# Patient Record
Sex: FEMALE | Race: WHITE | NOT HISPANIC OR LATINO
[De-identification: names, ages, dates, MRNs, and addresses within clinical notes are randomized per-mention and may not be internally consistent; named-entity substitution may affect disease eponyms.]

---

## 2016-12-07 RX ORDER — MORPHINE SULFATE 50 MG/1
1 CAPSULE, EXTENDED RELEASE ORAL
Qty: 0 | Refills: 0 | DISCHARGE
Start: 2016-12-07

## 2016-12-07 RX ORDER — GABAPENTIN 400 MG/1
1 CAPSULE ORAL
Qty: 0 | Refills: 0 | COMMUNITY
Start: 2016-12-07

## 2016-12-07 RX ORDER — DIAZEPAM 5 MG
1 TABLET ORAL
Qty: 0 | Refills: 0 | DISCHARGE
Start: 2016-12-07

## 2017-01-16 ENCOUNTER — APPOINTMENT (OUTPATIENT)
Dept: PULMONOLOGY | Facility: CLINIC | Age: 72
End: 2017-01-16

## 2017-01-16 VITALS
SYSTOLIC BLOOD PRESSURE: 160 MMHG | DIASTOLIC BLOOD PRESSURE: 70 MMHG | OXYGEN SATURATION: 98 % | WEIGHT: 127 LBS | BODY MASS INDEX: 22.5 KG/M2 | HEART RATE: 86 BPM

## 2017-01-16 RX ORDER — BUDESONIDE AND FORMOTEROL FUMARATE DIHYDRATE 80; 4.5 UG/1; UG/1
80-4.5 AEROSOL RESPIRATORY (INHALATION) TWICE DAILY
Qty: 1 | Refills: 2 | Status: ACTIVE | COMMUNITY
Start: 2017-01-16 | End: 1900-01-01

## 2017-05-23 ENCOUNTER — APPOINTMENT (OUTPATIENT)
Dept: NEUROSURGERY | Facility: CLINIC | Age: 72
End: 2017-05-23

## 2017-06-01 ENCOUNTER — APPOINTMENT (OUTPATIENT)
Dept: RADIOLOGY | Facility: HOSPITAL | Age: 72
End: 2017-06-01

## 2017-06-01 ENCOUNTER — OUTPATIENT (OUTPATIENT)
Dept: OUTPATIENT SERVICES | Facility: HOSPITAL | Age: 72
LOS: 1 days | End: 2017-06-01
Payer: MEDICARE

## 2017-06-01 DIAGNOSIS — Z98.89 OTHER SPECIFIED POSTPROCEDURAL STATES: Chronic | ICD-10-CM

## 2017-06-01 DIAGNOSIS — M54.9 DORSALGIA, UNSPECIFIED: Chronic | ICD-10-CM

## 2017-06-01 DIAGNOSIS — Z98.49 CATARACT EXTRACTION STATUS, UNSPECIFIED EYE: Chronic | ICD-10-CM

## 2017-06-01 DIAGNOSIS — Z98.1 ARTHRODESIS STATUS: Chronic | ICD-10-CM

## 2017-06-01 DIAGNOSIS — M48.06 SPINAL STENOSIS, LUMBAR REGION: ICD-10-CM

## 2017-06-01 PROCEDURE — 72132 CT LUMBAR SPINE W/DYE: CPT

## 2017-06-01 PROCEDURE — 62304 MYELOGRAPHY LUMBAR INJECTION: CPT

## 2017-07-03 ENCOUNTER — APPOINTMENT (OUTPATIENT)
Dept: NEUROSURGERY | Facility: CLINIC | Age: 72
End: 2017-07-03

## 2017-07-03 VITALS
HEIGHT: 63 IN | SYSTOLIC BLOOD PRESSURE: 130 MMHG | BODY MASS INDEX: 22.86 KG/M2 | DIASTOLIC BLOOD PRESSURE: 60 MMHG | HEART RATE: 83 BPM | WEIGHT: 129 LBS

## 2017-07-05 RX ORDER — RANITIDINE 300 MG/1
300 TABLET ORAL
Qty: 60 | Refills: 0 | Status: ACTIVE | COMMUNITY
Start: 2017-05-04

## 2017-07-18 ENCOUNTER — OUTPATIENT (OUTPATIENT)
Dept: OUTPATIENT SERVICES | Facility: HOSPITAL | Age: 72
LOS: 1 days | End: 2017-07-18
Payer: MEDICARE

## 2017-07-18 VITALS
HEIGHT: 64 IN | DIASTOLIC BLOOD PRESSURE: 78 MMHG | HEART RATE: 75 BPM | OXYGEN SATURATION: 98 % | TEMPERATURE: 98 F | WEIGHT: 126.99 LBS | SYSTOLIC BLOOD PRESSURE: 130 MMHG | RESPIRATION RATE: 15 BRPM

## 2017-07-18 DIAGNOSIS — E11.9 TYPE 2 DIABETES MELLITUS WITHOUT COMPLICATIONS: ICD-10-CM

## 2017-07-18 DIAGNOSIS — Z98.49 CATARACT EXTRACTION STATUS, UNSPECIFIED EYE: Chronic | ICD-10-CM

## 2017-07-18 DIAGNOSIS — Z98.89 OTHER SPECIFIED POSTPROCEDURAL STATES: Chronic | ICD-10-CM

## 2017-07-18 DIAGNOSIS — M54.9 DORSALGIA, UNSPECIFIED: Chronic | ICD-10-CM

## 2017-07-18 DIAGNOSIS — Z00.00 ENCOUNTER FOR GENERAL ADULT MEDICAL EXAMINATION WITHOUT ABNORMAL FINDINGS: ICD-10-CM

## 2017-07-18 DIAGNOSIS — Z01.818 ENCOUNTER FOR OTHER PREPROCEDURAL EXAMINATION: ICD-10-CM

## 2017-07-18 DIAGNOSIS — I10 ESSENTIAL (PRIMARY) HYPERTENSION: ICD-10-CM

## 2017-07-18 DIAGNOSIS — E03.9 HYPOTHYROIDISM, UNSPECIFIED: ICD-10-CM

## 2017-07-18 DIAGNOSIS — Z98.1 ARTHRODESIS STATUS: Chronic | ICD-10-CM

## 2017-07-18 DIAGNOSIS — G89.4 CHRONIC PAIN SYNDROME: ICD-10-CM

## 2017-07-18 LAB
ANION GAP SERPL CALC-SCNC: 16 MMOL/L — SIGNIFICANT CHANGE UP (ref 5–17)
BUN SERPL-MCNC: 25 MG/DL — HIGH (ref 7–23)
CALCIUM SERPL-MCNC: 9.9 MG/DL — SIGNIFICANT CHANGE UP (ref 8.4–10.5)
CHLORIDE SERPL-SCNC: 89 MMOL/L — LOW (ref 96–108)
CO2 SERPL-SCNC: 22 MMOL/L — SIGNIFICANT CHANGE UP (ref 22–31)
CREAT SERPL-MCNC: 0.98 MG/DL — SIGNIFICANT CHANGE UP (ref 0.5–1.3)
GLUCOSE SERPL-MCNC: 110 MG/DL — HIGH (ref 70–99)
HCT VFR BLD CALC: 31.7 % — LOW (ref 34.5–45)
HGB BLD-MCNC: 10.8 G/DL — LOW (ref 11.5–15.5)
MCHC RBC-ENTMCNC: 29.9 PG — SIGNIFICANT CHANGE UP (ref 27–34)
MCHC RBC-ENTMCNC: 34.1 GM/DL — SIGNIFICANT CHANGE UP (ref 32–36)
MCV RBC AUTO: 87.8 FL — SIGNIFICANT CHANGE UP (ref 80–100)
PLATELET # BLD AUTO: 347 K/UL — SIGNIFICANT CHANGE UP (ref 150–400)
POTASSIUM SERPL-MCNC: 4 MMOL/L — SIGNIFICANT CHANGE UP (ref 3.5–5.3)
POTASSIUM SERPL-SCNC: 4 MMOL/L — SIGNIFICANT CHANGE UP (ref 3.5–5.3)
RBC # BLD: 3.61 M/UL — LOW (ref 3.8–5.2)
RBC # FLD: 13.8 % — SIGNIFICANT CHANGE UP (ref 10.3–14.5)
SODIUM SERPL-SCNC: 127 MMOL/L — LOW (ref 135–145)
WBC # BLD: 10.17 K/UL — SIGNIFICANT CHANGE UP (ref 3.8–10.5)
WBC # FLD AUTO: 10.17 K/UL — SIGNIFICANT CHANGE UP (ref 3.8–10.5)

## 2017-07-18 PROCEDURE — 83036 HEMOGLOBIN GLYCOSYLATED A1C: CPT

## 2017-07-18 PROCEDURE — 85027 COMPLETE CBC AUTOMATED: CPT

## 2017-07-18 PROCEDURE — G0463: CPT

## 2017-07-18 PROCEDURE — 80048 BASIC METABOLIC PNL TOTAL CA: CPT

## 2017-07-18 NOTE — H&P PST ADULT - HISTORY OF PRESENT ILLNESS
72 y/o female with pmhx of DM type 2, HTN, asthma, GERD, chronic low back pain s/p multiple spinal surgeries s/p spinal cord stimulator placed in 2000 (Medtronic). Now needs to have battery change. Pt states she has been experiencing worsening left left pain - needs to have a pillow placed under her leg for comfort. Presents for spinal cord stimulator pulse generator replacement. 72 y/o female with pmhx of DM type 2, HTN, asthma, GERD, chronic low back pain s/p multiple spinal surgeries s/p spinal cord stimulator placed in 2000 (Medtronic). Now needs to have battery change. Pt states she has been experiencing worsening left leg pain - needs to have a pillow placed under her leg for comfort. Presents for spinal cord stimulator pulse generator replacement.

## 2017-07-18 NOTE — H&P PST ADULT - PMH
Anemia    Anxiety    Asthma  Allergy induced  Chronic pain disorder  1999 pain management DR Vargas  2004 /  [phone]  GERD (gastroesophageal reflux disease)    HTN (hypertension)    Hypothyroid    Migraine    Spinal stenosis    Type 2 diabetes mellitus Anemia    Anxiety    Asthma  Allergy induced  Chronic pain disorder  1999 pain management DR Vargas  2004 /  [phone]  GERD (gastroesophageal reflux disease)    HTN (hypertension)    Hypothyroid    Migraine    Spinal stenosis    Type 2 diabetes mellitus

## 2017-07-18 NOTE — H&P PST ADULT - ACTIVITY
able to take care of her home, grocery shopping, was able to walk from main entrance of the hospital to [facility] without stopping able to take care of her home, grocery shopping, was able to walk from main entrance of the hospital to [facility] without stopping; lives independently

## 2017-07-18 NOTE — H&P PST ADULT - ALLERGY TYPES
outdoor environmental allergies/indoor environmental allergies/reactions to medicines/reactions to animals

## 2017-07-18 NOTE — H&P PST ADULT - PSH
Back pain  Insertion Neurostimulator Metronic 2000   battery change   2002 , 2003, 11/06 off during day time  H/O arthroscopy of knee    S/P appendectomy  1956  S/P cervical discectomy  Anterior  with Fusion C3-C4, C4-C5, C5-C6, C6-C7 2006  S/P D&C (status post dilation and curettage)  x 2  & Cone biopsy 1996  S/P laminectomy with spinal fusion  Thorasic Posterior Arthrodesis  decompresion 11/06  S/P lumbar laminectomy  Decompressive Laminectomy L-3 -4  L4--5 2003   Lumbar  Arthrodesis  posterior instrumentation 4/04  S/P lumbar laminectomy  1995  S/P tonsillectomy  1948  Status post cataract extraction Back pain  Insertion Neurostimulator Metronic 2000   battery change   2002 , 2003, 11/06 off during day time  H/O arthroscopy of knee    S/P appendectomy  1956  S/P cervical discectomy  Anterior  with Fusion C3-C4, C4-C5, C5-C6, C6-C7 2006  S/P D&C (status post dilation and curettage)  x 2  & Cone biopsy 1996  S/P laminectomy with spinal fusion  Thorasic Posterior Arthrodesis  decompresion 11/06 2014 - Fusion of T8-T10 correction of scoliosis  2016 - L5-S1 stabilization reexploration of fusion  S/P lumbar laminectomy  Decompressive Laminectomy L-3 -4  L4--5 2003   Lumbar  Arthrodesis  posterior instrumentation 4/04  S/P lumbar laminectomy  1995  S/P tonsillectomy  1948  Status post cataract extraction

## 2017-07-18 NOTE — H&P PST ADULT - NSANTHOSAYNRD_GEN_A_CORE
No. ROMEO screening performed.  STOP BANG Legend: 0-2 = LOW Risk; 3-4 = INTERMEDIATE Risk; 5-8 = HIGH Risk/14.5 in

## 2017-07-19 LAB — HBA1C BLD-MCNC: 5.6 % — SIGNIFICANT CHANGE UP (ref 4–5.6)

## 2017-07-20 ENCOUNTER — APPOINTMENT (OUTPATIENT)
Dept: NEUROSURGERY | Facility: HOSPITAL | Age: 72
End: 2017-07-20
Payer: MEDICARE

## 2017-07-20 PROCEDURE — ZZZZZ: CPT

## 2017-08-14 ENCOUNTER — RESULT REVIEW (OUTPATIENT)
Age: 72
End: 2017-08-14

## 2017-08-18 ENCOUNTER — APPOINTMENT (OUTPATIENT)
Dept: PULMONOLOGY | Facility: CLINIC | Age: 72
End: 2017-08-18
Payer: MEDICARE

## 2017-08-18 VITALS
BODY MASS INDEX: 22.86 KG/M2 | SYSTOLIC BLOOD PRESSURE: 125 MMHG | OXYGEN SATURATION: 98 % | RESPIRATION RATE: 17 BRPM | WEIGHT: 129 LBS | HEART RATE: 96 BPM | DIASTOLIC BLOOD PRESSURE: 70 MMHG | HEIGHT: 63 IN

## 2017-08-18 DIAGNOSIS — R68.2 DRY MOUTH, UNSPECIFIED: ICD-10-CM

## 2017-08-18 PROCEDURE — 94620 PULMONARY STRESS TESTING SIMPLE: CPT

## 2017-08-18 PROCEDURE — 94727 GAS DIL/WSHOT DETER LNG VOL: CPT

## 2017-08-18 PROCEDURE — 94010 BREATHING CAPACITY TEST: CPT | Mod: 59

## 2017-08-18 PROCEDURE — 99214 OFFICE O/P EST MOD 30 MIN: CPT | Mod: 25

## 2017-08-18 PROCEDURE — 94729 DIFFUSING CAPACITY: CPT

## 2017-08-19 ENCOUNTER — TRANSCRIPTION ENCOUNTER (OUTPATIENT)
Age: 72
End: 2017-08-19

## 2017-08-23 ENCOUNTER — APPOINTMENT (OUTPATIENT)
Dept: THORACIC SURGERY | Facility: CLINIC | Age: 72
End: 2017-08-23
Payer: MEDICARE

## 2017-08-23 VITALS
SYSTOLIC BLOOD PRESSURE: 156 MMHG | DIASTOLIC BLOOD PRESSURE: 68 MMHG | WEIGHT: 124 LBS | OXYGEN SATURATION: 98 % | HEART RATE: 95 BPM | BODY MASS INDEX: 22.82 KG/M2 | HEIGHT: 62 IN

## 2017-08-23 DIAGNOSIS — G06.0 INTRACRANIAL ABSCESS AND GRANULOMA: ICD-10-CM

## 2017-08-23 DIAGNOSIS — Z78.9 OTHER SPECIFIED HEALTH STATUS: ICD-10-CM

## 2017-08-23 DIAGNOSIS — Z86.39 PERSONAL HISTORY OF OTHER ENDOCRINE, NUTRITIONAL AND METABOLIC DISEASE: ICD-10-CM

## 2017-08-23 DIAGNOSIS — Z87.01 PERSONAL HISTORY OF PNEUMONIA (RECURRENT): ICD-10-CM

## 2017-08-23 PROCEDURE — 99213 OFFICE O/P EST LOW 20 MIN: CPT

## 2017-08-23 RX ORDER — GABAPENTIN 100 MG/1
100 CAPSULE ORAL
Qty: 180 | Refills: 0 | Status: DISCONTINUED | COMMUNITY
Start: 2016-10-05 | End: 2017-08-23

## 2017-08-23 RX ORDER — BUTALBITAL, ACETAMINOPHEN AND CAFFEINE 300; 50; 40 MG/1; MG/1; MG/1
50-300-40 CAPSULE ORAL
Qty: 30 | Refills: 0 | Status: DISCONTINUED | COMMUNITY
Start: 2017-04-18 | End: 2017-08-23

## 2017-08-23 RX ORDER — GABAPENTIN 600 MG/1
600 TABLET, COATED ORAL
Qty: 90 | Refills: 0 | Status: DISCONTINUED | COMMUNITY
Start: 2017-05-16 | End: 2017-08-23

## 2017-08-23 RX ORDER — DIAZEPAM 5 MG/1
5 TABLET ORAL
Qty: 90 | Refills: 0 | Status: DISCONTINUED | COMMUNITY
Start: 2016-12-15 | End: 2017-08-23

## 2017-08-23 RX ORDER — CYCLOBENZAPRINE HYDROCHLORIDE 5 MG/1
5 TABLET, FILM COATED ORAL
Qty: 30 | Refills: 0 | Status: DISCONTINUED | COMMUNITY
Start: 2017-08-01 | End: 2017-08-23

## 2017-08-23 RX ORDER — HYDROCHLOROTHIAZIDE 12.5 MG/1
12.5 TABLET ORAL
Qty: 90 | Refills: 0 | Status: DISCONTINUED | COMMUNITY
Start: 2017-02-01 | End: 2017-08-23

## 2017-08-23 RX ORDER — METFORMIN ER 500 MG 500 MG/1
500 TABLET ORAL
Qty: 30 | Refills: 0 | Status: DISCONTINUED | COMMUNITY
Start: 2017-04-12 | End: 2017-08-23

## 2017-08-23 RX ORDER — FOLIC ACID 1 MG/1
1 TABLET ORAL
Qty: 30 | Refills: 0 | Status: DISCONTINUED | COMMUNITY
Start: 2016-12-15 | End: 2017-08-23

## 2017-08-23 RX ORDER — METHADONE HYDROCHLORIDE 10 MG/1
10 TABLET ORAL
Qty: 90 | Refills: 0 | Status: DISCONTINUED | COMMUNITY
Start: 2017-04-18 | End: 2017-08-23

## 2017-08-23 RX ORDER — LOSARTAN POTASSIUM 25 MG/1
25 TABLET, FILM COATED ORAL
Qty: 30 | Refills: 0 | Status: DISCONTINUED | COMMUNITY
Start: 2017-01-04 | End: 2017-08-23

## 2017-08-23 RX ORDER — LOSARTAN POTASSIUM 50 MG/1
50 TABLET, FILM COATED ORAL
Qty: 6 | Refills: 0 | Status: DISCONTINUED | COMMUNITY
Start: 2017-01-30 | End: 2017-08-23

## 2017-08-23 RX ORDER — GABAPENTIN 400 MG/1
400 CAPSULE ORAL
Qty: 90 | Refills: 0 | Status: DISCONTINUED | COMMUNITY
Start: 2017-04-18 | End: 2017-08-23

## 2017-08-23 RX ORDER — AMOXICILLIN 500 MG/1
500 CAPSULE ORAL
Qty: 40 | Refills: 0 | Status: DISCONTINUED | COMMUNITY
Start: 2017-06-23 | End: 2017-08-23

## 2017-09-27 ENCOUNTER — OUTPATIENT (OUTPATIENT)
Dept: OUTPATIENT SERVICES | Facility: HOSPITAL | Age: 72
LOS: 1 days | End: 2017-09-27
Payer: MEDICARE

## 2017-09-27 VITALS
OXYGEN SATURATION: 98 % | WEIGHT: 268.96 LBS | SYSTOLIC BLOOD PRESSURE: 140 MMHG | HEIGHT: 62 IN | RESPIRATION RATE: 16 BRPM | DIASTOLIC BLOOD PRESSURE: 70 MMHG | TEMPERATURE: 98 F | HEART RATE: 78 BPM

## 2017-09-27 DIAGNOSIS — M48.00 SPINAL STENOSIS, SITE UNSPECIFIED: ICD-10-CM

## 2017-09-27 DIAGNOSIS — I10 ESSENTIAL (PRIMARY) HYPERTENSION: ICD-10-CM

## 2017-09-27 DIAGNOSIS — Z98.89 OTHER SPECIFIED POSTPROCEDURAL STATES: Chronic | ICD-10-CM

## 2017-09-27 DIAGNOSIS — T85.192D OTHER MECHANICAL COMPLICATION OF IMPLANTED ELECTRONIC NEUROSTIMULATOR OF SPINAL CORD ELECTRODE (LEAD), SUBSEQUENT ENCOUNTER: ICD-10-CM

## 2017-09-27 DIAGNOSIS — Z98.49 CATARACT EXTRACTION STATUS, UNSPECIFIED EYE: Chronic | ICD-10-CM

## 2017-09-27 DIAGNOSIS — E11.9 TYPE 2 DIABETES MELLITUS WITHOUT COMPLICATIONS: ICD-10-CM

## 2017-09-27 DIAGNOSIS — Z98.1 ARTHRODESIS STATUS: Chronic | ICD-10-CM

## 2017-09-27 DIAGNOSIS — M54.9 DORSALGIA, UNSPECIFIED: Chronic | ICD-10-CM

## 2017-09-27 DIAGNOSIS — E03.9 HYPOTHYROIDISM, UNSPECIFIED: ICD-10-CM

## 2017-09-27 LAB
BUN SERPL-MCNC: 20 MG/DL — SIGNIFICANT CHANGE UP (ref 7–23)
CALCIUM SERPL-MCNC: 9.2 MG/DL — SIGNIFICANT CHANGE UP (ref 8.4–10.5)
CHLORIDE SERPL-SCNC: 100 MMOL/L — SIGNIFICANT CHANGE UP (ref 98–107)
CO2 SERPL-SCNC: 24 MMOL/L — SIGNIFICANT CHANGE UP (ref 22–31)
CREAT SERPL-MCNC: 0.91 MG/DL — SIGNIFICANT CHANGE UP (ref 0.5–1.3)
GLUCOSE SERPL-MCNC: 102 MG/DL — HIGH (ref 70–99)
HBA1C BLD-MCNC: 5.7 % — HIGH (ref 4–5.6)
HCT VFR BLD CALC: 35.5 % — SIGNIFICANT CHANGE UP (ref 34.5–45)
HGB BLD-MCNC: 11.6 G/DL — SIGNIFICANT CHANGE UP (ref 11.5–15.5)
MCHC RBC-ENTMCNC: 30.8 PG — SIGNIFICANT CHANGE UP (ref 27–34)
MCHC RBC-ENTMCNC: 32.7 % — SIGNIFICANT CHANGE UP (ref 32–36)
MCV RBC AUTO: 94.2 FL — SIGNIFICANT CHANGE UP (ref 80–100)
NRBC # FLD: 0 — SIGNIFICANT CHANGE UP
PLATELET # BLD AUTO: 260 K/UL — SIGNIFICANT CHANGE UP (ref 150–400)
PMV BLD: 10.2 FL — SIGNIFICANT CHANGE UP (ref 7–13)
POTASSIUM SERPL-MCNC: 4.3 MMOL/L — SIGNIFICANT CHANGE UP (ref 3.5–5.3)
POTASSIUM SERPL-SCNC: 4.3 MMOL/L — SIGNIFICANT CHANGE UP (ref 3.5–5.3)
RBC # BLD: 3.77 M/UL — LOW (ref 3.8–5.2)
RBC # FLD: 13.8 % — SIGNIFICANT CHANGE UP (ref 10.3–14.5)
SODIUM SERPL-SCNC: 135 MMOL/L — SIGNIFICANT CHANGE UP (ref 135–145)
WBC # BLD: 8.57 K/UL — SIGNIFICANT CHANGE UP (ref 3.8–10.5)
WBC # FLD AUTO: 8.57 K/UL — SIGNIFICANT CHANGE UP (ref 3.8–10.5)

## 2017-09-27 PROCEDURE — 93010 ELECTROCARDIOGRAM REPORT: CPT

## 2017-09-27 RX ORDER — AZELASTINE 137 UG/1
1 SPRAY, METERED NASAL
Qty: 0 | Refills: 0 | COMMUNITY

## 2017-09-27 NOTE — H&P PST ADULT - MUSCULOSKELETAL
details… detailed exam cervical spine, lumbar spine, left leg/decreased ROM due to pain/diminished strength

## 2017-09-27 NOTE — H&P PST ADULT - HISTORY OF PRESENT ILLNESS
71 y/o female with chronic back pain and scoliosis s/p multiple laminectomies with worsening low back pain and left leg pain over the last several months. Minimal relief from narcotic analgesics. Presents for removal of spinal hardware, exploration of spinal fusion, left L5-S1 foraminotomy, L2-S1 stabilization and fusion. 70 y/o female with chronic back pain and scoliosis s/p multiple laminectomies scheduled for Spinal Cord stimulator Pulse generator replacement with Dr Pak 10/4/17. Pt s/p L5-S1 lumbar fusion 11/16 - but continues to have chronic pain and under pain management. Pt states pain is 3/10 with meds. Generator placed 2000 - last change 8 yrs ago. Pt hx of DM, HTN HLD

## 2017-09-27 NOTE — H&P PST ADULT - PMH
Anemia    Anxiety    Asthma  Allergy induced  Chronic pain disorder  1999 pain management DR Vargas  2004 /  [phone]  GERD (gastroesophageal reflux disease)    HTN (hypertension)    Hypothyroid    Migraine    Spinal stenosis    Type 2 diabetes mellitus

## 2017-09-27 NOTE — H&P PST ADULT - PROBLEM SELECTOR PLAN 1
Pt given pre-op instructions and Famotidine and Chlorhexidine and verbalized understanding.   OR Booking notified of Medtronic nerve stimulator, allergy to Dilaudid and restriction to ROM of cervical spine via fax.   MC requested and forms given.

## 2017-09-27 NOTE — H&P PST ADULT - MALLAMPATI CLASS
Class I (easy) - visualization of the soft palate, fauces, uvula, and both anterior and posterior pillars with phonation/Class I (easy) - visualization of the soft palate, fauces, uvula, and both anterior and posterior pillars

## 2017-09-27 NOTE — H&P PST ADULT - NEGATIVE ENMT SYMPTOMS
no throat pain/no dysphagia/no ear pain/no vertigo/no sinus symptoms/no tinnitus/no hearing difficulty

## 2017-09-27 NOTE — H&P PST ADULT - NEGATIVE NEUROLOGICAL SYMPTOMS
no difficulty walking/no transient paralysis/no confusion/no weakness/no generalized seizures/no focal seizures/no tremors/no syncope

## 2017-09-27 NOTE — H&P PST ADULT - PSH
Back pain  Insertion Neurostimulator Metronic 2000   battery change   2002 , 2003, 11/06 off during day time  H/O arthroscopy of knee    S/P appendectomy  1956  S/P cervical discectomy  Anterior  with Fusion C3-C4, C4-C5, C5-C6, C6-C7 2006  S/P D&C (status post dilation and curettage)  x 2  & Cone biopsy 1996  S/P laminectomy with spinal fusion  Thorasic Posterior Arthrodesis  decompresion 11/06  S/P lumbar laminectomy  Decompressive Laminectomy L-3 -4  L4--5 2003   Lumbar  Arthrodesis  posterior instrumentation 4/04  S/P lumbar laminectomy  1995  S/P tonsillectomy  1948  Status post cataract extraction

## 2017-09-27 NOTE — H&P PST ADULT - NEUROLOGICAL DETAILS
sensation intact/alert and oriented x 3/responds to pain/normal strength/responds to verbal commands

## 2017-10-10 NOTE — ASU PATIENT PROFILE, ADULT - PMH
Anemia    Anxiety    Asthma  Allergy induced  Chronic pain disorder  1999 pain management DR Vargas  2004 /  [phone]  GERD (gastroesophageal reflux disease)    HTN (hypertension)    Hypothyroid    Migraine    Spinal stenosis    Type 2 diabetes mellitus

## 2017-10-11 ENCOUNTER — OUTPATIENT (OUTPATIENT)
Dept: INPATIENT UNIT | Facility: HOSPITAL | Age: 72
LOS: 1 days | Discharge: HOME | End: 2017-10-11

## 2017-10-11 VITALS
DIASTOLIC BLOOD PRESSURE: 60 MMHG | HEIGHT: 62 IN | WEIGHT: 268.96 LBS | OXYGEN SATURATION: 100 % | SYSTOLIC BLOOD PRESSURE: 152 MMHG | HEART RATE: 84 BPM | RESPIRATION RATE: 16 BRPM | TEMPERATURE: 98 F

## 2017-10-11 VITALS
SYSTOLIC BLOOD PRESSURE: 149 MMHG | RESPIRATION RATE: 16 BRPM | OXYGEN SATURATION: 99 % | TEMPERATURE: 97 F | DIASTOLIC BLOOD PRESSURE: 64 MMHG | HEART RATE: 76 BPM

## 2017-10-11 DIAGNOSIS — Z98.89 OTHER SPECIFIED POSTPROCEDURAL STATES: Chronic | ICD-10-CM

## 2017-10-11 DIAGNOSIS — Z98.49 CATARACT EXTRACTION STATUS, UNSPECIFIED EYE: Chronic | ICD-10-CM

## 2017-10-11 DIAGNOSIS — Z98.1 ARTHRODESIS STATUS: Chronic | ICD-10-CM

## 2017-10-11 DIAGNOSIS — T85.192D OTHER MECHANICAL COMPLICATION OF IMPLANTED ELECTRONIC NEUROSTIMULATOR OF SPINAL CORD ELECTRODE (LEAD), SUBSEQUENT ENCOUNTER: ICD-10-CM

## 2017-10-11 DIAGNOSIS — M54.9 DORSALGIA, UNSPECIFIED: Chronic | ICD-10-CM

## 2017-10-11 RX ORDER — LOSARTAN POTASSIUM 100 MG/1
100 TABLET, FILM COATED ORAL DAILY
Qty: 0 | Refills: 0 | Status: DISCONTINUED | OUTPATIENT
Start: 2017-10-11 | End: 2017-10-11

## 2017-10-11 RX ORDER — CEPHALEXIN 500 MG
500 CAPSULE ORAL EVERY 12 HOURS
Qty: 0 | Refills: 0 | Status: DISCONTINUED | OUTPATIENT
Start: 2017-10-11 | End: 2017-10-11

## 2017-10-11 RX ORDER — MONTELUKAST 4 MG/1
10 TABLET, CHEWABLE ORAL DAILY
Qty: 0 | Refills: 0 | Status: DISCONTINUED | OUTPATIENT
Start: 2017-10-11 | End: 2017-10-11

## 2017-10-11 RX ORDER — CEPHALEXIN 500 MG
1 CAPSULE ORAL
Qty: 0 | Refills: 0 | DISCHARGE
Start: 2017-10-11

## 2017-10-11 RX ORDER — CEPHALEXIN 500 MG
1 CAPSULE ORAL
Qty: 10 | Refills: 0 | OUTPATIENT
Start: 2017-10-11 | End: 2017-10-16

## 2017-10-11 RX ORDER — FENTANYL CITRATE 50 UG/ML
50 INJECTION INTRAVENOUS
Qty: 0 | Refills: 0 | Status: DISCONTINUED | OUTPATIENT
Start: 2017-10-11 | End: 2017-10-11

## 2017-10-11 RX ORDER — MORPHINE SULFATE 50 MG/1
30 CAPSULE, EXTENDED RELEASE ORAL
Qty: 0 | Refills: 0 | Status: DISCONTINUED | OUTPATIENT
Start: 2017-10-11 | End: 2017-10-11

## 2017-10-11 RX ORDER — FLUTICASONE PROPIONATE 50 MCG
1 SPRAY, SUSPENSION NASAL
Qty: 0 | Refills: 0 | Status: DISCONTINUED | OUTPATIENT
Start: 2017-10-11 | End: 2017-10-11

## 2017-10-11 RX ORDER — CYCLOBENZAPRINE HYDROCHLORIDE 10 MG/1
10 TABLET, FILM COATED ORAL THREE TIMES A DAY
Qty: 0 | Refills: 0 | Status: DISCONTINUED | OUTPATIENT
Start: 2017-10-11 | End: 2017-10-11

## 2017-10-11 RX ORDER — ONDANSETRON 8 MG/1
4 TABLET, FILM COATED ORAL EVERY 6 HOURS
Qty: 0 | Refills: 0 | Status: DISCONTINUED | OUTPATIENT
Start: 2017-10-11 | End: 2017-10-11

## 2017-10-11 RX ORDER — LEVOTHYROXINE SODIUM 125 MCG
88 TABLET ORAL DAILY
Qty: 0 | Refills: 0 | Status: DISCONTINUED | OUTPATIENT
Start: 2017-10-11 | End: 2017-10-11

## 2017-10-11 RX ORDER — ONDANSETRON 8 MG/1
4 TABLET, FILM COATED ORAL ONCE
Qty: 0 | Refills: 0 | Status: DISCONTINUED | OUTPATIENT
Start: 2017-10-11 | End: 2017-10-11

## 2017-10-11 RX ORDER — ACETAMINOPHEN 500 MG
2 TABLET ORAL
Qty: 0 | Refills: 0 | DISCHARGE
Start: 2017-10-11

## 2017-10-11 RX ORDER — AMLODIPINE BESYLATE 2.5 MG/1
10 TABLET ORAL DAILY
Qty: 0 | Refills: 0 | Status: DISCONTINUED | OUTPATIENT
Start: 2017-10-11 | End: 2017-10-11

## 2017-10-11 RX ORDER — MORPHINE SULFATE 50 MG/1
60 CAPSULE, EXTENDED RELEASE ORAL AT BEDTIME
Qty: 0 | Refills: 0 | Status: DISCONTINUED | OUTPATIENT
Start: 2017-10-11 | End: 2017-10-11

## 2017-10-11 RX ORDER — CEPHALEXIN 500 MG
1 CAPSULE ORAL
Qty: 10 | Refills: 0
Start: 2017-10-11 | End: 2017-10-16

## 2017-10-11 RX ORDER — MORPHINE SULFATE 50 MG/1
2 CAPSULE, EXTENDED RELEASE ORAL
Qty: 0 | Refills: 0 | Status: DISCONTINUED | OUTPATIENT
Start: 2017-10-11 | End: 2017-10-11

## 2017-10-11 RX ORDER — FENTANYL CITRATE 50 UG/ML
25 INJECTION INTRAVENOUS
Qty: 0 | Refills: 0 | Status: DISCONTINUED | OUTPATIENT
Start: 2017-10-11 | End: 2017-10-11

## 2017-10-11 RX ORDER — ACETAMINOPHEN 500 MG
650 TABLET ORAL EVERY 6 HOURS
Qty: 0 | Refills: 0 | Status: DISCONTINUED | OUTPATIENT
Start: 2017-10-11 | End: 2017-10-11

## 2017-10-11 RX ORDER — TOPIRAMATE 25 MG
200 TABLET ORAL DAILY
Qty: 0 | Refills: 0 | Status: DISCONTINUED | OUTPATIENT
Start: 2017-10-11 | End: 2017-10-11

## 2017-10-11 RX ORDER — LORATADINE 10 MG/1
1 TABLET ORAL
Qty: 0 | Refills: 0 | DISCHARGE
Start: 2017-10-11

## 2017-10-11 RX ORDER — LORATADINE 10 MG/1
10 TABLET ORAL DAILY
Qty: 0 | Refills: 0 | Status: DISCONTINUED | OUTPATIENT
Start: 2017-10-11 | End: 2017-10-11

## 2017-10-11 RX ORDER — DEXTROSE MONOHYDRATE, SODIUM CHLORIDE, AND POTASSIUM CHLORIDE 50; .745; 4.5 G/1000ML; G/1000ML; G/1000ML
1000 INJECTION, SOLUTION INTRAVENOUS
Qty: 0 | Refills: 0 | Status: DISCONTINUED | OUTPATIENT
Start: 2017-10-11 | End: 2017-10-11

## 2017-10-11 RX ORDER — SODIUM CHLORIDE 9 MG/ML
1000 INJECTION, SOLUTION INTRAVENOUS
Qty: 0 | Refills: 0 | Status: DISCONTINUED | OUTPATIENT
Start: 2017-10-11 | End: 2017-10-11

## 2017-10-11 NOTE — BRIEF OPERATIVE NOTE - PROCEDURE
<<-----Click on this checkbox to enter Procedure Spinal cord stimulator replacement  10/11/2017    Active  LETITIA

## 2017-10-11 NOTE — ASU DISCHARGE PLAN (ADULT/PEDIATRIC). - MEDICATION SUMMARY - MEDICATIONS TO TAKE
I will START or STAY ON the medications listed below when I get home from the hospital:    morphine 20mg/ML solution  -- 1 milliliter(s) by mouth every 4 hours, As Needed  -- Indication: For pain    morphine 30 mg oral tablet  -- 1 tab(s) by mouth 2 times a day  -- Indication: For pain    morphine 60 mg/24 hours oral capsule, extended release  -- 1 cap(s) by mouth once a day (at bedtime)  -- Indication: For pain    Fioricet oral tablet  -- 1 tab(s) by mouth every 4 hours, As Needed  -- Indication: For pain    acetaminophen 325 mg oral tablet  -- 2 tab(s) by mouth every 6 hours, As needed, For Temp greater than 38 C (100.4 F)  -- Indication: For pain    acetaminophen 325 mg oral tablet  -- 2 tab(s) by mouth every 6 hours, As needed, Mild Pain (1 - 3)  -- Indication: For pain    losartan 100 mg oral tablet  -- 1 tab(s) by mouth once a day  -- Indication: For hypertension    diazePAM 10 mg oral tablet  -- 1 tab(s) by mouth once a day, As Needed -muscle spasm  -- Indication: For anxiety/muscle spasm    Topamax 100 mg oral tablet  -- 2 tab(s) by mouth once a day  -- Indication: For pain    Januvia 100 mg oral tablet  -- 1 tab(s) by mouth once a day  -- Indication: For diabetes    Clarinex 5 mg oral tablet  -- 1 tab(s) by mouth once a day  -- Indication: For asthma    loratadine 10 mg oral tablet  -- 1 tab(s) by mouth once a day  -- Indication: For cough/wheez    amLODIPine 10 mg oral tablet  -- 1 tab(s) by mouth once a day  -- Indication: For hypertension    Keflex 500 mg oral capsule  -- 1 cap(s) by mouth every 12 hours MDD:2  -- Finish all this medication unless otherwise directed by prescriber.    -- Indication: For postop    cephalexin 500 mg oral capsule  -- 1 cap(s) by mouth every 12 hours  -- Indication: For postop    Lidoderm 5% topical film  -- Apply on skin to affected area 2 times a day, As Needed  -- Indication: For pain    raNITIdine 300 mg oral tablet  -- 1 tab(s) by mouth once a day  -- Indication: For GERD    montelukast 10 mg oral tablet  -- 1 tab(s) by mouth once a day  -- Indication: For asthma    calcium citrate 200 mg oral tablet  -- 2 tab(s) by mouth once a day (at bedtime)  -- Indication: For nutrition    cyclobenzaprine 10 mg oral tablet  -- 1 tab(s) by mouth 3 times a day, As needed, Muscle Spasm  -- Indication: For muslce spas m    Skelaxin 800 mg oral tablet  -- 1 tab(s) by mouth 4 times a day, As Needed  -- Indication: For muscle spasm    fluticasone 50 mcg/inh nasal spray  -- 1 spray(s) into nose 2 times a day  -- Indication: For asthma    azelastine 137 mcg/inh (0.1%) nasal spray  -- 1 spray(s) into nose 2 times a day  -- Indication: For asthma    levothyroxine 88 mcg (0.088 mg) oral tablet  -- 1 tab(s) by mouth once a day  -- Indication: For hypothyroidism    biotin 5000 mcg oral tablet, disintegrating  -- 2 tab(s) by mouth once a day  -- Indication: For nutrition    vitamin E 400 intl units oral capsule  -- 1 cap(s) by mouth once a day  -- Indication: For nutrition    Vitamin D3 2000 intl units oral tablet  -- 1 tab(s) by mouth once a day  -- Indication: For nutrition

## 2017-10-11 NOTE — ASU DISCHARGE PLAN (ADULT/PEDIATRIC). - NOTIFY
Fever greater than 101/Inability to Tolerate Liquids or Foods/Persistent Nausea and Vomiting/Unable to Urinate/Numbness, color, or temperature change to extremity/Pain not relieved by Medications/Bleeding that does not stop/Numbness, tingling/Excessive Diarrhea/Swelling that continues/Increased Irritability or Sluggishness

## 2017-10-11 NOTE — ASU DISCHARGE PLAN (ADULT/PEDIATRIC). - SPECIAL INSTRUCTIONS
take dressing off tomorrow; ok to shower after dressing is off; incision can be pat-dried w/ clean towel

## 2017-10-11 NOTE — ASU DISCHARGE PLAN (ADULT/PEDIATRIC). - CONDITIONS AT DISCHARGE
stable; pain well controlled, vital signs stable stable; pain well controlled, vital signs stable  Patient is stable and meets discharge criteria. Patient made aware that he/she must wait on unit to be escorted by ASU RN or ASU PCA to awaiting car in front of the main building after being discharged by Anesthesia Department.

## 2017-10-12 ENCOUNTER — TRANSCRIPTION ENCOUNTER (OUTPATIENT)
Age: 72
End: 2017-10-12

## 2017-11-06 ENCOUNTER — APPOINTMENT (OUTPATIENT)
Dept: PULMONOLOGY | Facility: CLINIC | Age: 72
End: 2017-11-06
Payer: MEDICARE

## 2017-11-06 VITALS
HEIGHT: 62 IN | WEIGHT: 123 LBS | SYSTOLIC BLOOD PRESSURE: 110 MMHG | OXYGEN SATURATION: 100 % | BODY MASS INDEX: 22.63 KG/M2 | HEART RATE: 98 BPM | RESPIRATION RATE: 17 BRPM | DIASTOLIC BLOOD PRESSURE: 80 MMHG

## 2017-11-06 DIAGNOSIS — H61.20 IMPACTED CERUMEN, UNSPECIFIED EAR: ICD-10-CM

## 2017-11-06 DIAGNOSIS — Z86.69 PERSONAL HISTORY OF OTHER DISEASES OF THE NERVOUS SYSTEM AND SENSE ORGANS: ICD-10-CM

## 2017-11-06 DIAGNOSIS — H90.5 UNSPECIFIED SENSORINEURAL HEARING LOSS: ICD-10-CM

## 2017-11-06 PROCEDURE — 99214 OFFICE O/P EST MOD 30 MIN: CPT | Mod: 25

## 2017-11-06 PROCEDURE — 94620 PULMONARY STRESS TESTING SIMPLE: CPT

## 2017-11-06 RX ORDER — CEPHALEXIN 500 MG/1
500 CAPSULE ORAL
Qty: 10 | Refills: 0 | Status: DISCONTINUED | COMMUNITY
Start: 2017-10-11

## 2017-11-07 ENCOUNTER — APPOINTMENT (OUTPATIENT)
Dept: NEUROSURGERY | Facility: CLINIC | Age: 72
End: 2017-11-07
Payer: MEDICARE

## 2017-11-07 VITALS — BODY MASS INDEX: 22.63 KG/M2 | HEIGHT: 62 IN | WEIGHT: 123 LBS

## 2017-11-07 DIAGNOSIS — Z46.2 ENCOUNTER FOR FITTING AND ADJUSTMENT OF OTHER DEVICES RELATED TO NERVOUS SYSTEM AND SPECIAL SENSES: ICD-10-CM

## 2017-11-07 PROCEDURE — 99024 POSTOP FOLLOW-UP VISIT: CPT

## 2017-11-08 PROBLEM — Z46.2 BATTERY END OF LIFE OF SPINAL CORD STIMULATOR: Status: ACTIVE | Noted: 2017-07-05

## 2018-01-18 ENCOUNTER — APPOINTMENT (OUTPATIENT)
Dept: PULMONOLOGY | Facility: CLINIC | Age: 73
End: 2018-01-18
Payer: MEDICARE

## 2018-01-18 VITALS
HEIGHT: 62 IN | RESPIRATION RATE: 17 BRPM | SYSTOLIC BLOOD PRESSURE: 120 MMHG | WEIGHT: 122 LBS | OXYGEN SATURATION: 99 % | HEART RATE: 114 BPM | BODY MASS INDEX: 22.45 KG/M2 | DIASTOLIC BLOOD PRESSURE: 60 MMHG

## 2018-01-18 PROCEDURE — 94010 BREATHING CAPACITY TEST: CPT

## 2018-01-18 PROCEDURE — 99214 OFFICE O/P EST MOD 30 MIN: CPT | Mod: 25

## 2018-04-18 ENCOUNTER — APPOINTMENT (OUTPATIENT)
Dept: THORACIC SURGERY | Facility: CLINIC | Age: 73
End: 2018-04-18
Payer: MEDICARE

## 2018-04-18 VITALS
HEIGHT: 62 IN | BODY MASS INDEX: 22.26 KG/M2 | RESPIRATION RATE: 16 BRPM | OXYGEN SATURATION: 98 % | SYSTOLIC BLOOD PRESSURE: 120 MMHG | HEART RATE: 88 BPM | WEIGHT: 121 LBS | DIASTOLIC BLOOD PRESSURE: 64 MMHG

## 2018-04-18 PROCEDURE — 99213 OFFICE O/P EST LOW 20 MIN: CPT

## 2018-05-14 ENCOUNTER — OUTPATIENT (OUTPATIENT)
Dept: OUTPATIENT SERVICES | Facility: HOSPITAL | Age: 73
LOS: 1 days | End: 2018-05-14
Payer: MEDICARE

## 2018-05-14 DIAGNOSIS — Z98.89 OTHER SPECIFIED POSTPROCEDURAL STATES: Chronic | ICD-10-CM

## 2018-05-14 DIAGNOSIS — Z98.1 ARTHRODESIS STATUS: Chronic | ICD-10-CM

## 2018-05-14 DIAGNOSIS — M46.1 SACROILIITIS, NOT ELSEWHERE CLASSIFIED: ICD-10-CM

## 2018-05-14 DIAGNOSIS — M54.9 DORSALGIA, UNSPECIFIED: Chronic | ICD-10-CM

## 2018-05-14 DIAGNOSIS — Z98.49 CATARACT EXTRACTION STATUS, UNSPECIFIED EYE: Chronic | ICD-10-CM

## 2018-05-14 PROCEDURE — 77003 FLUOROGUIDE FOR SPINE INJECT: CPT

## 2018-05-14 PROCEDURE — G0260: CPT | Mod: 50

## 2018-05-23 ENCOUNTER — APPOINTMENT (OUTPATIENT)
Dept: PULMONOLOGY | Facility: CLINIC | Age: 73
End: 2018-05-23
Payer: MEDICARE

## 2018-05-23 ENCOUNTER — NON-APPOINTMENT (OUTPATIENT)
Age: 73
End: 2018-05-23

## 2018-05-23 VITALS
HEIGHT: 62 IN | BODY MASS INDEX: 22.08 KG/M2 | OXYGEN SATURATION: 98 % | SYSTOLIC BLOOD PRESSURE: 130 MMHG | DIASTOLIC BLOOD PRESSURE: 70 MMHG | WEIGHT: 120 LBS | HEART RATE: 82 BPM

## 2018-05-23 PROCEDURE — 99214 OFFICE O/P EST MOD 30 MIN: CPT | Mod: 25

## 2018-05-23 PROCEDURE — 94010 BREATHING CAPACITY TEST: CPT

## 2018-08-20 ENCOUNTER — APPOINTMENT (OUTPATIENT)
Dept: PULMONOLOGY | Facility: CLINIC | Age: 73
End: 2018-08-20
Payer: MEDICARE

## 2018-08-20 VITALS
TEMPERATURE: 97.8 F | HEART RATE: 74 BPM | RESPIRATION RATE: 16 BRPM | SYSTOLIC BLOOD PRESSURE: 134 MMHG | HEIGHT: 62 IN | OXYGEN SATURATION: 95 % | BODY MASS INDEX: 22.63 KG/M2 | WEIGHT: 123 LBS | DIASTOLIC BLOOD PRESSURE: 72 MMHG

## 2018-08-20 DIAGNOSIS — J34.2 DEVIATED NASAL SEPTUM: ICD-10-CM

## 2018-08-20 PROCEDURE — 99070 SPECIAL SUPPLIES PHYS/QHP: CPT

## 2018-08-20 PROCEDURE — 99214 OFFICE O/P EST MOD 30 MIN: CPT

## 2018-10-15 ENCOUNTER — APPOINTMENT (OUTPATIENT)
Dept: PULMONOLOGY | Facility: CLINIC | Age: 73
End: 2018-10-15
Payer: MEDICARE

## 2018-10-15 ENCOUNTER — NON-APPOINTMENT (OUTPATIENT)
Age: 73
End: 2018-10-15

## 2018-10-15 VITALS
DIASTOLIC BLOOD PRESSURE: 70 MMHG | SYSTOLIC BLOOD PRESSURE: 120 MMHG | WEIGHT: 123 LBS | BODY MASS INDEX: 22.63 KG/M2 | OXYGEN SATURATION: 96 % | HEART RATE: 76 BPM | HEIGHT: 62 IN

## 2018-10-15 PROCEDURE — 99214 OFFICE O/P EST MOD 30 MIN: CPT | Mod: 25

## 2018-10-15 PROCEDURE — 95012 NITRIC OXIDE EXP GAS DETER: CPT

## 2018-10-15 PROCEDURE — 94010 BREATHING CAPACITY TEST: CPT

## 2018-10-15 NOTE — PHYSICAL EXAM

## 2018-10-15 NOTE — PROCEDURE
[FreeTextEntry1] : She had a CT chest scan performed on 4/10/2018, which showed marked interval improvement in groundglass opacities in the right upper lobe and left lower lobe. Assisted bronchiectasis/bronchiolectasis and mile volume loss. Changes are compatible with an improving inflammatory process. Mild mediastinal ruy prominence without significant interval change. Minimal emphysema lung changes (stable). \par \par PFT- spi reveals normal flows; FEV1 was 2.60 L which is 127% of predicted; normal flow volume loop

## 2018-10-15 NOTE — HISTORY OF PRESENT ILLNESS
[FreeTextEntry1] : Ms. Kenney is a 72 year old female presenting to the office for a follow up visit for abnormal chest CT, reflux, allergic rhinitis, asthma, bronchiectasis, chronic rhinitis, dry mouth, lung nodules and shortness of breath. Her chief complaint is \par - She comes in stating that she generally feels okay. \par - She notes having brought up a small mucus plug. \par - She states that she is sleeping generally well aside from her leg pain, which she is able to overcome by finding the right position. She sleeps 4-5 hours uninterrupted. She states that it takes a while to find the right spot. She states that her pain is focused in the hip and attributes to pain to nerves. \par - Her weight has been stable. \par - She is exercising regularly: a lot of stretching, pelvic lifts, leg lifts, ambulation. She states that she stretches all day. \par - Her bowels are much more improved.\par - Her balance has  also improved as she was able to work in her garden. She notes heightened confidence. \par He / She denies any headaches, nausea, vomiting, fever, chills, sweats, chest pain, chest pressure, palpitations, SOB, coughing, wheezing, fatigue, diarrhea, constipation, dysphagia, dizziness, leg swelling, leg pain, itchy eyes, itchy ears, heartburn, reflux, or sour taste in the mouth.

## 2018-10-15 NOTE — REASON FOR VISIT
[Follow-Up] : a follow-up visit [FreeTextEntry1] : abnormal chest CT, reflux, allergic rhinitis, asthma, bronchiectasis, chronic rhinitis, dry mouth, lung nodules and shortness of breath

## 2018-10-15 NOTE — ADDENDUM
[FreeTextEntry1] : Documented by Sebastian Trejo acting as a scribe for Dr. Chance Robertson on 10/15/18\par \par All medical record entries made by the Scribe were at my, Dr. Chance Robertson's, direction and personally dictated by me on 10/15/18. I have reviewed the chart and agree that the record accurately reflects my personal performance of the history, physical exam, assessment and plan. I have also personally directed, reviewed, and agree with the discharge instructions. \par \par \par \par \par

## 2019-05-06 ENCOUNTER — NON-APPOINTMENT (OUTPATIENT)
Age: 74
End: 2019-05-06

## 2019-05-06 ENCOUNTER — APPOINTMENT (OUTPATIENT)
Dept: PULMONOLOGY | Facility: CLINIC | Age: 74
End: 2019-05-06
Payer: MEDICARE

## 2019-05-06 VITALS
BODY MASS INDEX: 21.26 KG/M2 | WEIGHT: 120 LBS | DIASTOLIC BLOOD PRESSURE: 60 MMHG | SYSTOLIC BLOOD PRESSURE: 120 MMHG | HEIGHT: 63 IN | RESPIRATION RATE: 15 BRPM | OXYGEN SATURATION: 98 % | HEART RATE: 94 BPM

## 2019-05-06 PROCEDURE — 94010 BREATHING CAPACITY TEST: CPT

## 2019-05-06 PROCEDURE — 99214 OFFICE O/P EST MOD 30 MIN: CPT | Mod: 25

## 2019-05-06 RX ORDER — FLUTICASONE FUROATE AND VILANTEROL TRIFENATATE 200; 25 UG/1; UG/1
200-25 POWDER RESPIRATORY (INHALATION) DAILY
Qty: 3 | Refills: 1 | Status: ACTIVE | COMMUNITY
Start: 2019-05-06 | End: 1900-01-01

## 2019-05-06 NOTE — PROCEDURE
[FreeTextEntry1] : PFT- spi reveals normal flows; FEV1 was 2.82L which is 140% of predicted; normal flow volume loop \par \par She had a CT scan of the chest performed on 4/30/2019 that revealed emphysema, airways disease, and a few subcm noncalcified pulmonary nodules with further decrease in bronchiectasis associated with groundglass opacity within the right upper lobe and left lower lobe. Stable prominent mediastinal lymph nodes. There is no jenna thoracic lymphadenopathy or active pulmonary parenchymal disease.

## 2019-05-06 NOTE — ADDENDUM
[FreeTextEntry1] : Documented by Sebastian Trejo acting as a scribe for Dr. Chance Robertson on 5/6/2019\par \par All medical record entries made by the Scribe were at my, Dr. Chance Robertson's, direction and personally dictated by me on 5/6/2019. I have reviewed the chart and agree that the record accurately reflects my personal performance of the history, physical exam, assessment and plan. I have also personally directed, reviewed, and agree with the discharge instructions. \par \par \par \par \par

## 2019-05-06 NOTE — REASON FOR VISIT
[Follow-Up] : a follow-up visit [FreeTextEntry1] : abnormal chest CT, reflux, allergic rhinitis, asthma, bronchiectasis, chronic rhinitis, lung nodules and shortness of breath

## 2019-05-06 NOTE — HISTORY OF PRESENT ILLNESS
[FreeTextEntry1] : Ms. Kenney is a 73 year old female presenting to the office for a follow up visit for abnormal chest CT, reflux, allergic rhinitis, asthma, bronchiectasis, chronic rhinitis, lung nodules and shortness of breath. Her chief complaint is back pain. \par - She comes in with chronic back pain \par - She states that she is having issues involving her voice. She states that Dr. Shah feels that her issues are related to phlegm dropping onto her vocal cords. She is scheduled to follow up with an ENT\par - Her sleep is improved, and she feels that she is getting good sleep aside from times in which her back is in severe pain\par - Her bowels are regular\par - She is currently on iron pills for her anemia, which she has had since she was a child\par - Her weight is stable \par - She describes her sinuses as terrible and feels that it is due to the abnormally high pollen count. \par - She notes using eye drops for a burning sensation in her eyes\par - Dr. Lundberg recommended that she take Allergra with her Singulair \par - She is currently down to 60 mg of opioid and 90 mg prn.

## 2019-05-06 NOTE — PHYSICAL EXAM
[General Appearance - Well Developed] : well developed [Well Groomed] : well groomed [Normal Appearance] : normal appearance [General Appearance - Well Nourished] : well nourished [No Deformities] : no deformities [General Appearance - In No Acute Distress] : no acute distress [Normal Conjunctiva] : the conjunctiva exhibited no abnormalities [Eyelids - No Xanthelasma] : the eyelids demonstrated no xanthelasmas [Normal Oropharynx] : normal oropharynx [Neck Appearance] : the appearance of the neck was normal [II] : II [Neck Cervical Mass (___cm)] : no neck mass was observed [Jugular Venous Distention Increased] : there was no jugular-venous distention [Thyroid Diffuse Enlargement] : the thyroid was not enlarged [Thyroid Nodule] : there were no palpable thyroid nodules [Heart Rate And Rhythm] : heart rate and rhythm were normal [Heart Sounds] : normal S1 and S2 [Respiration, Rhythm And Depth] : normal respiratory rhythm and effort [Murmurs] : no murmurs present [Auscultation Breath Sounds / Voice Sounds] : lungs were clear to auscultation bilaterally [Exaggerated Use Of Accessory Muscles For Inspiration] : no accessory muscle use [Abdomen Mass (___ Cm)] : no abdominal mass palpated [Abdomen Soft] : soft [Abdomen Tenderness] : non-tender [Abnormal Walk] : normal gait [Gait - Sufficient For Exercise Testing] : the gait was sufficient for exercise testing [Nail Clubbing] : no clubbing of the fingernails [Cyanosis, Localized] : no localized cyanosis [Petechial Hemorrhages (___cm)] : no petechial hemorrhages [] : no rash [Skin Color & Pigmentation] : normal skin color and pigmentation [No Venous Stasis] : no venous stasis [No Xanthoma] : no  xanthoma was observed [Skin Lesions] : no skin lesions [No Skin Ulcers] : no skin ulcer [No Focal Deficits] : no focal deficits [Deep Tendon Reflexes (DTR)] : deep tendon reflexes were 2+ and symmetric [Sensation] : the sensory exam was normal to light touch and pinprick [Affect] : the affect was normal [Impaired Insight] : insight and judgment were intact [Oriented To Time, Place, And Person] : oriented to person, place, and time [FreeTextEntry1] : I:E 1:3, clear

## 2019-05-06 NOTE — ASSESSMENT
[FreeTextEntry1] : Ms. Kenney has a history of asthma, allergy, and abnormal CT scan, bronchiectasis, GERD, and orthopedic issues. She is currently stable from a pulmonary perspective. \par \par Problem 1: Bronchiectasis\par - Recommended Acapella device.\par Seen on the CT of the chest or chest x-ray signifies damaged bronchial tubes focal or diffuse which can be sites of recurrent infections. These areas can be colonized by various organisms including bacteria (hemophilous influenzae/Pseudomonas species etc.) as well as acid fast bacilli (mycobacterial disease- inclusive of TB/MACI etc.). Sputum either for bacteria culture/sensitivity or AFB culture and sensitivity will need to be sent if the patient has sputum- 3 specimens on consecutive days will need to be dropped at the laboratory- if the patient can produce sputum.\par \par problem 2: asthma (active)- likely\par -continue to use Breo Ellipta 200 at 1 inhalation QD (Noncompliant- needs to improve)\par -add Ventolin as a rescue inhaler 2 inhalations pre-exercise \par -Asthma is  believed to be caused by inherited (genetic) and environmental factor, but its exact cause is unknown. Asthma may be triggered by allergens, lung infections, or irritants in the air. Asthma triggers are different for each person\par -Inhaler technique reviewed as well as oral hygiene techniques reviewed with patient. Avoidance of cold air, extremes of temperature, rescue inhaler should be used before exercise. Order of medication reviewed with patient. Recommended use of a cool mist humidifier in the bedroom.\par \par problem 3: allergic rhinitis\par -Continue Claritin 10mg in the morning \par -continue to use Flonase 1 sniff/nostril BID\par -continue to use Astelin 0.15 1 sniff/nostril BID\par -continue to use Clarinex 5 mg QHS \par - Planning on beginning Allegra OTC QHS\par - Recommended the Navage \par -Environmental measures for allergies were encouraged including mattress and pillow cover, air purifier, and \par \par problem 3: abnormal chest CT, ? early cancer (improved- ?inflammation)\par -follow up chest CT in April 2020\par -Differential diagnosis include: (minimally invasive) cancer, BOOP, or hypersensitivity pneumonitis\par -recommended thoracic surgical evaluation to determine treatment environmental controls.\par \par problem 4: GERD\par -continue to use Zantac 300 mg QHS\par -Rule of 2s: avoid eating too much, eating too late, eating too spicy, eating two hours before bed\par -Things to avoid including overeating, spicy foods, tight clothing, eating within three hours of bed, this list is not all inclusive. \par -For treatment of reflux, possible options discussed including diet control, H2 blockers, PPIs, as well as coating motility agents discussed as treatment options. Timing of meals and proximity of last meal to sleep were discussed. If symptoms persist, a formal gastrointestinal evaluation is needed.\par \par problem 5: sicca\par -continue to use Evoxac 30 mg TID\par \par problem 6: health maintenance \par -recommended yearly flu shot after October 15\par -recommended strep pneumonia vaccines: Prevnar-13 vaccine, followed by Pneumo vaccine 23 one year following\par -recommended early intervention for URIs\par -recommended regular osteoporosis evaluations\par -recommended early dermatological evaluations\par -recommended after the age of 50 to the age of 70, colonoscopy every 5 years \par \par F/U in 4 months\par She is encouraged to call with any changes, concerns, or questions

## 2019-06-03 ENCOUNTER — APPOINTMENT (OUTPATIENT)
Dept: OTOLARYNGOLOGY | Facility: CLINIC | Age: 74
End: 2019-06-03

## 2019-10-30 ENCOUNTER — APPOINTMENT (OUTPATIENT)
Dept: PULMONOLOGY | Facility: CLINIC | Age: 74
End: 2019-10-30

## 2020-01-23 NOTE — ASU PATIENT PROFILE, ADULT - PSH
Length Of Stay
Back pain  Insertion Neurostimulator Metronic 2000   battery change   2002 , 2003, 11/06 off during day time  H/O arthroscopy of knee    S/P appendectomy  1956  S/P cervical discectomy  Anterior  with Fusion C3-C4, C4-C5, C5-C6, C6-C7 2006  S/P D&C (status post dilation and curettage)  x 2  & Cone biopsy 1996  S/P laminectomy with spinal fusion  Thorasic Posterior Arthrodesis  decompresion 11/06  S/P lumbar laminectomy  Decompressive Laminectomy L-3 -4  L4--5 2003   Lumbar  Arthrodesis  posterior instrumentation 4/04  S/P lumbar laminectomy  1995  S/P tonsillectomy  1948  Status post cataract extraction

## 2020-08-27 ENCOUNTER — APPOINTMENT (OUTPATIENT)
Dept: PULMONOLOGY | Facility: CLINIC | Age: 75
End: 2020-08-27
Payer: MEDICARE

## 2020-08-27 VITALS
HEART RATE: 86 BPM | OXYGEN SATURATION: 98 % | DIASTOLIC BLOOD PRESSURE: 72 MMHG | BODY MASS INDEX: 20.46 KG/M2 | TEMPERATURE: 95.6 F | WEIGHT: 115.5 LBS | HEIGHT: 63 IN | RESPIRATION RATE: 17 BRPM | SYSTOLIC BLOOD PRESSURE: 144 MMHG

## 2020-08-27 PROCEDURE — 95012 NITRIC OXIDE EXP GAS DETER: CPT

## 2020-08-27 PROCEDURE — 99214 OFFICE O/P EST MOD 30 MIN: CPT | Mod: 25

## 2020-08-27 NOTE — HISTORY OF PRESENT ILLNESS
[FreeTextEntry1] : Ms. Kenney is a 74 year old female presenting to the office for a follow up visit for abnormal chest CT, reflux, allergic rhinitis, asthma, bronchiectasis, chronic rhinitis, lung nodules and shortness of breath. Her chief complaint is\par \par -she notes sick 3/2020 after returning from trip to [place] with Sx of severe fatigue, constant HA, fever of 102, mild SOB alleviated by rescue inhaler, diarrhea and recovered after 2 weeks\par -she notes now:\par -she notes generally feeling well\par -she notes dysphagia worse with pills and foots exacerbated when taking Fosamax\par -she notes anxiety over scheduling an invasive procedure during COVID pandemic\par -she notes weight has rebounded to 115 lbs\par -she arthralgias in back radiating to hip and legs, now planning orthopedic and neurologic visits\par -she notes SOB and allergies exacerbated by humidity and change in weather\par \par -she denies any headaches, nausea, vomiting, fever, chills, sweats, chest pain, chest pressure, diarrhea, constipation, dizziness, sour taste in the mouth, leg swelling, leg pain, itchy eyes, itchy ears, heartburn, reflux, myalgias.

## 2020-08-27 NOTE — ADDENDUM
[FreeTextEntry1] : Documented by Arnulfo Tee acting as a scribe for Dr. Chance Robertson on 08/27/2020.\par \par All medical record entries made by the Scribe were at my, Dr. Chance Robertson's, direction and personally dictated by me on 08/27/2020. I have reviewed the chart and agree that the record accurately reflects my personal performance of the history, physical exam, assessment and plan. I have also personally directed, reviewed, and agree with the discharge instructions. \par \par \par \par \par

## 2020-08-27 NOTE — PHYSICAL EXAM
[Normal Appearance] : normal appearance [General Appearance - Well Developed] : well developed [General Appearance - Well Nourished] : well nourished [Well Groomed] : well groomed [General Appearance - In No Acute Distress] : no acute distress [No Deformities] : no deformities [Normal Oropharynx] : normal oropharynx [Eyelids - No Xanthelasma] : the eyelids demonstrated no xanthelasmas [Normal Conjunctiva] : the conjunctiva exhibited no abnormalities [Neck Cervical Mass (___cm)] : no neck mass was observed [II] : II [Neck Appearance] : the appearance of the neck was normal [Jugular Venous Distention Increased] : there was no jugular-venous distention [Thyroid Nodule] : there were no palpable thyroid nodules [Thyroid Diffuse Enlargement] : the thyroid was not enlarged [Heart Rate And Rhythm] : heart rate and rhythm were normal [Heart Sounds] : normal S1 and S2 [Murmurs] : no murmurs present [Respiration, Rhythm And Depth] : normal respiratory rhythm and effort [Exaggerated Use Of Accessory Muscles For Inspiration] : no accessory muscle use [Auscultation Breath Sounds / Voice Sounds] : lungs were clear to auscultation bilaterally [Abdomen Tenderness] : non-tender [Abdomen Soft] : soft [Gait - Sufficient For Exercise Testing] : the gait was sufficient for exercise testing [Abdomen Mass (___ Cm)] : no abdominal mass palpated [Abnormal Walk] : normal gait [Nail Clubbing] : no clubbing of the fingernails [Cyanosis, Localized] : no localized cyanosis [Petechial Hemorrhages (___cm)] : no petechial hemorrhages [Skin Color & Pigmentation] : normal skin color and pigmentation [] : no rash [No Venous Stasis] : no venous stasis [Skin Lesions] : no skin lesions [No Skin Ulcers] : no skin ulcer [No Xanthoma] : no  xanthoma was observed [Deep Tendon Reflexes (DTR)] : deep tendon reflexes were 2+ and symmetric [Sensation] : the sensory exam was normal to light touch and pinprick [No Focal Deficits] : no focal deficits [Impaired Insight] : insight and judgment were intact [Oriented To Time, Place, And Person] : oriented to person, place, and time [Affect] : the affect was normal [FreeTextEntry1] : I:E 1:3, mild expiratory wheeze

## 2020-08-27 NOTE — ASSESSMENT
[FreeTextEntry1] : Ms. Kenney is a 74 year old female who has a history of DM, HLD, HTN, hypothyroid, IBS, OA, asthma, allergy, and abnormal CT scan, bronchiectasis, GERD, and orthopedic issues. She is currently stable from a pulmonary perspective. \par \par Problem 1: Bronchiectasis\par - Recommended Acapella device.\par Seen on the CT of the chest or chest x-ray signifies damaged bronchial tubes focal or diffuse which can be sites of recurrent infections. These areas can be colonized by various organisms including bacteria (hemophilous influenzae/Pseudomonas species etc.) as well as acid fast bacilli (mycobacterial disease- inclusive of TB/MACI etc.). Sputum either for bacteria culture/sensitivity or AFB culture and sensitivity will need to be sent if the patient has sputum- 3 specimens on consecutive days will need to be dropped at the laboratory- if the patient can produce sputum.\par \par problem 2: asthma (active)- likely\par -continue to use Breo Ellipta 200 at 1 inhalation QD (Noncompliant- needs to improve)\par -continue Ventolin as a rescue inhaler 2 inhalations pre-exercise \par -Asthma is  believed to be caused by inherited (genetic) and environmental factor, but its exact cause is unknown. Asthma may be triggered by allergens, lung infections, or irritants in the air. Asthma triggers are different for each person\par -Inhaler technique reviewed as well as oral hygiene techniques reviewed with patient. Avoidance of cold air, extremes of temperature, rescue inhaler should be used before exercise. Order of medication reviewed with patient. Recommended use of a cool mist humidifier in the bedroom.\par \par problem 3: allergic rhinitis\par -Continue Claritin 10mg in the morning \par -continue to use Flonase 1 sniff/nostril BID\par -continue to use Astelin 0.15 1 sniff/nostril BID\par -continue to use Clarinex 5 mg QHS \par - Planning on beginning Allegra OTC QHS\par - Recommended the Navage \par -Environmental measures for allergies were encouraged including mattress and pillow cover, air purifier, and \par \par problem 3: abnormal chest CT, ? early cancer (improved- ?inflammation)\par -follow up chest CT in April 2020 (Overdue)\par -Differential diagnosis include: (minimally invasive) cancer, BOOP, or hypersensitivity pneumonitis\par -recommended thoracic surgical evaluation to determine treatment environmental controls.\par \par problem 4: GERD\par -Add Pepcid 40 mg QHS \par -Rule of 2s: avoid eating too much, eating too late, eating too spicy, eating two hours before bed\par -Things to avoid including overeating, spicy foods, tight clothing, eating within three hours of bed, this list is not all inclusive. \par -For treatment of reflux, possible options discussed including diet control, H2 blockers, PPIs, as well as coating motility agents discussed as treatment options. Timing of meals and proximity of last meal to sleep were discussed. If symptoms persist, a formal gastrointestinal evaluation is needed.\par \par problem 5: sicca\par -continue to use Evoxac 30 mg TID\par \par problem 6: Health Maintenance/COVID19 Precautions:\par - Clean your hands often. Wash your hands often with soap and water for at least 20 seconds, especially after blowing your nose, coughing, or sneezing, or having been in a public place.\par - If soap and water are not available, use a hand  that contains at least 60% alcohol.\par - To the extent possible, avoid touching high-touch surfaces in public places - elevator buttons, door handles, handrails, handshaking with people, etc. Use a tissue or your sleeve to cover your hand or finger if you must touch something.\par - Wash your hands after touching surfaces in public places.\par - Avoid touching your face, nose, eyes, etc.\par - Clean and disinfect your home to remove germs: practice routine cleaning of frequently touched surfaces (for example: tables, doorknobs, light switches, handles, desks, toilets, faucets, sinks & cell phones)\par - Avoid crowds, especially in poorly ventilated spaces. Your risk of exposure to respiratory viruses like COVID-19 may increase in crowded, closed-in settings with little air circulation if there are people in the crowd who are sick. All patients are recommended to practice social distancing and stay at least 6 feet away from others.\par - Avoid all non-essential travel including plane trips, and especially avoid embarking on cruise ships.\par -If COVID-19 is spreading in your community, take extra measures to put distance between yourself and other people to further reduce your risk of being exposed to this new virus.\par -Stay home as much as possible.\par - Consider ways of getting food brought to your house through family, social, or commercial networks\par -Be aware that the virus has been known to live in the air up to 3 hours post exposure, cardboard up to 24 hours post exposure, copper up to 4 hours post exposure, steel and plastic up to 2-3 days post exposure. Risk of transmission from these surfaces are affected by many variables.\par Immune Support Recommendations:\par -OTC Vitamin C 500mg BID \par -OTC Quercetin 250-500mg BID \par -OTC Zinc 75-100mg per day \par -OTC Melatonin 1 or 2 mg a night \par -OTC Vitamin D 1-4000mg per day \par -OTC Tonic Water 8oz per day\par Asthma and COVID19:\par You need to make sure your asthma is under control. This often requires the use of inhaled corticosteroids (and sometimes oral corticosteroids). Inhaled corticosteroids do not likely reduce your immune system’s ability to fight infections, but oral corticosteroids may. It is important to use the steps above to protect yourself to limit your exposure to any respiratory virus. \par \par problem 7: health maintenance \par -recommended yearly flu shot after October 15\par -recommended strep pneumonia vaccines: Prevnar-13 vaccine, followed by Pneumo vaccine 23 one year following\par -recommended early intervention for URIs\par -recommended regular osteoporosis evaluations\par -recommended early dermatological evaluations\par -recommended after the age of 50 to the age of 70, colonoscopy every 5 years \par \par F/U in 4 months\par She is encouraged to call with any changes, concerns, or questions

## 2020-08-27 NOTE — PROCEDURE
[FreeTextEntry1] : FENO was 31; a normal value being less than 25\par Fractional exhaled nitric oxide (FENO) is regarded as a simple, noninvasive method for assessing eosinophilic airway inflammation. Produced by a variety of cells within the lung, nitric oxide (NO) concentrations are generally low in healthy individuals. However, high concentrations of NO appear to be involved in nonspecific host defense mechanisms and chronic inflammatory diseases such as asthma. The American Thoracic Society (ATS) therefore has recommended using FENO to aid in the diagnosis and monitoring of eosinophilic airway inflammation and asthma, and for identifying steroid responsive individuals whose chronic respiratory symptoms may be caused by airway inflammation.

## 2020-11-02 ENCOUNTER — APPOINTMENT (OUTPATIENT)
Dept: CT IMAGING | Facility: CLINIC | Age: 75
End: 2020-11-02
Payer: MEDICARE

## 2020-11-02 ENCOUNTER — RESULT REVIEW (OUTPATIENT)
Age: 75
End: 2020-11-02

## 2020-11-02 ENCOUNTER — OUTPATIENT (OUTPATIENT)
Dept: OUTPATIENT SERVICES | Facility: HOSPITAL | Age: 75
LOS: 1 days | End: 2020-11-02
Payer: MEDICARE

## 2020-11-02 DIAGNOSIS — Z98.89 OTHER SPECIFIED POSTPROCEDURAL STATES: Chronic | ICD-10-CM

## 2020-11-02 DIAGNOSIS — Z98.1 ARTHRODESIS STATUS: Chronic | ICD-10-CM

## 2020-11-02 DIAGNOSIS — R91.8 OTHER NONSPECIFIC ABNORMAL FINDING OF LUNG FIELD: ICD-10-CM

## 2020-11-02 DIAGNOSIS — Z98.49 CATARACT EXTRACTION STATUS, UNSPECIFIED EYE: Chronic | ICD-10-CM

## 2020-11-02 DIAGNOSIS — M54.9 DORSALGIA, UNSPECIFIED: Chronic | ICD-10-CM

## 2020-11-02 PROCEDURE — 71250 CT THORAX DX C-: CPT

## 2020-11-02 PROCEDURE — 71250 CT THORAX DX C-: CPT | Mod: 26

## 2020-11-06 ENCOUNTER — NON-APPOINTMENT (OUTPATIENT)
Age: 75
End: 2020-11-06

## 2020-12-01 ENCOUNTER — APPOINTMENT (OUTPATIENT)
Dept: NEUROSURGERY | Facility: CLINIC | Age: 75
End: 2020-12-01
Payer: MEDICARE

## 2020-12-01 VITALS — TEMPERATURE: 97.2 F

## 2020-12-01 VITALS
HEIGHT: 63 IN | OXYGEN SATURATION: 100 % | RESPIRATION RATE: 15 BRPM | SYSTOLIC BLOOD PRESSURE: 167 MMHG | DIASTOLIC BLOOD PRESSURE: 76 MMHG | BODY MASS INDEX: 20.38 KG/M2 | WEIGHT: 115 LBS | HEART RATE: 87 BPM

## 2020-12-01 DIAGNOSIS — Z01.812 ENCOUNTER FOR PREPROCEDURAL LABORATORY EXAMINATION: ICD-10-CM

## 2020-12-01 PROCEDURE — 99214 OFFICE O/P EST MOD 30 MIN: CPT

## 2020-12-02 ENCOUNTER — TRANSCRIPTION ENCOUNTER (OUTPATIENT)
Age: 75
End: 2020-12-02

## 2020-12-10 NOTE — PHYSICAL EXAM
[General Appearance - Alert] : alert [General Appearance - In No Acute Distress] : in no acute distress [Oriented To Time, Place, And Person] : oriented to person, place, and time [Impaired Insight] : insight and judgment were intact [Cranial Nerves Optic (II)] : visual acuity intact bilaterally,  pupils equal round and reactive to light [Cranial Nerves Oculomotor (III)] : extraocular motion intact [Cranial Nerves Trigeminal (V)] : facial sensation intact symmetrically [Cranial Nerves Facial (VII)] : face symmetrical [Cranial Nerves Vestibulocochlear (VIII)] : hearing was intact bilaterally [Cranial Nerves Glossopharyngeal (IX)] : tongue and palate midline [Cranial Nerves Accessory (XI - Cranial And Spinal)] : head turning and shoulder shrug symmetric [Cranial Nerves Hypoglossal (XII)] : there was no tongue deviation with protrusion [Motor Strength] : muscle strength was normal in all four extremities [No Visual Abnormalities] : no visible abnormailities [No Tenderness to Palpation] : no spine tenderness on palpation [No Masses] : no masses [Normal] : normal [Intact] : all sensory within normal limits bilaterally [PERRL With Normal Accommodation] : pupils were equal in size, round, reactive to light, with normal accommodation [Neck Appearance] : the appearance of the neck was normal [] : no respiratory distress [Respiration, Rhythm And Depth] : normal respiratory rhythm and effort [Edema] : there was no peripheral edema [Abnormal Walk] : normal gait [Involuntary Movements] : no involuntary movements were seen [Motor Tone] : muscle strength and tone were normal [Skin Color & Pigmentation] : normal skin color and pigmentation [Limited Balance] : balance was intact [Tremor] : no tremor present [Straight-Leg Raise Test - Left] : straight leg raise of the left leg was negative [Straight-Leg Raise Test - Right] : straight leg raise  of the right leg was negative

## 2020-12-10 NOTE — ASSESSMENT
[FreeTextEntry1] : 73 yo female with chronic back pain whose existing [employer] SCS IPG has reached end of life.  She benefits from the SCS stimulation with very good pain relief and improved quality of life.  She takes less pain medication having reduced Morphine from 120 mg/day to 60 mg /day.  She is indicated for an IPG replacement.  After discussion of the risks, benefits, and alternatives to IPG replacement, she wishes to proceed.\par \par 1)  Schedule SCS IPG replacement due to End of LIfe

## 2020-12-10 NOTE — HISTORY OF PRESENT ILLNESS
[FreeTextEntry1] : Mrs. Kenney is a 73 yo woman with PMH of HTN, DM2, asthma, GERD, COPD, multiple spine surgeries, with chronic back pain s/p SCS ([employer]tronic).  10/11/17 we replaced the SCS battery due to end of life, and now she arrives for same SCS IPG end of life.  She reports very good benefit with pain relief from the existing SCS and would like to have it replaced.  \par \par 11/7/2017 Back pain is now 3/10 with stim, ranging from 0/10 to 7/10 without stim.  [employer] rep Lex Jones present today.  \par Pain management:  Dr. Alex\par Pain medications:  Morphine 60 mg / day (previously 120 mg /day- without SCS)\par \par She denies headaches, sudden weakness, nausea, vomiting, or fevers.  She denies chest pain, palpitations, shortness of breath, visual, hearing, or speech disturbances.

## 2020-12-14 ENCOUNTER — OUTPATIENT (OUTPATIENT)
Dept: OUTPATIENT SERVICES | Facility: HOSPITAL | Age: 75
LOS: 1 days | End: 2020-12-14
Payer: MEDICARE

## 2020-12-14 ENCOUNTER — OUTPATIENT (OUTPATIENT)
Dept: OUTPATIENT SERVICES | Facility: HOSPITAL | Age: 75
LOS: 1 days | End: 2020-12-14

## 2020-12-14 VITALS
SYSTOLIC BLOOD PRESSURE: 142 MMHG | HEIGHT: 62 IN | OXYGEN SATURATION: 100 % | TEMPERATURE: 98 F | WEIGHT: 115.08 LBS | DIASTOLIC BLOOD PRESSURE: 78 MMHG | HEART RATE: 89 BPM | RESPIRATION RATE: 16 BRPM

## 2020-12-14 DIAGNOSIS — Z98.1 ARTHRODESIS STATUS: Chronic | ICD-10-CM

## 2020-12-14 DIAGNOSIS — Z98.89 OTHER SPECIFIED POSTPROCEDURAL STATES: Chronic | ICD-10-CM

## 2020-12-14 DIAGNOSIS — J44.9 CHRONIC OBSTRUCTIVE PULMONARY DISEASE, UNSPECIFIED: ICD-10-CM

## 2020-12-14 DIAGNOSIS — M54.9 DORSALGIA, UNSPECIFIED: Chronic | ICD-10-CM

## 2020-12-14 DIAGNOSIS — Z11.59 ENCOUNTER FOR SCREENING FOR OTHER VIRAL DISEASES: ICD-10-CM

## 2020-12-14 DIAGNOSIS — Z96.89 PRESENCE OF OTHER SPECIFIED FUNCTIONAL IMPLANTS: Chronic | ICD-10-CM

## 2020-12-14 DIAGNOSIS — Z98.49 CATARACT EXTRACTION STATUS, UNSPECIFIED EYE: Chronic | ICD-10-CM

## 2020-12-14 DIAGNOSIS — G89.29 OTHER CHRONIC PAIN: ICD-10-CM

## 2020-12-14 DIAGNOSIS — I10 ESSENTIAL (PRIMARY) HYPERTENSION: ICD-10-CM

## 2020-12-14 DIAGNOSIS — M54.9 DORSALGIA, UNSPECIFIED: ICD-10-CM

## 2020-12-14 LAB
ANION GAP SERPL CALC-SCNC: 14 MMOL/L — SIGNIFICANT CHANGE UP (ref 5–17)
BUN SERPL-MCNC: 24 MG/DL — HIGH (ref 7–23)
CALCIUM SERPL-MCNC: 9.8 MG/DL — SIGNIFICANT CHANGE UP (ref 8.4–10.5)
CHLORIDE SERPL-SCNC: 102 MMOL/L — SIGNIFICANT CHANGE UP (ref 96–108)
CO2 SERPL-SCNC: 19 MMOL/L — LOW (ref 22–31)
CREAT SERPL-MCNC: 0.84 MG/DL — SIGNIFICANT CHANGE UP (ref 0.5–1.3)
GLUCOSE SERPL-MCNC: 168 MG/DL — HIGH (ref 70–99)
HCT VFR BLD CALC: 33.5 % — LOW (ref 34.5–45)
HGB BLD-MCNC: 11 G/DL — LOW (ref 11.5–15.5)
MCHC RBC-ENTMCNC: 30.8 PG — SIGNIFICANT CHANGE UP (ref 27–34)
MCHC RBC-ENTMCNC: 32.8 GM/DL — SIGNIFICANT CHANGE UP (ref 32–36)
MCV RBC AUTO: 93.8 FL — SIGNIFICANT CHANGE UP (ref 80–100)
NRBC # BLD: 0 /100 WBCS — SIGNIFICANT CHANGE UP (ref 0–0)
PLATELET # BLD AUTO: 256 K/UL — SIGNIFICANT CHANGE UP (ref 150–400)
POTASSIUM SERPL-MCNC: 3.8 MMOL/L — SIGNIFICANT CHANGE UP (ref 3.5–5.3)
POTASSIUM SERPL-SCNC: 3.8 MMOL/L — SIGNIFICANT CHANGE UP (ref 3.5–5.3)
RBC # BLD: 3.57 M/UL — LOW (ref 3.8–5.2)
RBC # FLD: 12.7 % — SIGNIFICANT CHANGE UP (ref 10.3–14.5)
SARS-COV-2 RNA SPEC QL NAA+PROBE: SIGNIFICANT CHANGE UP
SODIUM SERPL-SCNC: 135 MMOL/L — SIGNIFICANT CHANGE UP (ref 135–145)
WBC # BLD: 7.26 K/UL — SIGNIFICANT CHANGE UP (ref 3.8–10.5)
WBC # FLD AUTO: 7.26 K/UL — SIGNIFICANT CHANGE UP (ref 3.8–10.5)

## 2020-12-14 PROCEDURE — 87641 MR-STAPH DNA AMP PROBE: CPT

## 2020-12-14 PROCEDURE — 87640 STAPH A DNA AMP PROBE: CPT

## 2020-12-14 PROCEDURE — 80048 BASIC METABOLIC PNL TOTAL CA: CPT

## 2020-12-14 PROCEDURE — G0463: CPT

## 2020-12-14 PROCEDURE — 85027 COMPLETE CBC AUTOMATED: CPT

## 2020-12-14 PROCEDURE — U0003: CPT

## 2020-12-14 RX ORDER — RANITIDINE HYDROCHLORIDE 150 MG/1
1 TABLET, FILM COATED ORAL
Qty: 0 | Refills: 0 | DISCHARGE

## 2020-12-14 RX ORDER — TOPIRAMATE 25 MG
2 TABLET ORAL
Qty: 0 | Refills: 0 | DISCHARGE

## 2020-12-14 RX ORDER — LIDOCAINE HCL 20 MG/ML
0.2 VIAL (ML) INJECTION ONCE
Refills: 0 | Status: DISCONTINUED | OUTPATIENT
Start: 2020-12-16 | End: 2020-12-30

## 2020-12-14 RX ORDER — CHLORHEXIDINE GLUCONATE 213 G/1000ML
1 SOLUTION TOPICAL ONCE
Refills: 0 | Status: DISCONTINUED | OUTPATIENT
Start: 2020-12-16 | End: 2020-12-30

## 2020-12-14 RX ORDER — AZELASTINE 137 UG/1
1 SPRAY, METERED NASAL
Qty: 0 | Refills: 0 | DISCHARGE

## 2020-12-14 RX ORDER — SODIUM CHLORIDE 9 MG/ML
3 INJECTION INTRAMUSCULAR; INTRAVENOUS; SUBCUTANEOUS EVERY 8 HOURS
Refills: 0 | Status: DISCONTINUED | OUTPATIENT
Start: 2020-12-16 | End: 2020-12-30

## 2020-12-14 NOTE — H&P PST ADULT - NEGATIVE RESPIRATORY AND THORAX SYMPTOMS
no wheezing/no dyspnea/no cough no wheezing/no dyspnea/no cough/no hemoptysis/no pleuritic chest pain

## 2020-12-14 NOTE — H&P PST ADULT - NSICDXPROBLEM_GEN_ALL_CORE_FT
PROBLEM DIAGNOSES  Problem: HTN (hypertension)  Assessment and Plan: Instructed to continue meds &  take with sips of water in AM the day of surgery     Problem: COPD without exacerbation  Assessment and Plan: Instructed to continue meds &  take with sips of water in AM the day of surgery   Preop pulm note/last CT report  in chart    Problem: Chronic back pain  Assessment and Plan: Replacement of spinal cord stimulator pulse generator  Preop covid test done today in [facility]

## 2020-12-14 NOTE — H&P PST ADULT - MALLAMPATI CLASS
with phonation/Class I (easy) - visualization of the soft palate, fauces, uvula, and both anterior and posterior pillars with phonation/Class II - visualization of the soft palate, fauces, and uvula

## 2020-12-14 NOTE — H&P PST ADULT - NEGATIVE NEUROLOGICAL SYMPTOMS
no transient paralysis/no weakness/no generalized seizures/no focal seizures no transient paralysis/no weakness/no generalized seizures/no focal seizures/no difficulty walking

## 2020-12-14 NOTE — H&P PST ADULT - HISTORY OF PRESENT ILLNESS
75 year old LHD female  75 year old LHD female PMH, HTN, COPD, GERD & Chronic back pain from lumbar stenosis,, scoliosis, had multiple back surgeries, s/p SCS placed in 10/2017 replaced batteries due to end of life, now she arrives for same SCS IPG end of life. She reports very good benefit with pain relief from SCS, scheduled for replacement of spinal cord stimulator pulse generator placement on 12/16/2020. 75 year old LHD female PMH, HTN, COPD, GERD & Chronic back pain from lumbar stenosis,, scoliosis, had multiple back surgeries, s/p SCS placed in 10/2017 replaced batteries due to end of life, now she arrives for same SCS IPG end of life. She reports very good benefit with pain relief from SCS, scheduled for replacement of spinal cord stimulator pulse generator placement on 12/16/2020.    ++Addendum 12/15/20- Advised Dr Pak via email Pt is positive for Staph Aureus PCR and also spoke to his  Charissa. Advised to start Bactroban ointment however it is usually for 5 days preop.

## 2020-12-14 NOTE — H&P PST ADULT - MUSCULOSKELETAL
details… detailed exam no cervical LAD/ROM intact/no joint swelling/no joint erythema/no joint warmth/no calf tenderness

## 2020-12-14 NOTE — H&P PST ADULT - NSICDXPASTSURGICALHX_GEN_ALL_CORE_FT
PAST SURGICAL HISTORY:  Back pain Insertion Neurostimulator Metronic 2000   battery change   2002 , 2003, 11/06 off during day time    H/O arthroscopy of knee     S/P appendectomy 1956    S/P cervical discectomy Anterior  with Fusion C3-C4, C4-C5, C5-C6, C6-C7 2006    S/P D&C (status post dilation and curettage) x 2  & Cone biopsy 1996    S/P laminectomy with spinal fusion Thorasic Posterior Arthrodesis  decompresion 11/06 2014 - Fusion of T8-T10 correction of scoliosis  2016 - L5-S1 stabilization reexploration of fusion    S/P lumbar laminectomy 1995    S/P lumbar laminectomy Decompressive Laminectomy L-3 -4  L4--5 2003   Lumbar  Arthrodesis  posterior instrumentation 4/04    S/P tonsillectomy 1948    Spinal cord stimulator status placed in 1999, replaced many times    Status post cataract extraction

## 2020-12-14 NOTE — H&P PST ADULT - NSICDXPASTMEDICALHX_GEN_ALL_CORE_FT
PAST MEDICAL HISTORY:  Anemia h/o    Anxiety h/o    Asthma Allergy induced    Chronic pain disorder h/o scoliosis  neuropathy/ lumbar radiculopathy from lumbar stenosis  1999 pain management DR Vargas  2004 /  [phone]    GERD (gastroesophageal reflux disease)     HTN (hypertension)     Hypothyroid     Migraine h/o    Spinal stenosis     Type 2 diabetes mellitus h/o DM last  A1C was 5.9 , currently no ton meds     PAST MEDICAL HISTORY:  Anemia h/o    Anxiety h/o    Asthma Allergy induced    Chronic pain disorder h/o scoliosis  neuropathy/ lumbar radiculopathy from lumbar stenosis  1999 pain management DR Vargas  2004 /  [phone]    GERD (gastroesophageal reflux disease)     History of fever 03/2020 after returning from [place], with fatigue, head ache relieved by resque inhaler, probably was due to Flu    HTN (hypertension)     Hypothyroid     Lung nodule 2017, on follow up with Dr bree celeste, [facility] visit 11/2020, last Ct 11/02/2020 , same like last year    Migraine h/o    Spinal stenosis     Type 2 diabetes mellitus h/o DM last  A1C was 5.9 , currently no ton meds

## 2020-12-15 ENCOUNTER — APPOINTMENT (OUTPATIENT)
Dept: PULMONOLOGY | Facility: CLINIC | Age: 75
End: 2020-12-15
Payer: MEDICARE

## 2020-12-15 ENCOUNTER — TRANSCRIPTION ENCOUNTER (OUTPATIENT)
Age: 75
End: 2020-12-15

## 2020-12-15 VITALS
OXYGEN SATURATION: 97 % | RESPIRATION RATE: 16 BRPM | WEIGHT: 114 LBS | TEMPERATURE: 97.6 F | DIASTOLIC BLOOD PRESSURE: 60 MMHG | SYSTOLIC BLOOD PRESSURE: 120 MMHG | BODY MASS INDEX: 20.98 KG/M2 | HEART RATE: 83 BPM | HEIGHT: 62 IN

## 2020-12-15 DIAGNOSIS — Z01.811 ENCOUNTER FOR PREPROCEDURAL RESPIRATORY EXAMINATION: ICD-10-CM

## 2020-12-15 DIAGNOSIS — Z22.321 CARRIER OR SUSPECTED CARRIER OF METHICILLIN SUSCEPTIBLE STAPHYLOCOCCUS AUREUS: ICD-10-CM

## 2020-12-15 LAB
MRSA PCR RESULT.: SIGNIFICANT CHANGE UP
S AUREUS DNA NOSE QL NAA+PROBE: DETECTED

## 2020-12-15 PROCEDURE — 99214 OFFICE O/P EST MOD 30 MIN: CPT | Mod: 25

## 2020-12-15 PROCEDURE — 94727 GAS DIL/WSHOT DETER LNG VOL: CPT

## 2020-12-15 PROCEDURE — 94010 BREATHING CAPACITY TEST: CPT

## 2020-12-15 PROCEDURE — ZZZZZ: CPT

## 2020-12-15 PROCEDURE — 94729 DIFFUSING CAPACITY: CPT

## 2020-12-15 RX ORDER — MUPIROCIN 20 MG/G
2 OINTMENT TOPICAL
Qty: 1 | Refills: 0 | Status: ACTIVE | COMMUNITY
Start: 2020-12-15 | End: 1900-01-01

## 2020-12-15 NOTE — ASSESSMENT
[FreeTextEntry1] : Pt is currently stable from a pulmonary perspective. \par \par Problem 1: Pre op clearance for battery replacement for her SCS\par -Pt with low risk for pulmonary complications\par -at this point in time there are no absolute pulmonary contraindications and can move forward with the planned procedure \par -at the time of surgery she should have optimal pain control, incentive spirometry, early ambulation, DVT and GI prophylaxis.\par \par Problem 2: Bronchiectasis\par -stable without issue\par -CT scans following this\par -acapella device if needed\par \par problem 3: Allergic asthma hx-\par -continue Ventolin as a rescue inhaler 2 inhalations as needed PRN SOB\par -BREO is currently on hold, if asthma symptoms begin- restart 200 mcg 1 puff once daily (pt has at home) rinse and gargle\par \par problem 4: allergic rhinitis-stable\par -continue to use Flonase 1 sniff/nostril BID\par -Allegra OTC PRN\par -Navage as needed\par -Environmental measures for allergies were encouraged including mattress and pillow cover, air purifier, and \par \par problem 5: abnormal chest CT\par -11/2020 scan was stable, next CT scan due 11/2021\par \par problem 6: GERD-stable\par -continue with PPI\par -Rule of 2s: avoid eating too much, eating too late, eating too spicy, eating two hours before bed\par \par Asthma and COVID19:\par You need to make sure your asthma is under control. This often requires the use of inhaled corticosteroids (and sometimes oral corticosteroids). Inhaled corticosteroids do not likely reduce your immune system’s ability to fight infections, but oral corticosteroids may. It is important to use the steps above to protect yourself to limit your exposure to any respiratory virus. \par \par F/U in 4 months or sooner if pulmonary issues arise\par She is encouraged to call with any changes, concerns, or questions

## 2020-12-15 NOTE — PHYSICAL EXAM
[General Appearance - Well Developed] : well developed [Normal Appearance] : normal appearance [Well Groomed] : well groomed [General Appearance - Well Nourished] : well nourished [No Deformities] : no deformities [General Appearance - In No Acute Distress] : no acute distress [Normal Conjunctiva] : the conjunctiva exhibited no abnormalities [Eyelids - No Xanthelasma] : the eyelids demonstrated no xanthelasmas [Normal Oropharynx] : normal oropharynx [II] : II [Neck Appearance] : the appearance of the neck was normal [Neck Cervical Mass (___cm)] : no neck mass was observed [Jugular Venous Distention Increased] : there was no jugular-venous distention [Thyroid Diffuse Enlargement] : the thyroid was not enlarged [Thyroid Nodule] : there were no palpable thyroid nodules [Heart Rate And Rhythm] : heart rate and rhythm were normal [Heart Sounds] : normal S1 and S2 [Murmurs] : no murmurs present [Respiration, Rhythm And Depth] : normal respiratory rhythm and effort [Exaggerated Use Of Accessory Muscles For Inspiration] : no accessory muscle use [Auscultation Breath Sounds / Voice Sounds] : lungs were clear to auscultation bilaterally [Abdomen Soft] : soft [Abdomen Tenderness] : non-tender [Abdomen Mass (___ Cm)] : no abdominal mass palpated [Abnormal Walk] : normal gait [Gait - Sufficient For Exercise Testing] : the gait was sufficient for exercise testing [Nail Clubbing] : no clubbing of the fingernails [Cyanosis, Localized] : no localized cyanosis [Petechial Hemorrhages (___cm)] : no petechial hemorrhages [Skin Color & Pigmentation] : normal skin color and pigmentation [] : no rash [No Venous Stasis] : no venous stasis [Skin Lesions] : no skin lesions [No Skin Ulcers] : no skin ulcer [No Xanthoma] : no  xanthoma was observed [Deep Tendon Reflexes (DTR)] : deep tendon reflexes were 2+ and symmetric [Sensation] : the sensory exam was normal to light touch and pinprick [No Focal Deficits] : no focal deficits [Oriented To Time, Place, And Person] : oriented to person, place, and time [Impaired Insight] : insight and judgment were intact [Affect] : the affect was normal [FreeTextEntry1] : I:E 1:3, mild expiratory wheeze

## 2020-12-15 NOTE — HISTORY OF PRESENT ILLNESS
[FreeTextEntry1] : Ms. Kenney is a 75 year old female presenting to the office for pre op pulmonary clearance for battery replacement for her spinal cord stimulator tomorrow 12/16/2020.  She has PMHx of abnormal chest CT, reflux, allergic rhinitis, allergic asthma, bronchiectasis, chronic rhinitis, lung nodules and shortness of breath.\par \par Pt reports she is in her normal state of health. No recent illnesses. \par She reports that her asthma has been stable- has been off Breo without issue. Has only needed her Albuterol rescue inhaler 1 x in the last month. Potpourri in a small restaurant bathroom triggered her asthma symptoms but were relieved with albuterol and fresh air.\par She reports her GERD is under control and is using a PPI daily. \par Allergies/sinuses are also stable on allegra and Flonase. \par She reports sleeping well and better than prior. \par Her main issue is chronic pain from neuropathy. \par \par She other wise reports her appetite is stable, denies fever, chills, night sweats. Denies cough, SOB, chest pain or pressure. Denies nausea, vomiting, reflux, diarrhea, constipation, dizziness, sour taste in the mouth, leg swelling, leg pain, itchy eyes, itchy ears, or sinus issues.\par \par PFT completed today. \par Last CT scan was Nov 2020 and stable from prior.

## 2020-12-16 ENCOUNTER — OUTPATIENT (OUTPATIENT)
Dept: OUTPATIENT SERVICES | Facility: HOSPITAL | Age: 75
LOS: 1 days | End: 2020-12-16
Payer: MEDICARE

## 2020-12-16 ENCOUNTER — APPOINTMENT (OUTPATIENT)
Dept: NEUROSURGERY | Facility: HOSPITAL | Age: 75
End: 2020-12-16

## 2020-12-16 VITALS
TEMPERATURE: 97 F | HEART RATE: 82 BPM | RESPIRATION RATE: 16 BRPM | SYSTOLIC BLOOD PRESSURE: 136 MMHG | WEIGHT: 115.08 LBS | HEIGHT: 62 IN | DIASTOLIC BLOOD PRESSURE: 77 MMHG | OXYGEN SATURATION: 98 %

## 2020-12-16 VITALS
RESPIRATION RATE: 12 BRPM | TEMPERATURE: 97 F | HEART RATE: 83 BPM | SYSTOLIC BLOOD PRESSURE: 145 MMHG | DIASTOLIC BLOOD PRESSURE: 60 MMHG | OXYGEN SATURATION: 100 %

## 2020-12-16 DIAGNOSIS — Z98.89 OTHER SPECIFIED POSTPROCEDURAL STATES: Chronic | ICD-10-CM

## 2020-12-16 DIAGNOSIS — G89.29 OTHER CHRONIC PAIN: ICD-10-CM

## 2020-12-16 DIAGNOSIS — Z98.1 ARTHRODESIS STATUS: Chronic | ICD-10-CM

## 2020-12-16 DIAGNOSIS — Z98.49 CATARACT EXTRACTION STATUS, UNSPECIFIED EYE: Chronic | ICD-10-CM

## 2020-12-16 DIAGNOSIS — M54.9 DORSALGIA, UNSPECIFIED: Chronic | ICD-10-CM

## 2020-12-16 DIAGNOSIS — Z96.89 PRESENCE OF OTHER SPECIFIED FUNCTIONAL IMPLANTS: Chronic | ICD-10-CM

## 2020-12-16 LAB — GLUCOSE BLDC GLUCOMTR-MCNC: 87 MG/DL — SIGNIFICANT CHANGE UP (ref 70–99)

## 2020-12-16 PROCEDURE — C1820: CPT

## 2020-12-16 PROCEDURE — 95972 ALYS CPLX SP/PN NPGT W/PRGRM: CPT | Mod: 59

## 2020-12-16 PROCEDURE — 63685 INS/RPLC SPI NPG/RCVR POCKET: CPT

## 2020-12-16 PROCEDURE — 82962 GLUCOSE BLOOD TEST: CPT

## 2020-12-16 PROCEDURE — C1787: CPT

## 2020-12-16 RX ORDER — SODIUM CHLORIDE 9 MG/ML
1000 INJECTION, SOLUTION INTRAVENOUS
Refills: 0 | Status: DISCONTINUED | OUTPATIENT
Start: 2020-12-16 | End: 2020-12-30

## 2020-12-16 RX ORDER — FENTANYL CITRATE 50 UG/ML
25 INJECTION INTRAVENOUS
Refills: 0 | Status: DISCONTINUED | OUTPATIENT
Start: 2020-12-16 | End: 2020-12-16

## 2020-12-16 RX ORDER — AZTREONAM 2 G
1 VIAL (EA) INJECTION
Qty: 10 | Refills: 0
Start: 2020-12-16 | End: 2020-12-20

## 2020-12-16 RX ORDER — ONDANSETRON 8 MG/1
4 TABLET, FILM COATED ORAL ONCE
Refills: 0 | Status: DISCONTINUED | OUTPATIENT
Start: 2020-12-16 | End: 2020-12-30

## 2020-12-16 RX ORDER — ACETAMINOPHEN 500 MG
1000 TABLET ORAL ONCE
Refills: 0 | Status: DISCONTINUED | OUTPATIENT
Start: 2020-12-16 | End: 2020-12-30

## 2020-12-16 RX ORDER — ACETAMINOPHEN 500 MG
650 TABLET ORAL EVERY 6 HOURS
Refills: 0 | Status: DISCONTINUED | OUTPATIENT
Start: 2020-12-16 | End: 2020-12-30

## 2020-12-16 RX ORDER — CEFAZOLIN SODIUM 1 G
2000 VIAL (EA) INJECTION ONCE
Refills: 0 | Status: COMPLETED | OUTPATIENT
Start: 2020-12-16 | End: 2020-12-16

## 2020-12-16 NOTE — ASU PATIENT PROFILE, ADULT - PMH
Anemia  h/o  Anxiety  h/o  Asthma  Allergy induced  Chronic pain disorder  h/o scoliosis  neuropathy/ lumbar radiculopathy from lumbar stenosis  1999 pain management DR Vargas  2004 /  [phone]  GERD (gastroesophageal reflux disease)    History of fever  03/2020 after returning from [place], with fatigue, head ache relieved by resque inhaler, probably was due to Flu  HTN (hypertension)    Hypothyroid    Lung nodule  2017, on follow up with Dr bree celeste, [facility] visit 11/2020, last Ct 11/02/2020 , same like last year  Migraine  h/o  Spinal stenosis    Type 2 diabetes mellitus  h/o DM last  A1C was 5.9 , currently no ton meds

## 2020-12-16 NOTE — ASU PATIENT PROFILE, ADULT - PSH
Back pain  Insertion Neurostimulator Metronic 2000   battery change   2002 , 2003, 11/06 off during day time  H/O arthroscopy of knee    S/P appendectomy  1956  S/P cervical discectomy  Anterior  with Fusion C3-C4, C4-C5, C5-C6, C6-C7 2006  S/P D&C (status post dilation and curettage)  x 2  & Cone biopsy 1996  S/P laminectomy with spinal fusion  Thorasic Posterior Arthrodesis  decompresion 11/06 2014 - Fusion of T8-T10 correction of scoliosis  2016 - L5-S1 stabilization reexploration of fusion  S/P lumbar laminectomy  Decompressive Laminectomy L-3 -4  L4--5 2003   Lumbar  Arthrodesis  posterior instrumentation 4/04  S/P lumbar laminectomy  1995  S/P tonsillectomy  1948  Spinal cord stimulator status  placed in 1999, replaced many times  Status post cataract extraction

## 2020-12-16 NOTE — ASU DISCHARGE PLAN (ADULT/PEDIATRIC) - CARE PROVIDER_API CALL
Dave Pak  NEUROSURGERY - GENERAL  [address], [address]  [place] [address]  Phone: [phone]  Fax: [phone]  Follow Up Time:

## 2020-12-16 NOTE — ASU DISCHARGE PLAN (ADULT/PEDIATRIC) - ASU DC SPECIAL INSTRUCTIONSFT
1. Remove top surgical dressing on post operative day 3. Incision should be left uncovered after day 3. Do not remove steri strips or the mesh dressing directly over the incision.  2. Begin showering on post operative day 2. Avoid long soaks and do not submerge incision in water. Regular shower only and allow soap and water to run over the incision. Pat incision area dry with clean towel- do not scrub. Please shower regularly to ensure incision stays clean to avoid post operative infections.   3. Notify your surgeon if you notice increased redness, drainage or you notice incision area opening.   4. Return to ER immediately for high fevers, severe headache, vomiting, lethargy or weakness  5. Please call your neurosurgeon following discharge to make follow up appointment in 1 week after discharge unless otherwise specified. See contact information.  6. Prescription post operative medication has been sent to the pharmacy on file.   7. Ambulate as tolerate. Continue with all "activities of daily living." Avoid strenuous activity or lifting more than 10 pounds until cleared for additional activity at your follow up appointment.  8. Do not return to work or school until cleared by your neurosurgeon at your follow up visit unless specified to you during your hospital stay  9. Do not restart your ASA or anticoagulation/ anti platelets until you follow up with your neurosurgeon, or have gotten their approval.

## 2020-12-18 PROBLEM — R91.1 SOLITARY PULMONARY NODULE: Chronic | Status: ACTIVE | Noted: 2020-12-14

## 2020-12-18 PROBLEM — Z87.898 PERSONAL HISTORY OF OTHER SPECIFIED CONDITIONS: Chronic | Status: ACTIVE | Noted: 2020-12-14

## 2020-12-23 ENCOUNTER — APPOINTMENT (OUTPATIENT)
Dept: NEUROSURGERY | Facility: CLINIC | Age: 75
End: 2020-12-23
Payer: MEDICARE

## 2020-12-23 VITALS
SYSTOLIC BLOOD PRESSURE: 135 MMHG | WEIGHT: 115 LBS | HEIGHT: 62 IN | BODY MASS INDEX: 21.16 KG/M2 | DIASTOLIC BLOOD PRESSURE: 60 MMHG | HEART RATE: 87 BPM

## 2020-12-23 VITALS — TEMPERATURE: 97.2 F

## 2020-12-23 PROCEDURE — 99024 POSTOP FOLLOW-UP VISIT: CPT

## 2020-12-23 NOTE — HISTORY OF PRESENT ILLNESS
[FreeTextEntry1] : 74 yo female arrives for initial post op visit.  She is feeling well, and the right abdominal IPG site is well approximated, steri strips intact.  She reports tenderness with trunk flexion at superior border.  She has good pain relief with current settings, except foot.

## 2020-12-23 NOTE — PHYSICAL EXAM
[General Appearance - Alert] : alert [General Appearance - In No Acute Distress] : in no acute distress [Clean] : clean [Dry] : dry [Healing Well] : healing well [Intact] : intact [No Drainage] : without drainage [Normal Skin] : normal [Oriented To Time, Place, And Person] : oriented to person, place, and time [Impaired Insight] : insight and judgment were intact [Sclera] : the sclera and conjunctiva were normal [Hearing Threshold Finger Rub Not [place]] : hearing was normal [Neck Appearance] : the appearance of the neck was normal [] : no respiratory distress [Respiration, Rhythm And Depth] : normal respiratory rhythm and effort [Edema] : there was no peripheral edema [Abnormal Walk] : normal gait [Involuntary Movements] : no involuntary movements were seen [Motor Tone] : muscle strength and tone were normal [Skin Color & Pigmentation] : normal skin color and pigmentation [Erythema] : not erythematous [Warm] : not warm

## 2020-12-23 NOTE — REASON FOR VISIT
[de-identified] : s/p 12/16/2020 replaced right abdominal MT IPG with Non recharcheable MT IPG due to end of life

## 2020-12-23 NOTE — REVIEW OF SYSTEMS
[As Noted in HPI] : as noted in HPI [Negative] : Respiratory [Vomiting] : no vomiting [Diarrhea] : no diarrhea [Dysuria] : no dysuria [Incontinence] : no incontinence [Easy Bleeding] : no tendency for easy bleeding [Easy Bruising] : no tendency for easy bruising

## 2020-12-23 NOTE — ASSESSMENT
[FreeTextEntry1] : Reviewed standard post op wound care instructions, and light activity restrictions x 6-8 weeks.  Allow healing and adjustment to new IPG location superior to right abdominal incision, and return in 6 weeks with xrays at that time.  [employer] rep present and will hold off on programming at this time, keep settings as is, due to pt complains of discomfort when IPG is on at times.  \par \par 1)  Xrays Lumbar flex/ext - 6 weeks - assess IPG \par 2)  RTO 6 weeks

## 2021-01-22 ENCOUNTER — APPOINTMENT (OUTPATIENT)
Dept: RADIOLOGY | Facility: CLINIC | Age: 76
End: 2021-01-22

## 2021-01-22 ENCOUNTER — RESULT REVIEW (OUTPATIENT)
Age: 76
End: 2021-01-22

## 2021-01-22 ENCOUNTER — OUTPATIENT (OUTPATIENT)
Dept: OUTPATIENT SERVICES | Facility: HOSPITAL | Age: 76
LOS: 1 days | End: 2021-01-22
Payer: MEDICARE

## 2021-01-22 DIAGNOSIS — Z98.89 OTHER SPECIFIED POSTPROCEDURAL STATES: Chronic | ICD-10-CM

## 2021-01-22 DIAGNOSIS — Z98.49 CATARACT EXTRACTION STATUS, UNSPECIFIED EYE: Chronic | ICD-10-CM

## 2021-01-22 DIAGNOSIS — Z96.89 PRESENCE OF OTHER SPECIFIED FUNCTIONAL IMPLANTS: ICD-10-CM

## 2021-01-22 DIAGNOSIS — Z98.1 ARTHRODESIS STATUS: Chronic | ICD-10-CM

## 2021-01-22 DIAGNOSIS — M54.9 DORSALGIA, UNSPECIFIED: Chronic | ICD-10-CM

## 2021-01-22 DIAGNOSIS — Z96.89 PRESENCE OF OTHER SPECIFIED FUNCTIONAL IMPLANTS: Chronic | ICD-10-CM

## 2021-01-22 PROCEDURE — 72110 X-RAY EXAM L-2 SPINE 4/>VWS: CPT | Mod: 26

## 2021-01-22 PROCEDURE — 72110 X-RAY EXAM L-2 SPINE 4/>VWS: CPT

## 2021-02-02 ENCOUNTER — APPOINTMENT (OUTPATIENT)
Dept: NEUROSURGERY | Facility: CLINIC | Age: 76
End: 2021-02-02
Payer: MEDICARE

## 2021-02-02 PROCEDURE — 99214 OFFICE O/P EST MOD 30 MIN: CPT | Mod: 95

## 2021-02-02 NOTE — REVIEW OF SYSTEMS
[Negative] : Respiratory [Vomiting] : no vomiting [Diarrhea] : no diarrhea [Incontinence] : no incontinence [Easy Bleeding] : no tendency for easy bleeding [Easy Bruising] : no tendency for easy bruising

## 2021-02-02 NOTE — ASSESSMENT
[FreeTextEntry1] : Due to discomfort and not receiving the benefit of pain relief as before, she is indicated for an IPG revision/ replacement.  We will implant a significantly thinner IPG which should alleviate her discomfort.  After discussion of the risks, benefits, and alternatives to IPG replacement, she wishes to proceed with this least invasive approach.\par \par 1)  Schedule SCS IPG replacement/ revision.\par [employer] - Right abdomen

## 2021-02-02 NOTE — REASON FOR VISIT
[Follow-Up: _____] : a [unfilled] follow-up visit [Home] : at home, [unfilled] , at the time of the visit. [Medical Office: ([facility])___] : at the medical office located in  [Verbal consent obtained from patient] : the patient, [unfilled] [FreeTextEntry1] : pt c/o pain with trunk flexion\par 1/22/2021 Xrays flex/ext for review - PACS/ report below

## 2021-02-02 NOTE — HISTORY OF PRESENT ILLNESS
[FreeTextEntry1] : Mrs. Kenney is a 74 yo woman with PHM of HTN, DM2, asthma, GERD, COPD, multiple spinal surgeries, chronic back pain, s/p SCS (Dr. Almonte), placed many years ago, s/p 10/11/2017 SCS IPG replaced (MT), s/p 12/16/2020 SCS IPG replaced again due to end of life with MT Non rechargeable.  She complains of discomfort when the battery is on too long due to intense sensation and discomfort with forward trunk flexion.  She is not receiving the benefit of pain relief as before, when the SCS is off due to non tolerance.  \par \par History:  \par Initial IPG in buttock area, which moved.  Abdominal IPG implanted superior to explant when replaced due to complaints of discomfort in previous area.\par \par She denies nausea, vomiting, fever, night sweats, chest pain, palpitations, shortness of breath, headache, weakness, visual, hearing, or sp

## 2021-02-02 NOTE — RESULTS/DATA
[FreeTextEntry1] : [facility]\par    [facility] [place] [facility] Department of Radiology\par   Radiology Report\par \par \par Patient Name: ANNE BLEDSOE  Report Date: 22-Jan-2021 15:14.00 \par Patient ID: [id number] (00), [id number] (EPI)  Accession No.: [id number] \par Patient Birth Date: 1945  Report Status: F \par Referring Physician: [id number] MANIATIS SONJA FARHAN   Reason For Study: PAIN  \par \par \par \par \par \par \par \par \par EXAM: SPINE LUMBOSAC MIN 4 V W OBLIQ\par \par \par PROCEDURE DATE: 01/22/2021\par \par \par \par INTERPRETATION: CLINICAL INDICATION: battery discomfort; back pain; prior spine surgery; follow-up\par \par EXAM:\par AP; neutral flexion extension lateral and bilateral oblique lumbosacral spine from 1/22/2021 at 1407. Reviewed in conjunction with L-spine CT from 6/1/2017.\par \par IMPRESSION:\par Unchanged configuration and position of previously seen posterior spinal fusion hardware and lower thoracic level lateral stabilization hardware.\par \par Spinal cord stimulator present with pulse generator overlying right lower quadrant and electrode wiring extending towards spinal canal and coursing cephalad beyond imaged field-of-view.\par \par L4 compression deformity again noted. Remaining vertebral body heights maintained. No spondylolistheses or evidence for dynamic instability.\par \par Multilevel degenerative disc changes most severe at lower lumbar levels.\par \par Unremarkable SI joints.\par \par Atherosclerotic abdominal aortic calcifications.\par \par Generalized osteopenia otherwise no discrete lytic or blastic lesions. Atherosclerotic abdominal aortic calcifications.\par \par \par \par \par \par \par GENEVIEVE BUI M.D., ATTENDING RADIOLOGIST\par This document has been electronically signed. Jan 22 2021 3:14PM\par \par  \par

## 2021-03-02 ENCOUNTER — OUTPATIENT (OUTPATIENT)
Dept: OUTPATIENT SERVICES | Facility: HOSPITAL | Age: 76
LOS: 1 days | End: 2021-03-02
Payer: MEDICARE

## 2021-03-02 VITALS
TEMPERATURE: 99 F | SYSTOLIC BLOOD PRESSURE: 151 MMHG | WEIGHT: 115.96 LBS | OXYGEN SATURATION: 98 % | DIASTOLIC BLOOD PRESSURE: 66 MMHG | HEART RATE: 95 BPM | RESPIRATION RATE: 14 BRPM | HEIGHT: 62 IN

## 2021-03-02 DIAGNOSIS — G89.29 OTHER CHRONIC PAIN: ICD-10-CM

## 2021-03-02 DIAGNOSIS — Z98.890 OTHER SPECIFIED POSTPROCEDURAL STATES: Chronic | ICD-10-CM

## 2021-03-02 DIAGNOSIS — M54.9 DORSALGIA, UNSPECIFIED: Chronic | ICD-10-CM

## 2021-03-02 DIAGNOSIS — Z98.89 OTHER SPECIFIED POSTPROCEDURAL STATES: Chronic | ICD-10-CM

## 2021-03-02 DIAGNOSIS — Z01.818 ENCOUNTER FOR OTHER PREPROCEDURAL EXAMINATION: ICD-10-CM

## 2021-03-02 DIAGNOSIS — Z96.89 PRESENCE OF OTHER SPECIFIED FUNCTIONAL IMPLANTS: Chronic | ICD-10-CM

## 2021-03-02 DIAGNOSIS — Z98.1 ARTHRODESIS STATUS: Chronic | ICD-10-CM

## 2021-03-02 DIAGNOSIS — Z98.49 CATARACT EXTRACTION STATUS, UNSPECIFIED EYE: Chronic | ICD-10-CM

## 2021-03-02 LAB
ANION GAP SERPL CALC-SCNC: 13 MMOL/L — SIGNIFICANT CHANGE UP (ref 5–17)
BUN SERPL-MCNC: 29 MG/DL — HIGH (ref 7–23)
CALCIUM SERPL-MCNC: 10.1 MG/DL — SIGNIFICANT CHANGE UP (ref 8.4–10.5)
CHLORIDE SERPL-SCNC: 100 MMOL/L — SIGNIFICANT CHANGE UP (ref 96–108)
CO2 SERPL-SCNC: 20 MMOL/L — LOW (ref 22–31)
CREAT SERPL-MCNC: 0.8 MG/DL — SIGNIFICANT CHANGE UP (ref 0.5–1.3)
GLUCOSE SERPL-MCNC: 99 MG/DL — SIGNIFICANT CHANGE UP (ref 70–99)
HCT VFR BLD CALC: 33.5 % — LOW (ref 34.5–45)
HGB BLD-MCNC: 11.1 G/DL — LOW (ref 11.5–15.5)
MCHC RBC-ENTMCNC: 31 PG — SIGNIFICANT CHANGE UP (ref 27–34)
MCHC RBC-ENTMCNC: 33.1 GM/DL — SIGNIFICANT CHANGE UP (ref 32–36)
MCV RBC AUTO: 93.6 FL — SIGNIFICANT CHANGE UP (ref 80–100)
NRBC # BLD: 0 /100 WBCS — SIGNIFICANT CHANGE UP (ref 0–0)
PLATELET # BLD AUTO: 274 K/UL — SIGNIFICANT CHANGE UP (ref 150–400)
POTASSIUM SERPL-MCNC: 4.1 MMOL/L — SIGNIFICANT CHANGE UP (ref 3.5–5.3)
POTASSIUM SERPL-SCNC: 4.1 MMOL/L — SIGNIFICANT CHANGE UP (ref 3.5–5.3)
RBC # BLD: 3.58 M/UL — LOW (ref 3.8–5.2)
RBC # FLD: 12.9 % — SIGNIFICANT CHANGE UP (ref 10.3–14.5)
SODIUM SERPL-SCNC: 133 MMOL/L — LOW (ref 135–145)
WBC # BLD: 6.23 K/UL — SIGNIFICANT CHANGE UP (ref 3.8–10.5)
WBC # FLD AUTO: 6.23 K/UL — SIGNIFICANT CHANGE UP (ref 3.8–10.5)

## 2021-03-02 PROCEDURE — 87640 STAPH A DNA AMP PROBE: CPT

## 2021-03-02 PROCEDURE — 83036 HEMOGLOBIN GLYCOSYLATED A1C: CPT

## 2021-03-02 PROCEDURE — 80048 BASIC METABOLIC PNL TOTAL CA: CPT

## 2021-03-02 PROCEDURE — 87641 MR-STAPH DNA AMP PROBE: CPT

## 2021-03-02 PROCEDURE — 85027 COMPLETE CBC AUTOMATED: CPT

## 2021-03-02 PROCEDURE — G0463: CPT

## 2021-03-02 RX ORDER — VITAMIN E 100 UNIT
1 CAPSULE ORAL
Qty: 0 | Refills: 0 | DISCHARGE

## 2021-03-02 RX ORDER — FAMOTIDINE 10 MG/ML
1 INJECTION INTRAVENOUS
Qty: 0 | Refills: 0 | DISCHARGE

## 2021-03-02 RX ORDER — SODIUM CHLORIDE 9 MG/ML
3 INJECTION INTRAMUSCULAR; INTRAVENOUS; SUBCUTANEOUS EVERY 8 HOURS
Refills: 0 | Status: DISCONTINUED | OUTPATIENT
Start: 2021-03-08 | End: 2021-03-23

## 2021-03-02 RX ORDER — CHLORHEXIDINE GLUCONATE 213 G/1000ML
1 SOLUTION TOPICAL ONCE
Refills: 0 | Status: COMPLETED | OUTPATIENT
Start: 2021-03-08 | End: 2021-03-08

## 2021-03-02 RX ORDER — LIDOCAINE HCL 20 MG/ML
0.2 VIAL (ML) INJECTION ONCE
Refills: 0 | Status: DISCONTINUED | OUTPATIENT
Start: 2021-03-08 | End: 2021-03-23

## 2021-03-02 NOTE — H&P PST ADULT - HISTORY OF PRESENT ILLNESS
Mr. 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    Denies s/s of Covid no fever, chills, headaches, body aches, coughing, loss of taste or smell    COVID testing scheduled for 3/5/2021

## 2021-03-02 NOTE — H&P PST ADULT - ALLERGY TYPES
dog dander/outdoor environmental allergies/indoor environmental allergies/reactions to medicines/reactions to animals

## 2021-03-02 NOTE — H&P PST ADULT - ENMT COMMENTS
diminished hearing in right ear, unable to hear finger rub, can lightly hear rub with left year, no redness/inflammation of mouth/pharynx

## 2021-03-02 NOTE — H&P PST ADULT - NSICDXPASTMEDICALHX_GEN_ALL_CORE_FT
PAST MEDICAL HISTORY:  Anemia h/o    Anxiety h/o    Asthma Allergy induced    Chronic pain disorder h/o scoliosis  neuropathy/ lumbar radiculopathy from lumbar stenosis  1999 pain management DR Vargas  2004 /  [id number]    GERD (gastroesophageal reflux disease)     History of fever 03/2020 after returning from [place], with fatigue, head ache relieved by resque inhaler, probably was due to Flu    HTN (hypertension)     Hypothyroid     Lung nodule 2017, on follow up with Dr bree celeste, [facility] visit 11/2020, last Ct 11/02/2020 , same like last year    Migraine h/o    Spinal stenosis     Type 2 diabetes mellitus h/o DM last  A1C was 5.9 , currently no ton meds

## 2021-03-02 NOTE — H&P PST ADULT - PRIMARY CARE PROVIDER
Dr. Shah, PCP, [phone]; Chance Robertson, pulm, [phone]; Vinay Sosa, pain management, [phone]

## 2021-03-02 NOTE — H&P PST ADULT - NSICDXPROBLEM_GEN_ALL_CORE_FT
PROBLEM DIAGNOSES  Problem: Other chronic pain  Assessment and Plan: Scheduled for replacement of spinal cord stimulator pulse generator  Chlorhexidine to affected area.  CBC, BMP, A1c and MRSA/MSSA swab pending results.  Medical evaluation in chart.  Last saw Pulm 2/25/21, last saw Pain management last week as well  COVID testing 3/5/21  Preop instructions given

## 2021-03-02 NOTE — H&P PST ADULT - HEALTH CARE MAINTENANCE
Flu vaccine 2020  Pneumonia vaccine had first, due for second  No shingles vaccine  Moderna COVID vaccine #1 2/12/21 scheduled for 2nd vaccine 3/12/21  Mammo 2018

## 2021-03-02 NOTE — H&P PST ADULT - NSICDXPASTSURGICALHX_GEN_ALL_CORE_FT
PAST SURGICAL HISTORY:  Back pain Insertion Neurostimulator Metronic 2000   battery change   2002 , 2003, 11/06 off during day time    H/O arthroscopy of knee     H/O cone biopsy of cervix 1996    S/P appendectomy 1956    S/P cervical discectomy Anterior  with Fusion C3-C4, C4-C5, C5-C6, C6-C7 2006    S/P D&C (status post dilation and curettage) x 2  & Cone biopsy 1996    S/P laminectomy with spinal fusion Thorasic Posterior Arthrodesis  decompresion 11/06 2014 - Fusion of T8-T10 correction of scoliosis  2016 - L5-S1 stabilization reexploration of fusion    S/P lumbar laminectomy 1995    S/P lumbar laminectomy Decompressive Laminectomy L-3 -4  L4--5 2003   Lumbar  Arthrodesis  posterior instrumentation 4/04    S/P tonsillectomy 1948    Spinal cord stimulator status placed in 1999, replaced many times, new one 12/16/2020    Status post cataract extraction

## 2021-03-02 NOTE — H&P PST ADULT - ACTIVITY
Limited due to left leg pain, can climb a flight of stairs, can walk 2 blocks, walked from lobby to here, can carry 10lbs

## 2021-03-03 LAB
A1C WITH ESTIMATED AVERAGE GLUCOSE RESULT: 5.8 % — HIGH (ref 4–5.6)
ESTIMATED AVERAGE GLUCOSE: 120 MG/DL — HIGH (ref 68–114)
MRSA PCR RESULT.: SIGNIFICANT CHANGE UP
S AUREUS DNA NOSE QL NAA+PROBE: DETECTED

## 2021-03-05 ENCOUNTER — OUTPATIENT (OUTPATIENT)
Dept: OUTPATIENT SERVICES | Facility: HOSPITAL | Age: 76
LOS: 1 days | End: 2021-03-05
Payer: MEDICARE

## 2021-03-05 DIAGNOSIS — Z98.1 ARTHRODESIS STATUS: Chronic | ICD-10-CM

## 2021-03-05 DIAGNOSIS — M54.9 DORSALGIA, UNSPECIFIED: Chronic | ICD-10-CM

## 2021-03-05 DIAGNOSIS — Z11.52 ENCOUNTER FOR SCREENING FOR COVID-19: ICD-10-CM

## 2021-03-05 DIAGNOSIS — Z96.89 PRESENCE OF OTHER SPECIFIED FUNCTIONAL IMPLANTS: Chronic | ICD-10-CM

## 2021-03-05 DIAGNOSIS — Z98.89 OTHER SPECIFIED POSTPROCEDURAL STATES: Chronic | ICD-10-CM

## 2021-03-05 DIAGNOSIS — Z98.49 CATARACT EXTRACTION STATUS, UNSPECIFIED EYE: Chronic | ICD-10-CM

## 2021-03-05 DIAGNOSIS — Z98.890 OTHER SPECIFIED POSTPROCEDURAL STATES: Chronic | ICD-10-CM

## 2021-03-05 LAB — SARS-COV-2 RNA SPEC QL NAA+PROBE: SIGNIFICANT CHANGE UP

## 2021-03-05 PROCEDURE — U0005: CPT

## 2021-03-05 PROCEDURE — U0003: CPT

## 2021-03-05 PROCEDURE — C9803: CPT

## 2021-03-07 ENCOUNTER — TRANSCRIPTION ENCOUNTER (OUTPATIENT)
Age: 76
End: 2021-03-07

## 2021-03-08 ENCOUNTER — APPOINTMENT (OUTPATIENT)
Dept: NEUROSURGERY | Facility: HOSPITAL | Age: 76
End: 2021-03-08

## 2021-03-08 ENCOUNTER — OUTPATIENT (OUTPATIENT)
Dept: OUTPATIENT SERVICES | Facility: HOSPITAL | Age: 76
LOS: 1 days | End: 2021-03-08
Payer: MEDICARE

## 2021-03-08 VITALS
OXYGEN SATURATION: 98 % | HEIGHT: 62 IN | RESPIRATION RATE: 16 BRPM | DIASTOLIC BLOOD PRESSURE: 60 MMHG | TEMPERATURE: 98 F | HEART RATE: 91 BPM | SYSTOLIC BLOOD PRESSURE: 160 MMHG | WEIGHT: 115.96 LBS

## 2021-03-08 VITALS
RESPIRATION RATE: 16 BRPM | DIASTOLIC BLOOD PRESSURE: 65 MMHG | SYSTOLIC BLOOD PRESSURE: 142 MMHG | TEMPERATURE: 98 F | HEART RATE: 84 BPM | OXYGEN SATURATION: 99 %

## 2021-03-08 DIAGNOSIS — M54.9 DORSALGIA, UNSPECIFIED: Chronic | ICD-10-CM

## 2021-03-08 DIAGNOSIS — Z98.89 OTHER SPECIFIED POSTPROCEDURAL STATES: Chronic | ICD-10-CM

## 2021-03-08 DIAGNOSIS — Z96.89 PRESENCE OF OTHER SPECIFIED FUNCTIONAL IMPLANTS: Chronic | ICD-10-CM

## 2021-03-08 DIAGNOSIS — G89.29 OTHER CHRONIC PAIN: ICD-10-CM

## 2021-03-08 DIAGNOSIS — Z98.49 CATARACT EXTRACTION STATUS, UNSPECIFIED EYE: Chronic | ICD-10-CM

## 2021-03-08 DIAGNOSIS — Z98.890 OTHER SPECIFIED POSTPROCEDURAL STATES: Chronic | ICD-10-CM

## 2021-03-08 DIAGNOSIS — Z98.1 ARTHRODESIS STATUS: Chronic | ICD-10-CM

## 2021-03-08 LAB — GLUCOSE BLDC GLUCOMTR-MCNC: 109 MG/DL — HIGH (ref 70–99)

## 2021-03-08 PROCEDURE — C1820: CPT

## 2021-03-08 PROCEDURE — 63685 INS/RPLC SPI NPG/RCVR POCKET: CPT

## 2021-03-08 PROCEDURE — 82962 GLUCOSE BLOOD TEST: CPT

## 2021-03-08 PROCEDURE — C1787: CPT

## 2021-03-08 PROCEDURE — C1889: CPT

## 2021-03-08 RX ORDER — OXYCODONE HYDROCHLORIDE 5 MG/1
5 TABLET ORAL
Qty: 12 | Refills: 0
Start: 2021-03-08 | End: 2021-03-10

## 2021-03-08 RX ORDER — SODIUM CHLORIDE 9 MG/ML
1000 INJECTION, SOLUTION INTRAVENOUS
Refills: 0 | Status: DISCONTINUED | OUTPATIENT
Start: 2021-03-08 | End: 2021-03-23

## 2021-03-08 RX ORDER — ONDANSETRON 8 MG/1
4 TABLET, FILM COATED ORAL ONCE
Refills: 0 | Status: DISCONTINUED | OUTPATIENT
Start: 2021-03-08 | End: 2021-03-23

## 2021-03-08 RX ORDER — CEFAZOLIN SODIUM 1 G
2000 VIAL (EA) INJECTION ONCE
Refills: 0 | Status: COMPLETED | OUTPATIENT
Start: 2021-03-08 | End: 2021-03-08

## 2021-03-08 RX ORDER — AZTREONAM 2 G
800 VIAL (EA) INJECTION
Qty: 8000 | Refills: 0
Start: 2021-03-08 | End: 2021-03-12

## 2021-03-08 RX ORDER — OXYCODONE HYDROCHLORIDE 5 MG/1
1 TABLET ORAL
Qty: 12 | Refills: 0
Start: 2021-03-08 | End: 2021-03-10

## 2021-03-08 RX ADMIN — CHLORHEXIDINE GLUCONATE 1 APPLICATION(S): 213 SOLUTION TOPICAL at 14:19

## 2021-03-08 NOTE — ASU DISCHARGE PLAN (ADULT/PEDIATRIC) - CARE PROVIDER_API CALL
Dave Pak)  Neurosurgery  General  [address], [address]  [place] [address]  Phone: [phone]  Fax: [phone]  Follow Up Time:

## 2021-03-08 NOTE — ASU PATIENT PROFILE, ADULT - PSH
Back pain  Insertion Neurostimulator Metronic 2000   battery change   2002 , 2003, 11/06 off during day time  H/O arthroscopy of knee    H/O cone biopsy of cervix  1996  S/P appendectomy  1956  S/P cervical discectomy  Anterior  with Fusion C3-C4, C4-C5, C5-C6, C6-C7 2006  S/P D&C (status post dilation and curettage)  x 2  & Cone biopsy 1996  S/P laminectomy with spinal fusion  Thorasic Posterior Arthrodesis  decompresion 11/06 2014 - Fusion of T8-T10 correction of scoliosis  2016 - L5-S1 stabilization reexploration of fusion  S/P lumbar laminectomy  Decompressive Laminectomy L-3 -4  L4--5 2003   Lumbar  Arthrodesis  posterior instrumentation 4/04  S/P lumbar laminectomy  1995  S/P tonsillectomy  1948  Spinal cord stimulator status  placed in 1999, replaced many times, new one 12/16/2020  Status post cataract extraction

## 2021-03-08 NOTE — ASU DISCHARGE PLAN (ADULT/PEDIATRIC) - ASU DC SPECIAL INSTRUCTIONSFT
Do not remove the dressing. Please take bactrim DS 800mg 2x daily for 5 days. Tylenol for pain control as needed. No strenuous exercise, regular activities as tolerated. No showering for 2 days, no submerging in water 2 weeks at least. Contact the office with any concerns.

## 2021-03-08 NOTE — ASU PATIENT PROFILE, ADULT - PMH
Anemia  h/o  Anxiety  h/o  Asthma  Allergy induced  Chronic pain disorder  h/o scoliosis  neuropathy/ lumbar radiculopathy from lumbar stenosis  1999 pain management DR Vargas  2004 /  [phone]  GERD (gastroesophageal reflux disease)    History of fever  03/2020 after returning from [place], with fatigue, head ache relieved by resque inhaler, probably was due to Flu  HTN (hypertension)    Hypothyroid    Lung nodule  2017, on follow up with Dr bree celeste, [facility] visit 11/2020, last Ct 11/02/2020 , same like last year  Migraine  h/o  Spinal stenosis    Type 2 diabetes mellitus  h/o DM last  A1C was 5.9 , currently no ton meds

## 2021-03-08 NOTE — PRE-ANESTHESIA EVALUATION ADULT - NSANTHPMHFT_GEN_ALL_CORE
anemia, Hb 11.1  lung ground glass opacities stable  allergy induced asthma  thyroid function stable  no heart issues

## 2021-03-16 ENCOUNTER — APPOINTMENT (OUTPATIENT)
Dept: NEUROSURGERY | Facility: CLINIC | Age: 76
End: 2021-03-16
Payer: MEDICARE

## 2021-03-16 DIAGNOSIS — Z87.891 PERSONAL HISTORY OF NICOTINE DEPENDENCE: ICD-10-CM

## 2021-03-23 ENCOUNTER — APPOINTMENT (OUTPATIENT)
Dept: NEUROSURGERY | Facility: CLINIC | Age: 76
End: 2021-03-23
Payer: MEDICARE

## 2021-03-23 VITALS
HEART RATE: 84 BPM | HEIGHT: 62 IN | BODY MASS INDEX: 21.16 KG/M2 | WEIGHT: 115 LBS | DIASTOLIC BLOOD PRESSURE: 65 MMHG | SYSTOLIC BLOOD PRESSURE: 157 MMHG

## 2021-03-23 VITALS — TEMPERATURE: 95.1 F

## 2021-03-23 DIAGNOSIS — Z96.89 PRESENCE OF OTHER SPECIFIED FUNCTIONAL IMPLANTS: ICD-10-CM

## 2021-03-23 DIAGNOSIS — Z46.9 ENCOUNTER FOR FITTING AND ADJUSTMENT OF UNSPECIFIED DEVICE: ICD-10-CM

## 2021-03-23 DIAGNOSIS — Z48.89 ENCOUNTER FOR OTHER SPECIFIED SURGICAL AFTERCARE: ICD-10-CM

## 2021-03-23 PROCEDURE — 99213 OFFICE O/P EST LOW 20 MIN: CPT

## 2021-03-23 PROCEDURE — 95972 ALYS CPLX SP/PN NPGT W/PRGRM: CPT

## 2021-03-24 PROBLEM — Z96.89 S/P INSERTION OF SPINAL CORD STIMULATOR: Status: ACTIVE | Noted: 2020-12-23

## 2021-03-24 PROBLEM — Z46.9 DEVICE FITTING OR ADJUSTMENT: Status: ACTIVE | Noted: 2021-03-24

## 2021-03-24 PROBLEM — Z48.89 ENCOUNTER FOR POSTOPERATIVE WOUND CHECK: Status: ACTIVE | Noted: 2020-12-23

## 2021-03-26 NOTE — PHYSICAL EXAM
[General Appearance - Alert] : alert [General Appearance - In No Acute Distress] : in no acute distress [Clean] : clean [Dry] : dry [Well-Healed] : well-healed [Intact] : intact [No Drainage] : without drainage [Normal Skin] : normal [Oriented To Time, Place, And Person] : oriented to person, place, and time [Impaired Insight] : insight and judgment were intact [Normal] : normal [Able to toe walk] : the patient was able to toe walk [Able to heel walk] : the patient was able to heel walk [Sclera] : the sclera and conjunctiva were normal [PERRL With Normal Accommodation] : pupils were equal in size, round, reactive to light, with normal accommodation [Hearing Threshold Finger Rub Not [place]] : hearing was normal [Neck Appearance] : the appearance of the neck was normal [] : no respiratory distress [Respiration, Rhythm And Depth] : normal respiratory rhythm and effort [Edema] : there was no peripheral edema [Abnormal Walk] : normal gait [Involuntary Movements] : no involuntary movements were seen [Motor Tone] : muscle strength and tone were normal [Skin Color & Pigmentation] : normal skin color and pigmentation [Erythema] : not erythematous [Warm] : not warm [Limited Balance] : balance was intact [Tremor] : no tremor present

## 2021-03-26 NOTE — ASSESSMENT
[FreeTextEntry1] : Incisions all well healed, no signs of irritation, erythema, edema, warmth, or drainage.  Reviewed standard post op wound care instructions and light activity for up to 12 weeks post op.\par SCS activated with   [employer] rep and programming completed including PW, Amp, frequency, duration.\par She has scheduled f/u with pain management and we will reassess in 1 year.\par \par 1)  RTO 1 year\par \par

## 2021-03-26 NOTE — REVIEW OF SYSTEMS
[As Noted in HPI] : as noted in HPI [Negative] : Endocrine [Vomiting] : no vomiting [Diarrhea] : no diarrhea [Incontinence] : no incontinence [Skin Lesions] : no skin lesions [Easy Bleeding] : no tendency for easy bleeding [Easy Bruising] : no tendency for easy bruising

## 2021-03-26 NOTE — HISTORY OF PRESENT ILLNESS
[FreeTextEntry1] : Mrs. Kenney is a 74 yo woman with PHM of HTN, DM2, asthma, GERD, COPD, multiple spinal surgeries, chronic back pain, s/p SCS (Dr. Almonte), placed many years ago, s/p 10/11/2017 SCS IPG replaced (MT), s/p 12/16/2020 SCS IPG replaced again due to end of life with MT Non rechargeable.  \par \par TODAY, she arrives s/p 3/8/21 revision of SCS IPG due to discomfort and relocated lower in abdomen with smaller IPG ([employer])\par \par 2/2/21: She complains of discomfort when the battery is on too long due to intense sensation and discomfort with forward trunk flexion.  She is not receiving the benefit of pain relief as before, when the SCS is off due to non tolerance.  \par \par History:  \par Initial IPG in buttock area, which moved.  Abdominal IPG implanted superior to explant when replaced due to complaints of discomfort in previous area.\par \par She denies nausea, vomiting, fever, night sweats, chest pain, palpitations, shortness of breath, headache, weakness, visual, hearing, or speech disturbances

## 2021-04-30 ENCOUNTER — APPOINTMENT (OUTPATIENT)
Dept: DISASTER EMERGENCY | Facility: CLINIC | Age: 76
End: 2021-04-30

## 2021-04-30 ENCOUNTER — LABORATORY RESULT (OUTPATIENT)
Age: 76
End: 2021-04-30

## 2021-05-02 ENCOUNTER — TRANSCRIPTION ENCOUNTER (OUTPATIENT)
Age: 76
End: 2021-05-02

## 2021-05-04 ENCOUNTER — APPOINTMENT (OUTPATIENT)
Dept: PULMONOLOGY | Facility: CLINIC | Age: 76
End: 2021-05-04
Payer: MEDICARE

## 2021-05-04 VITALS
SYSTOLIC BLOOD PRESSURE: 130 MMHG | BODY MASS INDEX: 21.71 KG/M2 | DIASTOLIC BLOOD PRESSURE: 60 MMHG | HEIGHT: 61 IN | TEMPERATURE: 97.8 F | RESPIRATION RATE: 16 BRPM | HEART RATE: 80 BPM | OXYGEN SATURATION: 96 % | WEIGHT: 115 LBS

## 2021-05-04 PROCEDURE — ZZZZZ: CPT

## 2021-05-04 PROCEDURE — 95012 NITRIC OXIDE EXP GAS DETER: CPT

## 2021-05-04 PROCEDURE — 94010 BREATHING CAPACITY TEST: CPT

## 2021-05-04 PROCEDURE — 94729 DIFFUSING CAPACITY: CPT

## 2021-05-04 PROCEDURE — 99214 OFFICE O/P EST MOD 30 MIN: CPT | Mod: 25

## 2021-05-04 PROCEDURE — 94727 GAS DIL/WSHOT DETER LNG VOL: CPT

## 2021-05-04 RX ORDER — SULFAMETHOXAZOLE AND TRIMETHOPRIM 800; 160 MG/1; MG/1
800-160 TABLET ORAL
Qty: 10 | Refills: 0 | Status: DISCONTINUED | COMMUNITY
Start: 2021-03-08

## 2021-05-04 RX ORDER — LOSARTAN POTASSIUM AND HYDROCHLOROTHIAZIDE 12.5; 1 MG/1; MG/1
100-12.5 TABLET ORAL
Qty: 90 | Refills: 0 | Status: ACTIVE | COMMUNITY
Start: 2021-03-25

## 2021-05-04 RX ORDER — FAMOTIDINE 20 MG/1
20 TABLET, FILM COATED ORAL
Qty: 360 | Refills: 0 | Status: ACTIVE | COMMUNITY
Start: 2021-02-03

## 2021-05-04 NOTE — REVIEW OF SYSTEMS
[Negative] : Endocrine [GERD] : gerd [Chest Discomfort] : no chest discomfort [Diarrhea] : no diarrhea [Constipation] : no constipation [Dysphagia] : no dysphagia [Dizziness] : no dizziness

## 2021-05-04 NOTE — ADDENDUM
[FreeTextEntry1] : Documented by Cassandra Noonan acting as a scribe for Dr. Chance Robertson on (05/04/2021).\par \par All medical record entries made by the Scribe were at my, Dr. Chance Robertson's, direction and personally dictated by me on (05/04/2021). I have reviewed the chart and agree that the record accurately reflects my personal performance of the history, physical exam, assessment and plan. I have also personally directed, reviewed, and agree with the discharge instructions. \par \par \par  \par \par \par \par

## 2021-05-04 NOTE — PROCEDURE
[FreeTextEntry1] : Full PFT revealed normal flows, with a FEV1 of 2.54L, which is 132% of predicted, normal lung volumes, and a normal diffusion of 13.1, which is 84% of predicted, with a normal flow volume loop \par \par FENO was 19; a normal value being less than 25\par Fractional exhaled nitric oxide (FENO) is regarded as a simple, noninvasive method for assessing eosinophilic airway inflammation. Produced by a variety of cells within the lung, nitric oxide (NO) concentrations are generally low in healthy individuals. However, high concentrations of NO appear to be involved in nonspecific host defense mechanisms and chronic inflammatory diseases such as asthma. The American Thoracic Society (ATS) therefore has recommended using FENO to aid in the diagnosis and monitoring of eosinophilic airway inflammation and asthma, and for identifying steroid responsive individuals whose chronic respiratory symptoms may be caused by airway inflammation. \par \par Chest CT (11/2/2020) reveals difficult to measure irregular groundglass opacity within the posterior right upper lobe is unchanged. Recommend continued follow-ups.\par \par -Images, blood work, and procedures reviewed in detail and discussed with patient in office.

## 2021-05-04 NOTE — PHYSICAL EXAM
[General Appearance - Well Developed] : well developed [Normal Appearance] : normal appearance [Well Groomed] : well groomed [General Appearance - Well Nourished] : well nourished [No Deformities] : no deformities [General Appearance - In No Acute Distress] : no acute distress [Normal Conjunctiva] : the conjunctiva exhibited no abnormalities [Eyelids - No Xanthelasma] : the eyelids demonstrated no xanthelasmas [Normal Oropharynx] : normal oropharynx [II] : II [Neck Appearance] : the appearance of the neck was normal [Neck Cervical Mass (___cm)] : no neck mass was observed [Jugular Venous Distention Increased] : there was no jugular-venous distention [Thyroid Diffuse Enlargement] : the thyroid was not enlarged [Thyroid Nodule] : there were no palpable thyroid nodules [Heart Rate And Rhythm] : heart rate and rhythm were normal [Heart Sounds] : normal S1 and S2 [Murmurs] : no murmurs present [Respiration, Rhythm And Depth] : normal respiratory rhythm and effort [Auscultation Breath Sounds / Voice Sounds] : lungs were clear to auscultation bilaterally [Exaggerated Use Of Accessory Muscles For Inspiration] : no accessory muscle use [Abdomen Soft] : soft [Abdomen Tenderness] : non-tender [Abdomen Mass (___ Cm)] : no abdominal mass palpated [Abnormal Walk] : normal gait [Gait - Sufficient For Exercise Testing] : the gait was sufficient for exercise testing [Nail Clubbing] : no clubbing of the fingernails [Cyanosis, Localized] : no localized cyanosis [Petechial Hemorrhages (___cm)] : no petechial hemorrhages [Skin Color & Pigmentation] : normal skin color and pigmentation [] : no rash [No Venous Stasis] : no venous stasis [Skin Lesions] : no skin lesions [No Skin Ulcers] : no skin ulcer [No Xanthoma] : no  xanthoma was observed [Deep Tendon Reflexes (DTR)] : deep tendon reflexes were 2+ and symmetric [Sensation] : the sensory exam was normal to light touch and pinprick [No Focal Deficits] : no focal deficits [Oriented To Time, Place, And Person] : oriented to person, place, and time [Impaired Insight] : insight and judgment were intact [Affect] : the affect was normal [FreeTextEntry1] : I:E 1:3, mild expiratory wheeze

## 2021-05-04 NOTE — HISTORY OF PRESENT ILLNESS
[FreeTextEntry1] : Ms. Kenney is a 75 year old female presenting to the office for a follow up visit for abnormal chest CT, reflux, allergic rhinitis, asthma, bronchiectasis, chronic rhinitis, lung nodules and shortness of breath. Her chief complaint is reflux\par -she reports feeling generally well, but having issues with the pandemic\par -she notes she has returned to PT\par -she reports she had IBS symptoms this week\par -she reports she has active reflux / hb, and takes Prevacid\par -she notes she has been off DM medication for 2 years\par -she reports her weight is stable\par -she states she added a diuretic in addition to amlodipine\par -she reports her sleep has improved\par -she states she uses Allegra for allergies, and an eye drop\par - denies any chest pain, chest pressure, diarrhea, constipation, dysphagia, dizziness, leg swelling, leg pain, itchy eyes, itchy ears, or sour taste in the mouth.

## 2021-05-04 NOTE — ASSESSMENT
[FreeTextEntry1] : Ms. Kenney is a 75 year old female who has a history of DM, HLD, HTN, hypothyroid, IBS, OA, asthma, allergy, and abnormal CT scan, bronchiectasis, GERD, and orthopedic issues. She is currently stable from a pulmonary perspective - mildly mucusy\par \par Problem 1: Bronchiectasis\par - Recommended Acapella device.\par Seen on the CT of the chest or chest x-ray signifies damaged bronchial tubes focal or diffuse which can be sites of recurrent infections. These areas can be colonized by various organisms including bacteria (hemophilous influenzae/Pseudomonas species etc.) as well as acid fast bacilli (mycobacterial disease- inclusive of TB/MACI etc.). Sputum either for bacteria culture/sensitivity or AFB culture and sensitivity will need to be sent if the patient has sputum- 3 specimens on consecutive days will need to be dropped at the laboratory- if the patient can produce sputum.\par \par problem 2: asthma (active)- likely\par -continue to use Breo Ellipta 200 at 1 inhalation QD (Noncompliant- needs to improve)\par -continue Ventolin as a rescue inhaler 2 inhalations pre-exercise \par -Asthma is  believed to be caused by inherited (genetic) and environmental factor, but its exact cause is unknown. Asthma may be triggered by allergens, lung infections, or irritants in the air. Asthma triggers are different for each person\par -Inhaler technique reviewed as well as oral hygiene techniques reviewed with patient. Avoidance of cold air, extremes of temperature, rescue inhaler should be used before exercise. Order of medication reviewed with patient. Recommended use of a cool mist humidifier in the bedroom.\par \par problem 3: allergic rhinitis - #1 issue\par -continue to use Flonase 1 sniff/nostril BID\par -continue to use Astelin 0.15 1 sniff/nostril BID\par -continue to use Clarinex 5 mg QHS \par - Planning on beginning Allegra OTC QAM\par - Recommended the Navage \par -Environmental measures for allergies were encouraged including mattress and pillow cover, air purifier, and \par \par problem 3: abnormal chest CT, ? early cancer (improved- ?inflammation)\par -follow up chest CT in 11/2021\par -Differential diagnosis include: (minimally invasive) cancer, BOOP, or hypersensitivity pneumonitis\par -recommended thoracic surgical evaluation to determine treatment environmental controls.\par \par problem 4: GERD\par -Add Pepcid 40 mg QHS \par -Rule of 2s: avoid eating too much, eating too late, eating too spicy, eating two hours before bed\par -Things to avoid including overeating, spicy foods, tight clothing, eating within three hours of bed, this list is not all inclusive. \par -For treatment of reflux, possible options discussed including diet control, H2 blockers, PPIs, as well as coating motility agents discussed as treatment options. Timing of meals and proximity of last meal to sleep were discussed. If symptoms persist, a formal gastrointestinal evaluation is needed.\par \par problem 5: sicca\par -continue to use Evoxac 30 mg TID\par \par problem 6: Health Maintenance/COVID19 Precautions:\par -s/p Covid-19 vaccine \par \par - Clean your hands often. Wash your hands often with soap and water for at least 20 seconds, especially after blowing your nose, coughing, or sneezing, or having been in a public place.\par - If soap and water are not available, use a hand  that contains at least 60% alcohol.\par - To the extent possible, avoid touching high-touch surfaces in public places - elevator buttons, door handles, handrails, handshaking with people, etc. Use a tissue or your sleeve to cover your hand or finger if you must touch something.\par - Wash your hands after touching surfaces in public places.\par - Avoid touching your face, nose, eyes, etc.\par - Clean and disinfect your home to remove germs: practice routine cleaning of frequently touched surfaces (for example: tables, doorknobs, light switches, handles, desks, toilets, faucets, sinks & cell phones)\par - Avoid crowds, especially in poorly ventilated spaces. Your risk of exposure to respiratory viruses like COVID-19 may increase in crowded, closed-in settings with little air circulation if there are people in the crowd who are sick. All patients are recommended to practice social distancing and stay at least 6 feet away from others.\par - Avoid all non-essential travel including plane trips, and especially avoid embarking on cruise ships.\par -If COVID-19 is spreading in your community, take extra measures to put distance between yourself and other people to further reduce your risk of being exposed to this new virus.\par -Stay home as much as possible.\par - Consider ways of getting food brought to your house through family, social, or commercial networks\par -Be aware that the virus has been known to live in the air up to 3 hours post exposure, cardboard up to 24 hours post exposure, copper up to 4 hours post exposure, steel and plastic up to 2-3 days post exposure. Risk of transmission from these surfaces are affected by many variables.\par Immune Support Recommendations:\par -OTC Vitamin C 500mg BID \par -OTC Quercetin 250-500mg BID \par -OTC Zinc 75-100mg per day \par -OTC Melatonin 1 or 2 mg a night \par -OTC Vitamin D 1-4000mg per day \par -OTC Tonic Water 8oz per day\par Asthma and COVID19:\par You need to make sure your asthma is under control. This often requires the use of inhaled corticosteroids (and sometimes oral corticosteroids). Inhaled corticosteroids do not likely reduce your immune system’s ability to fight infections, but oral corticosteroids may. It is important to use the steps above to protect yourself to limit your exposure to any respiratory virus. \par \par problem 7: health maintenance \par -recommended yearly flu shot after October 15\par -recommended strep pneumonia vaccines: Prevnar-13 vaccine, followed by Pneumo vaccine 23 one year following\par -recommended early intervention for URIs\par -recommended regular osteoporosis evaluations\par -recommended early dermatological evaluations\par -recommended after the age of 50 to the age of 70, colonoscopy every 5 years \par \par F/U in 4 months\par She is encouraged to call with any changes, concerns, or questions

## 2021-05-05 ENCOUNTER — NON-APPOINTMENT (OUTPATIENT)
Age: 76
End: 2021-05-05

## 2021-05-05 ENCOUNTER — APPOINTMENT (OUTPATIENT)
Dept: ORTHOPEDIC SURGERY | Facility: CLINIC | Age: 76
End: 2021-05-05
Payer: MEDICARE

## 2021-05-05 VITALS — BODY MASS INDEX: 21.16 KG/M2 | HEIGHT: 62 IN | WEIGHT: 115 LBS

## 2021-05-05 PROCEDURE — 20610 DRAIN/INJ JOINT/BURSA W/O US: CPT | Mod: RT

## 2021-05-05 PROCEDURE — 99204 OFFICE O/P NEW MOD 45 MIN: CPT | Mod: 25

## 2021-05-05 PROCEDURE — 73562 X-RAY EXAM OF KNEE 3: CPT | Mod: 50

## 2021-05-05 PROCEDURE — 72100 X-RAY EXAM L-S SPINE 2/3 VWS: CPT

## 2021-05-05 PROCEDURE — 72170 X-RAY EXAM OF PELVIS: CPT

## 2021-05-05 NOTE — DISCUSSION/SUMMARY
[de-identified] : I discussed the underlying pathophysiology of the patient's condition in great detail with the patient. I went over the patient's x-rays with them in great detail. The extent of the patient’s arthritis was discussed in great detail with them. We discussed the use of ice, Tylenol and anti-inflammatories to relieve pain. At-home strengthening, and stretching exercises were discussed and demonstrated with the patient. We went over the wide ranging benefits of exercise for the patient health and I encouraged her to begin a diligent exercise program. She needs to avoid high-impact activities such as running and jumping. I recommend alternative activities such as riding a stationary bike on low tension, or the elliptical. She should focus on light weight and high repetition exercises. Viscosupplementation was discussed as a solution to the patient's symptoms, and a booklet with information was provided. We will consider Monovisc injections after she returns from [place].\par The patient elected to receive a cortisone injection into her right knee today, and tolerated it well. I instructed the patient on ROM exercises, and told them to take it easy. The use of ice and rest was reviewed with the patient. The patient may resume activities tomorrow. All of her questions were answered. She understands and consents to the plan. \par \par FU 1-2 weeks.\par after a right knee cortisone injection today (05/05/2021).\par for a left knee cortisone injection.

## 2021-05-05 NOTE — ADDENDUM
[FreeTextEntry1] : I, Christian Rose, acted solely as a scribe for Dr. Heriberto Buenrostro on this date 05/05/2021.\par All medical record entries made by the Scribe were at my, Dr. Heriberto Buenrostro, direction and personally dictated by me on 05/05/2021. I have reviewed the chart and agree that the record accurately reflects my personal performance of the history, physical exam, assessment and plan. I have also personally directed, reviewed, and agreed with the chart.

## 2021-05-05 NOTE — REASON FOR VISIT
[Initial Visit] : an initial visit for [Knee Pain] : knee pain [FreeTextEntry2] : bilateral hip pain

## 2021-05-05 NOTE — HISTORY OF PRESENT ILLNESS
[de-identified] : 75 year old female presents complaining of bilateral knee pain, left currently worse than right, as well as bilateral groin pain. She denies injury. She notes that her right knee started to bother her a couple of years ago. She was given a cortisone injection, which helped. She has not had treatment for her left knee. Her knees do not buckle or swell but she has experienced weakness. She sleeps with her knees propped up onto pillows at night. She has been doing PT for quite some time due to the 8 spinal surgeries she has had with Dr. Blancas. The most recent surgery was in 2017. She has been also been seeing Dr. Alex for chronic pain management and currently wears a stimulator and monitor. She also complains of bilateral hip pain, and pain in her buttocks and thighs bilaterally. She has chronic neuropathy and scar tissue as a result from her surgeries. She takes Skelaxin, cyclobenzaprine and Morphine for chronic pain. She also uses 5% Lidoderm patches. Of note, She notes an abdominal surgery about 8 weeks ago. She notes that she is going to [place] in 2 weeks.

## 2021-05-05 NOTE — PHYSICAL EXAM
[de-identified] : Constitutional\par o Appearance : well-nourished, well developed, alert, in no acute distress \par Head and Face\par o Head :\par ¦ Inspection : atraumatic, normocephalic\par o Face :\par ¦ Inspection : no visible rash or discoloration\par Respiratory\par o Respiratory Effort: breathing unlabored \par Neurologic\par o Mental Status Examination :\par ¦ Orientation : alert and oriented X 3\par Psychiatric\par o Mood and Affect: mood normal, affect appropriate\par Cardiovascular\par o Observation/Palpation : - no swelling\par Lymphatic\par o Additional Nodes : No palpable lymph nodes present\par  \par Right Lower Extremity\par o Buttock : no tenderness, swelling or deformities\par o Right Hip :\par ¦ Inspection/Palpation : no tenderness, swelling or deformities\par ¦ Range of Motion : full and painless in all planes, no crepitance\par ¦ Stability : joint stability intact\par ¦ Strength : extension, flexion, adduction, abduction, internal rotation and external rotation 5/5 \par \par o Right Knee :\par ¦ Inspection/Palpation : medial compartment tenderness to palpation, mild swelling\par ¦ Range of Motion : 5-130°, no crepitance, good patellofemoral glide \par ¦ Stability : no valgus or varus instability present on provocative testing\par ¦ Strength : flexion and extension 5/5\par ¦ Tests and Signs : all tests for stability normal\par o Reflexes : patellar tendon reflex 2+, ankle reflex 2+, Babinski sign absent (toes downgoing) \par \par Left Lower Extremity\par o Buttock : no tenderness, swelling or deformities \par o Left Hip :\par ¦ Inspection/Palpation : no tenderness, swelling or deformities\par ¦ Range of Motion : full and painless in all planes, no crepitance\par ¦ Stability : joint stability intact\par ¦ Strength : extension, flexion, adduction, abduction, internal rotation and external rotation 5/5\par \par o Left Knee :\par ¦ Inspection/Palpation : medial compartment tenderness to palpation, no swelling\par ¦ Range of Motion : FROM, no crepitance, good patellofemoral glide \par ¦ Stability : no valgus or varus instability present on provocative testing\par ¦ Strength : flexion and extension 5/5\par ¦ Tests and Signs : all tests for stability normal\par o Reflexes : patellar tendon reflex 2+, ankle reflex 2+, Babinski sign absent (toes downgoing) \par  \par Gait: gait normal, no significant extremity swelling or lymphedema, equal leg lengths, no foot drop\par \par Radiology Results \par o Lumbosacral Spine : AP and lateral views were obtained and revealed a fusion of the entire lumbosacral spine from T11 to the sacrum with a spinal cord stimulator.\par o Pelvis: AP pelvis was obtained and revealed mild to moderate arthritis of both hips.\par o Right Knee: Standing AP, lateral and tunnel views were obtained and revealed mild to moderate medial and patellofemoral arthritis.\par o Left Knee: Standing AP, lateral and tunnel views were obtained and revealed moderate medial compartment narrowing with calcification of the medial meniscus, moderate patellofemoral arthritis.\par \par o Right Knee injection : Indication- knee osteoarthritis, Anatomic location- right intra-articular joint space, Spray - area was sterilized with Betadine and alcohol and anesthetized with Ethyl Chloride , needle used-20G, Medications given- 5cc's lidocaine, 0.5cc's kenalog, 0.5 cc's dexamethasone, and patient tolerated it well.

## 2021-05-13 ENCOUNTER — APPOINTMENT (OUTPATIENT)
Dept: ORTHOPEDIC SURGERY | Facility: CLINIC | Age: 76
End: 2021-05-13
Payer: MEDICARE

## 2021-05-13 PROCEDURE — 20611 DRAIN/INJ JOINT/BURSA W/US: CPT | Mod: RT

## 2021-05-13 PROCEDURE — 99213 OFFICE O/P EST LOW 20 MIN: CPT | Mod: 25

## 2021-05-13 PROCEDURE — 20610 DRAIN/INJ JOINT/BURSA W/O US: CPT | Mod: 59,LT

## 2021-05-13 NOTE — ADDENDUM
[FreeTextEntry1] : I, Christian Rose, acted solely as a scribe for Dr. Heriberto Buenrostro on this date 05/13/2021.\par All medical record entries made by the Scribe were at my, Dr. Heriberto Buenrostro, direction and personally dictated by me on 05/13/2021. I have reviewed the chart and agree that the record accurately reflects my personal performance of the history, physical exam, assessment and plan. I have also personally directed, reviewed, and agreed with the chart.

## 2021-05-13 NOTE — PHYSICAL EXAM
[de-identified] : Constitutional\par o Appearance : well-nourished, well developed, alert, in no acute distress \par Head and Face\par o Head :\par ¦ Inspection : atraumatic, normocephalic\par o Face :\par ¦ Inspection : no visible rash or discoloration\par Respiratory\par o Respiratory Effort: breathing unlabored \par Neurologic\par o Mental Status Examination :\par ¦ Orientation : alert and oriented X 3\par Psychiatric\par o Mood and Affect: mood normal, affect appropriate\par Cardiovascular\par o Observation/Palpation : - no swelling\par Lymphatic\par o Additional Nodes : No palpable lymph nodes present\par  \par Right Lower Extremity\par o Buttock : no tenderness, swelling or deformities\par o Right Hip :\par ¦ Inspection/Palpation : no tenderness, swelling or deformities\par ¦ Range of Motion : full and painless in all planes, no crepitance\par ¦ Stability : joint stability intact\par ¦ Strength : extension, flexion, adduction, abduction, internal rotation and external rotation 5/5 \par \par o Right Knee :\par ¦ Inspection/Palpation :mild  medial compartment tenderness to palpation, mild swelling\par ¦ Range of Motion : 5-130°, no crepitance, good patellofemoral glide \par ¦ Stability : no valgus or varus instability present on provocative testing\par ¦ Strength : flexion and extension 5/5\par ¦ Tests and Signs : all tests for stability normal\par o Reflexes : patellar tendon reflex 2+, ankle reflex 2+, Babinski sign absent (toes downgoing) \par \par Left Lower Extremity\par o Buttock : no tenderness, swelling or deformities \par o Left Hip :\par ¦ Inspection/Palpation : no tenderness, swelling or deformities\par ¦ Range of Motion : full and painless in all planes, no crepitance\par ¦ Stability : joint stability intact\par ¦ Strength : extension, flexion, adduction, abduction, internal rotation and external rotation 5/5\par \par o Left Knee :\par ¦ Inspection/Palpation : medial compartment tenderness to palpation, no swelling\par ¦ Range of Motion : FROM, no crepitance, good patellofemoral glide \par ¦ Stability : no valgus or varus instability present on provocative testing\par ¦ Strength : flexion and extension 5/5\par ¦ Tests and Signs : all tests for stability normal\par o Reflexes : patellar tendon reflex 2+, ankle reflex 2+, Babinski sign absent (toes downgoing) \par  \par Gait: gait normal, no significant extremity swelling or lymphedema, equal leg lengths, no foot drop\par \par o Right Knee injection : Indication- knee osteoarthritis, Anatomic location- right intra-articular joint space, Spray - area was sterilized with Betadine and alcohol and anesthetized with Ethyl Chloride, needle used-20G, Medications given- 4cc's Monovisc under Ultrasound guidance, and patient tolerated it well. oLot# [id number]  oExp: 2023-02-28\par \par o Left Knee injection : Indication- knee osteoarthritis, Anatomic location- left intra-articular joint space, Spray - area was sterilized with Betadine and alcohol and anesthetized with Ethyl Chloride , needle used-20G, Medications given- 5cc's lidocaine, 0.5cc's kenalog, 0.5 cc's dexamethasone, and patient tolerated it well.

## 2021-05-13 NOTE — REASON FOR VISIT
[Follow-Up Visit] : a follow-up visit for [Knee Pain] : knee pain [FreeTextEntry2] : hip and groin pain

## 2021-05-13 NOTE — HISTORY OF PRESENT ILLNESS
[de-identified] : 75 year old female presents with bilateral knee pain, left worse than right. Her knees do not buckle or swell. She has experienced weakness. She sleeps with her knees propped up onto pillows at night. She received a right knee cortisone injection on 5/5/2021 and notes some relief. She presents for a right knee cortisone injection today (05/13/2021).\par She also complains of bilateral hip and groin pain, as well as buttocks and thigh pain. She has chronic neuropathy and scar tissue as a result from her 8 spinal surgeries that she had with Dr. Blancas. She has been in PT for quite some due to her surgeries. She sees pain management (Dr. Alex), and takes Skelaxin, cyclobenzaprine and Morphine for chronic pain. She also uses 5% Lidoderm patches. She notes that she is getting an injection into her neck tomorrow. Of note, She notes an abdominal surgery about 8 weeks ago. She notes that she is going to [place] in 2 weeks.\par \par Radiology Results taken on 5/5/2021:\par o Lumbosacral Spine : AP and lateral views were obtained and revealed a fusion of the entire lumbosacral spine from T11 to the sacrum with a spinal cord stimulator.\par o Pelvis: AP pelvis was obtained and revealed mild to moderate arthritis of both hips.\par o Right Knee: Standing AP, lateral and tunnel views were obtained and revealed mild to moderate medial and patellofemoral arthritis.\par o Left Knee: Standing AP, lateral and tunnel views were obtained and revealed moderate medial compartment narrowing with calcification of the medial meniscus, moderate patellofemoral arthritis.

## 2021-05-13 NOTE — DISCUSSION/SUMMARY
[de-identified] : I went over the pathophysiology of the patient's symptoms in great detail with the patient. The patient elected to receive a Monovisc injection into her right knee, as well as a cortisone injection into her left knee, and tolerated them well. I instructed the patient on ROM exercises, and told them to take it easy. The use of ice and rest was reviewed with the patient. The patient may resume activities tomorrow. I reminded the patient that it takes 4 to 6 weeks after the Monovisc injection to feel symptom relief. All of her questions were answered. She understands and consents to the plan. \par \par FU when she returns from her trip.\par after a left knee cortisone injection today (05/13/2021).\par and a right knee Monovisc injection today (05/13/2021).\par for a left knee Monovisc injection.

## 2021-06-22 ENCOUNTER — APPOINTMENT (OUTPATIENT)
Dept: ORTHOPEDIC SURGERY | Facility: CLINIC | Age: 76
End: 2021-06-22

## 2021-06-28 ENCOUNTER — APPOINTMENT (OUTPATIENT)
Dept: ORTHOPEDIC SURGERY | Facility: CLINIC | Age: 76
End: 2021-06-28
Payer: MEDICARE

## 2021-06-28 PROCEDURE — 99214 OFFICE O/P EST MOD 30 MIN: CPT | Mod: 25

## 2021-06-28 PROCEDURE — 20611 DRAIN/INJ JOINT/BURSA W/US: CPT | Mod: LT

## 2021-06-28 NOTE — PHYSICAL EXAM
[de-identified] : Constitutional\par o Appearance : well-nourished, well developed, alert, in no acute distress \par Head and Face\par o Head :\par ¦ Inspection : atraumatic, normocephalic\par o Face :\par ¦ Inspection : no visible rash or discoloration\par Respiratory\par o Respiratory Effort: breathing unlabored \par Neurologic\par o Mental Status Examination :\par ¦ Orientation : alert and oriented X 3\par Psychiatric\par o Mood and Affect: mood normal, affect appropriate\par Cardiovascular\par o Observation/Palpation : - no swelling\par Lymphatic\par o Additional Nodes : No palpable lymph nodes present\par  \par Right Lower Extremity\par o Buttock : no tenderness, swelling or deformities\par o Right Hip :\par ¦ Inspection/Palpation : no tenderness, swelling or deformities\par ¦ Range of Motion : full and painless in all planes, no crepitance\par ¦ Stability : joint stability intact\par ¦ Strength : extension, flexion, adduction, abduction, internal rotation and external rotation 5/5 \par \par o Right Knee :\par ¦ Inspection/Palpation :mild  medial compartment tenderness to palpation, mild swelling\par ¦ Range of Motion : 5-130°, no crepitance, good patellofemoral glide \par ¦ Stability : no valgus or varus instability present on provocative testing\par ¦ Strength : flexion and extension 5/5\par ¦ Tests and Signs : all tests for stability normal\par o Reflexes : patellar tendon reflex 2+, ankle reflex 2+, Babinski sign absent (toes downgoing) \par \par Left Lower Extremity\par o Buttock : no tenderness, swelling or deformities \par o Left Hip :\par ¦ Inspection/Palpation : no tenderness, swelling or deformities\par ¦ Range of Motion : full and painless in all planes, no crepitance\par ¦ Stability : joint stability intact\par ¦ Strength : extension, flexion, adduction, abduction, internal rotation and external rotation 5/5\par \par o Left Knee :\par ¦ Inspection/Palpation : medial compartment tenderness to palpation, no swelling\par ¦ Range of Motion : FROM, no crepitance, good patellofemoral glide \par ¦ Stability : no valgus or varus instability present on provocative testing\par ¦ Strength : flexion and extension 5/5\par ¦ Tests and Signs : all tests for stability normal\par o Reflexes : patellar tendon reflex 2+, ankle reflex 2+, Babinski sign absent (toes downgoing) \par  \par Gait: kyphotic , no significant extremity swelling or lymphedema, equal leg lengths, no foot drop\par \par o Left Knee injection : Indication- knee osteoarthritis, Anatomic location- left intra-articular joint space, Spray - area was sterilized with Betadine and alcohol and anesthetized with Ethyl Chloride, needle used-20G, Medications given- 4cc's Monovisc under Ultrasound guidance. \par oLot# [id number]  oExp: 2023-02-28

## 2021-06-28 NOTE — ADDENDUM
[FreeTextEntry1] : I, Christian Rose, acted solely as a scribe for Dr. Heriberto Buenrostro on this date 06/28/2021.\par All medical record entries made by the Scribe were at my, Dr. Heriberto Buenrostro, direction and personally dictated by me on 06/28/2021. I have reviewed the chart and agree that the record accurately reflects my personal performance of the history, physical exam, assessment and plan. I have also personally directed, reviewed, and agreed with the chart.

## 2021-06-28 NOTE — DISCUSSION/SUMMARY
[de-identified] : I went over the pathophysiology of the patient's symptoms in great detail with the patient. The patient elected to receive a Monovisc injection into her left knee today, and tolerated it well. I instructed the patient on ROM exercises, and told them to take it easy. The use of ice and rest was reviewed with the patient. The patient may resume activities tomorrow. I reminded the patient that it takes 4 to 6 weeks after the injection to feel symptom relief. I am recommending the patient continue going to physical therapy 1-2 times a week to obtain increases in their strength and mobility. A prescription for PT was provided. A referral to a spine surgeon, Dr. Zack Breaux, was provided. All of her questions were answered. She understands and consents to the plan. \par \par FU 6 weeks.\par after a left knee Monovisc injection today (06/28/2021).\par after continuing PT.

## 2021-06-28 NOTE — HISTORY OF PRESENT ILLNESS
[de-identified] : 75 year old female presents with bilateral knee pain, left worse than right. Her knees do not buckle or swell. She has experienced weakness. She sleeps with her knees propped up onto pillows at night. She received a right knee cortisone injection on 5/5/2021 and noted relief. She then had a right knee Monovisc injection on 5/13/2021. She received a left knee cortisone injection on 5/13/2021 but did not get the result that she was hoping for. She presents for a left knee Monovisc injection today (06/28/2021).\par She is also complaining of bilateral hip and groin pain, as well as buttocks and thigh pain. She has chronic neuropathy and scar tissue as a result from her 8 spinal surgeries that she had with Dr. Blancas. She has been in PT for quite some due to her surgeries. She notes that PT helped initially but has been aggravating her symptoms more recently. She sees pain management (Dr. Alex), and takes Skelaxin, cyclobenzaprine and Morphine for chronic pain. She also uses 5% Lidoderm patches. \par \par Radiology Results taken on 5/5/2021:\par o Lumbosacral Spine : AP and lateral views were obtained and revealed a fusion of the entire lumbosacral spine from T11 to the sacrum with a spinal cord stimulator.\par o Pelvis: AP pelvis was obtained and revealed mild to moderate arthritis of both hips.\par o Right Knee: Standing AP, lateral and tunnel views were obtained and revealed mild to moderate medial and patellofemoral arthritis.\par o Left Knee: Standing AP, lateral and tunnel views were obtained and revealed moderate medial compartment narrowing with calcification of the medial meniscus, moderate patellofemoral arthritis.

## 2021-08-09 ENCOUNTER — APPOINTMENT (OUTPATIENT)
Dept: ORTHOPEDIC SURGERY | Facility: CLINIC | Age: 76
End: 2021-08-09
Payer: MEDICARE

## 2021-08-09 PROCEDURE — 20610 DRAIN/INJ JOINT/BURSA W/O US: CPT | Mod: RT

## 2021-08-09 PROCEDURE — 99214 OFFICE O/P EST MOD 30 MIN: CPT | Mod: 25

## 2021-08-09 NOTE — PHYSICAL EXAM
[de-identified] : Constitutional\par o Appearance : well-nourished, well developed, alert, in no acute distress \par Head and Face\par o Head :\par ¦ Inspection : atraumatic, normocephalic\par o Face :\par ¦ Inspection : no visible rash or discoloration\par Respiratory\par o Respiratory Effort: breathing unlabored \par Neurologic\par o Mental Status Examination :\par ¦ Orientation : alert and oriented X 3\par Psychiatric\par o Mood and Affect: mood normal, affect appropriate\par Cardiovascular\par o Observation/Palpation : - no swelling\par Lymphatic\par o Additional Nodes : No palpable lymph nodes present\par \par Lumbosacral Spine\par o Inspection : no visible rash or discoloration\par o Palpation : no paraspinal musculature tenderness\par o Range of Motion : extension causes discomfort, sidebending does not cause discomfort\par o Muscle Strength : paraspinal muscle strength and tone within normal limits\par o Muscle Tone : paraspinal muscle strength and tone within normal limits\par o Tests: straight leg test negative and SARAI test negative bilaterally \par  \par Right Lower Extremity\par o Buttock : no tenderness, swelling or deformities\par o Right Hip :\par ¦ Inspection/Palpation : greater trochanteric and proximal ITB tenderness, no swelling or deformities\par ¦ Range of Motion : full and painless in all planes, no crepitance\par ¦ Stability : joint stability intact\par ¦ Strength : extension, flexion 5/5, adduction, abduction 4+/5, internal rotation and external rotation\par \par o Right Knee :\par ¦ Inspection/Palpation : mild medial compartment tenderness to palpation, mild swelling\par ¦ Range of Motion : 0-130°, no crepitance, good patellofemoral glide \par ¦ Stability : no valgus or varus instability present on provocative testing\par ¦ Strength : flexion and extension 5/5\par ¦ Tests and Signs : all tests for stability normal\par o Reflexes : patellar tendon reflex 2+, ankle reflex 2+, Babinski sign absent (toes downgoing) \par \par Left Lower Extremity\par o Buttock : no tenderness, swelling or deformities \par o Left Hip :\par ¦ Inspection/Palpation : greater trochanteric tenderness, swelling or deformities\par ¦ Range of Motion : full and painless in all planes, no crepitance\par ¦ Stability : joint stability intact\par ¦ Strength : extension, flexion 5/5, adduction, abduction , internal rotation and external rotation\par \par o Left Knee :\par ¦ Inspection/Palpation : medial compartment tenderness to palpation, no swelling\par ¦ Range of Motion : FROM, no crepitance, good patellofemoral glide \par ¦ Stability : no valgus or varus instability present on provocative testing\par ¦ Strength : flexion and extension 5/5\par ¦ Tests and Signs : all tests for stability normal\par o Reflexes : patellar tendon reflex 2+, ankle reflex 2+, Babinski sign absent (toes downgoing) \par  \par Gait: kyphotic, no significant extremity swelling or lymphedema, equal leg lengths, no foot drop\par \par o Right Hip injection: Anatomic location- right greater trochanteric bursa, Spray - area was sterilized with Betadine and alcohol and anesthetized with Ethyl Chloride , needle used-20G, Medications given- 2cc's lidocaine, 0.5cc's kenalog, 0.5 cc's dexamethasone, and patient tolerated it well.

## 2021-08-09 NOTE — DISCUSSION/SUMMARY
[de-identified] : I went over the pathophysiology of the patient's symptoms in great detail with the patient. The patient elected to receive a cortisone injection into her right hip greater trochanteric bursa today, and tolerated it well. I instructed the patient on ROM exercises, and told them to take it easy. The use of ice and rest was reviewed with the patient. The patient may resume activities tomorrow. I am recommending the patient continue going to physical therapy to obtain increases in their strength and mobility. A prescription for PT was provided. All of her questions were answered. She understands and consents to the plan.\par \par FU 1 month.\par after continuing PT.\par after a right hip greater trochanteric bursa cortisone injection today (08/09/2021).

## 2021-08-09 NOTE — ADDENDUM
[FreeTextEntry1] : I, Christian Rose, acted solely as a scribe for Dr. Heriberto Buenrostro on this date 08/09/2021.\par All medical record entries made by the Scribe were at my, Dr. Heriberto Buenrostro, direction and personally dictated by me on 08/09/2021. I have reviewed the chart and agree that the record accurately reflects my personal performance of the history, physical exam, assessment and plan. I have also personally directed, reviewed, and agreed with the chart.

## 2021-08-09 NOTE — HISTORY OF PRESENT ILLNESS
[de-identified] : 75 year old female presents with bilateral knee pain, left worse than right. She had right knee cortisone on 5/5/21 and Monovisc on 5/13/21. She had left knee cortisone on 5/13/21 and Monovisc on 6/28/21. She thinks that these injections helped. She is also complaining of bilateral hip and groin pain, as well as buttocks and thigh pain. She has chronic neuropathy and scar tissue as a result from her 8 spinal surgeries that she had with Dr. Blancas. She has been in PT for quite some time. She states there has been some improvement with PT but is still not happy. Of note, She sees pain management (Dr. Alex), and takes Skelaxin, cyclobenzaprine and Morphine for chronic pain. She also uses 5% Lidoderm patches. \par \par Radiology Results taken on 5/5/2021:\par o Lumbosacral Spine : AP and lateral views were obtained and revealed a fusion of the entire lumbosacral spine from T11 to the sacrum with a spinal cord stimulator.\par o Pelvis: AP pelvis was obtained and revealed mild to moderate arthritis of both hips.\par o Right Knee: Standing AP, lateral and tunnel views were obtained and revealed mild to moderate medial and patellofemoral arthritis.\par o Left Knee: Standing AP, lateral and tunnel views were obtained and revealed moderate medial compartment narrowing with calcification of the medial meniscus, moderate patellofemoral arthritis.

## 2021-09-07 ENCOUNTER — TRANSCRIPTION ENCOUNTER (OUTPATIENT)
Age: 76
End: 2021-09-07

## 2021-09-08 ENCOUNTER — EMERGENCY (EMERGENCY)
Facility: HOSPITAL | Age: 76
LOS: 1 days | Discharge: HOME | End: 2021-09-08
Attending: EMERGENCY MEDICINE
Payer: MEDICARE

## 2021-09-08 VITALS
OXYGEN SATURATION: 99 % | DIASTOLIC BLOOD PRESSURE: 67 MMHG | TEMPERATURE: 98 F | RESPIRATION RATE: 18 BRPM | HEART RATE: 89 BPM | SYSTOLIC BLOOD PRESSURE: 160 MMHG

## 2021-09-08 VITALS
RESPIRATION RATE: 18 BRPM | WEIGHT: 117.07 LBS | OXYGEN SATURATION: 98 % | HEIGHT: 62 IN | DIASTOLIC BLOOD PRESSURE: 63 MMHG | TEMPERATURE: 98 F | SYSTOLIC BLOOD PRESSURE: 153 MMHG | HEART RATE: 93 BPM

## 2021-09-08 DIAGNOSIS — Z98.1 ARTHRODESIS STATUS: Chronic | ICD-10-CM

## 2021-09-08 DIAGNOSIS — Z98.49 CATARACT EXTRACTION STATUS, UNSPECIFIED EYE: Chronic | ICD-10-CM

## 2021-09-08 DIAGNOSIS — Z98.890 OTHER SPECIFIED POSTPROCEDURAL STATES: Chronic | ICD-10-CM

## 2021-09-08 DIAGNOSIS — Z98.89 OTHER SPECIFIED POSTPROCEDURAL STATES: Chronic | ICD-10-CM

## 2021-09-08 DIAGNOSIS — Z96.89 PRESENCE OF OTHER SPECIFIED FUNCTIONAL IMPLANTS: Chronic | ICD-10-CM

## 2021-09-08 DIAGNOSIS — M54.9 DORSALGIA, UNSPECIFIED: Chronic | ICD-10-CM

## 2021-09-08 PROCEDURE — 72125 CT NECK SPINE W/O DYE: CPT | Mod: MA

## 2021-09-08 PROCEDURE — 99285 EMERGENCY DEPT VISIT HI MDM: CPT | Mod: 25

## 2021-09-08 PROCEDURE — 72125 CT NECK SPINE W/O DYE: CPT | Mod: 26,MA

## 2021-09-08 PROCEDURE — 70450 CT HEAD/BRAIN W/O DYE: CPT | Mod: MA

## 2021-09-08 PROCEDURE — 99284 EMERGENCY DEPT VISIT MOD MDM: CPT | Mod: 25

## 2021-09-08 PROCEDURE — 99284 EMERGENCY DEPT VISIT MOD MDM: CPT

## 2021-09-08 PROCEDURE — 70450 CT HEAD/BRAIN W/O DYE: CPT | Mod: 26,MA

## 2021-09-08 PROCEDURE — 13152 CMPLX RPR E/N/E/L 2.6-7.5 CM: CPT

## 2021-09-08 NOTE — ED ADULT TRIAGE NOTE - CHIEF COMPLAINT QUOTE
trip and fall over cord at home. hit head. has lip lac. has headache. remembers event,   denies thinners.

## 2021-09-08 NOTE — ED PROVIDER NOTE - PATIENT PORTAL LINK FT
You can access the FollowMyHealth Patient Portal offered by [facility] by registering at the following website: [email or web address]. By joining [employer]’s FollowMyHealth portal, you will also be able to view your health information using other applications (apps) compatible with our system.

## 2021-09-08 NOTE — ED ADULT NURSE NOTE - NSFALLRSKPASTHIST_ED_ALL_ED
58 y/o man with recently diagnosed metastatic  prostate  cancer  admitted for management of intractable  lower back pain  most likely secondary to  metastases. yes

## 2021-09-08 NOTE — ED PROVIDER NOTE - CARE PROVIDER_API CALL
Levar Spence)  Plastic Surgery; Surgery; Surgical Critical Care  [address] [place] [address]  Phone: [phone]  Fax: [phone]  Follow Up Time:

## 2021-09-08 NOTE — ED ADULT NURSE NOTE - OBJECTIVE STATEMENT
75 year old A&OX4 s/p mechanical fall over a electric cord striking her face causing a lip laceration. No actively bleeding at this time. No other injuries reported. Ambulatory after fall. Not on blood thinners.

## 2021-09-08 NOTE — ED PROVIDER NOTE - NSFOLLOWUPINSTRUCTIONS_ED_ALL_ED_FT
Please follow up with your primary care doctor as soon as possible for review of your ED visit.   Please return to the ED if you develop any new or worsening symptoms.  Please follow with Dr. Spence for suture eval.

## 2021-09-08 NOTE — PROGRESS NOTE ADULT - SUBJECTIVE AND OBJECTIVE BOX
Levar Spence MD & Griselda SINGH  Plastic & Reconstructive Surgery  [address] [facility] [address] [phone]    Patient Info:BANDAR [id number]  [facility] ED  The patient is a  75y  Females/p fall   with open wound lip full thickness    Asked to evaluate by ER    T(C): 36.6 (09-08-21 @ 15:59), Max: 36.6 (09-08-21 @ 15:59)  HR: 93 (09-08-21 @ 15:59) (93 - 93)  BP: 153/63 (09-08-21 @ 15:59) (153/63 - 153/63)  RR: 18 (09-08-21 @ 15:59) (18 - 18)  SpO2: 98% (09-08-21 @ 15:59) (98% - 98%)    RBA of repair reviewed tolerated repair  OK to wash would with soap and water  Aquaphor to suture line    Call office for follow up  Absorbable sutures  PO Abx  soft foods  mouth wash

## 2021-09-08 NOTE — ED PROVIDER NOTE - OBJECTIVE STATEMENT
75yF w/ pmhx of multiple spine surgeries presents with fall and R side of face injury, Lac above R lip that she had difficulty controlling bleeding so came to the ED 75yF w/ pmhx of multiple spine surgeries presents with fall and R side of face injury, Lac above R lip that she had difficulty controlling bleeding so came to the ED      Attn - pt seen in FT35L - agree with above - pt tripped over extension cord and struck face on floor - sustained laceration upper lip and contusion to face.  NO: h/a, dizziness, neck pain, chest/rib pain or extremity pain, numbness or weakness, n/v, LOC.

## 2021-09-08 NOTE — ED PROVIDER NOTE - CLINICAL SUMMARY MEDICAL DECISION MAKING FREE TEXT BOX
Calcagni - 75YF w/ fall and lip lac  CTs negative, will follow with Dr. Spence Mili - 75YF w/ fall and lip lac  CTs negative, will follow with Dr. Spence     Attn - trip and fall with injury to head and laceration upper lip - pt requesting plastic surgery repair - Dr. Spence repaired laceration.  CT head and neck - neg.

## 2021-09-08 NOTE — ED PROVIDER NOTE - PHYSICAL EXAMINATION
Attn - alert, PERRL 2 mm with bilat IOL, abrasion nose, but no epistaxis or deformity, 1 cm laceration upper R lip thru and thru, mandible-, neck-, spine-, chest/ribs-, Extremities - abrasion R knee. neuro -  intact and non-focal..

## 2021-09-08 NOTE — ED ADULT TRIAGE NOTE - WEIGHT IN LBS
From: Sariah Whitmore  To: Anastacio Lopez MD  Sent: 9/21/2017 11:17 AM CDT  Subject: spasming esophogus    Hello. I am just trying to touch base since it has been awhile. I haven't really had any major issues since my ercp in [place]. However I just had my esophagus spasming this past Tuesday. I had noticed the feeling of food going down. I know Dr. Lopez said we would do a scope as needed this time around. So wondering if I should try to keep an eye on things or schedule a scope. If he feels it's time for a scope I would love to try to do it before the end of the year.       thank you,   [phone]

## 2021-09-20 ENCOUNTER — APPOINTMENT (OUTPATIENT)
Dept: ORTHOPEDIC SURGERY | Facility: CLINIC | Age: 76
End: 2021-09-20
Payer: MEDICARE

## 2021-09-20 DIAGNOSIS — M16.9 OSTEOARTHRITIS OF HIP, UNSPECIFIED: ICD-10-CM

## 2021-09-20 PROCEDURE — 99214 OFFICE O/P EST MOD 30 MIN: CPT

## 2021-09-20 NOTE — DISCUSSION/SUMMARY
[de-identified] : I went over the pathophysiology of the patient's symptoms in great detail with the patient. We discussed the use of ice, lidocaine patches and Voltaren gel to relieve pain. I advised her to try using the lidocaine patches on her right hip. At this time, I would like to obtain a CT scan of the patient's right hip. A prescription was provided. She understands the CT scan will not give as much information as an MRI would. I advised her to use a cane. Proper cane walking protocol was discussed and demonstrated with the patient. All of her questions were answered. She understands and consents to the plan.\par \par FU to review her right hip CT scan. \par after using a cane, Voltaren gel and lidoderm patches.

## 2021-09-20 NOTE — PHYSICAL EXAM
[de-identified] : Constitutional\par o Appearance : well-nourished, well developed, alert, in no acute distress \par Head and Face\par o Head :\par ¦ Inspection : atraumatic, normocephalic\par o Face :\par ¦ Inspection : no visible rash or discoloration\par Respiratory\par o Respiratory Effort: breathing unlabored \par Neurologic\par o Mental Status Examination :\par ¦ Orientation : alert and oriented X 3\par Psychiatric\par o Mood and Affect: mood normal, affect appropriate\par Cardiovascular\par o Observation/Palpation : - no swelling\par Lymphatic\par o Additional Nodes : No palpable lymph nodes present\par \par Lumbosacral Spine\par o Inspection : no visible rash or discoloration\par o Palpation : no paraspinal musculature tenderness\par o Range of Motion : limited motion of the lumbosacral spine spine, extension causes discomfort\par o Muscle Strength : paraspinal muscle strength and tone within normal limits\par o Muscle Tone : paraspinal muscle strength and tone within normal limits\par o Tests: straight leg test negative and SARAI test negative bilaterally \par  \par Right Lower Extremity\par o Buttock : no tenderness, swelling or deformities\par o Right Hip :\par ¦ Inspection/Palpation : tenderness over the abductors and greater trochanter, no swelling or deformities\par ¦ Range of Motion : full flexion and rotation, no crepitance\par ¦ Stability : joint stability intact\par ¦ Strength : extension, flexion 4+/5, adduction, abduction 4+/5, internal rotation and external rotation\par \par o Right Knee :\par ¦ Inspection/Palpation : mild medial compartment tenderness to palpation, mild swelling\par ¦ Range of Motion : 0-130°, no crepitance, good patellofemoral glide \par ¦ Stability : no valgus or varus instability present on provocative testing\par ¦ Strength : flexion and extension 5/5\par ¦ Tests and Signs : all tests for stability normal\par o Reflexes : patellar tendon reflex 2+, ankle reflex 2+, Babinski sign absent (toes downgoing) \par \par Left Lower Extremity\par o Buttock : no tenderness, swelling or deformities \par o Left Hip :\par ¦ Inspection/Palpation : greater trochanteric tenderness, no swelling or deformities\par ¦ Range of Motion : full flexion, painless rotation, no crepitance\par ¦ Stability : joint stability intact\par ¦ Strength : extension, flexion 5/5, adduction, abduction , internal rotation and external rotation\par \par o Left Knee :\par ¦ Inspection/Palpation : medial compartment tenderness to palpation, no swelling\par ¦ Range of Motion : FROM, no crepitance, good patellofemoral glide \par ¦ Stability : no valgus or varus instability present on provocative testing\par ¦ Strength : flexion and extension 5/5\par ¦ Tests and Signs : all tests for stability normal\par o Reflexes : patellar tendon reflex 2+, ankle reflex 2+, Babinski sign absent (toes downgoing) \par  \par Gait: kyphotic, no significant extremity swelling or lymphedema, equal leg lengths, no foot drop

## 2021-09-20 NOTE — HISTORY OF PRESENT ILLNESS
[de-identified] : 75 year old female presents complaining of right lateral hip pain. She received a cortisone injection into the greater trochanteric bursa on 8/9/2021 and reports it did nothing. She has chronic neuropathy and scar tissue as a result from her 8 spinal surgeries that she had with Dr. Blancas. She had been in PT for quite some time due to this. She notes her left hip pain has improved. Her right hip motion has improved but not her pain. She claims that she can't walk due to the pain. She notes pain when lying on her right side. She notes she took a hiatus from PT. She became sick and then fell and injured her face. She wants to go back to PT. She has not had acupuncture recently. She tried it in the past and did not respond to it. Of note, She had right knee cortisone on 5/5/21 and Monovisc on 5/13/21. She had left knee cortisone on 5/13/21 and Monovisc on 6/28/21. She thinks that these injections helped. She sees pain management (Dr. Alex), and takes Skelaxin, cyclobenzaprine and Morphine for chronic pain. She uses uses 5% Lidoderm patches on her back. She has a spinal cord stimulator and cannot have an MRI.\par \par Radiology Results taken on 5/5/2021:\par o Lumbosacral Spine : AP and lateral views were obtained and revealed a fusion of the entire lumbosacral spine from T11 to the sacrum with a spinal cord stimulator.\par o Pelvis: AP pelvis was obtained and revealed mild to moderate arthritis of both hips.\par o Right Knee: Standing AP, lateral and tunnel views were obtained and revealed mild to moderate medial and patellofemoral arthritis.\par o Left Knee: Standing AP, lateral and tunnel views were obtained and revealed moderate medial compartment narrowing with calcification of the medial meniscus, moderate patellofemoral arthritis.

## 2021-09-20 NOTE — ADDENDUM
[FreeTextEntry1] : I, Christian Rose, acted solely as a scribe for Dr. Heriberto Buenrostro on this date 09/20/2021.\par All medical record entries made by the Scribe were at my, Dr. Heriberto Buenrostro, direction and personally dictated by me on 09/20/2021. I have reviewed the chart and agree that the record accurately reflects my personal performance of the history, physical exam, assessment and plan. I have also personally directed, reviewed, and agreed with the chart.

## 2021-10-21 ENCOUNTER — RESULT REVIEW (OUTPATIENT)
Age: 76
End: 2021-10-21

## 2021-10-21 ENCOUNTER — OUTPATIENT (OUTPATIENT)
Dept: OUTPATIENT SERVICES | Facility: HOSPITAL | Age: 76
LOS: 1 days | End: 2021-10-21
Payer: MEDICARE

## 2021-10-21 ENCOUNTER — APPOINTMENT (OUTPATIENT)
Dept: CT IMAGING | Facility: CLINIC | Age: 76
End: 2021-10-21
Payer: MEDICARE

## 2021-10-21 DIAGNOSIS — Z98.89 OTHER SPECIFIED POSTPROCEDURAL STATES: Chronic | ICD-10-CM

## 2021-10-21 DIAGNOSIS — M70.61 TROCHANTERIC BURSITIS, RIGHT HIP: ICD-10-CM

## 2021-10-21 DIAGNOSIS — M54.9 DORSALGIA, UNSPECIFIED: Chronic | ICD-10-CM

## 2021-10-21 DIAGNOSIS — Z98.1 ARTHRODESIS STATUS: Chronic | ICD-10-CM

## 2021-10-21 DIAGNOSIS — Z96.89 PRESENCE OF OTHER SPECIFIED FUNCTIONAL IMPLANTS: Chronic | ICD-10-CM

## 2021-10-21 DIAGNOSIS — Z98.49 CATARACT EXTRACTION STATUS, UNSPECIFIED EYE: Chronic | ICD-10-CM

## 2021-10-21 DIAGNOSIS — Z98.890 OTHER SPECIFIED POSTPROCEDURAL STATES: Chronic | ICD-10-CM

## 2021-10-21 PROCEDURE — G1004: CPT

## 2021-10-21 PROCEDURE — 73700 CT LOWER EXTREMITY W/O DYE: CPT

## 2021-10-21 PROCEDURE — 71250 CT THORAX DX C-: CPT | Mod: 26,MH

## 2021-10-21 PROCEDURE — 71250 CT THORAX DX C-: CPT | Mod: ME

## 2021-10-21 PROCEDURE — 73700 CT LOWER EXTREMITY W/O DYE: CPT | Mod: 26,RT,MH

## 2021-10-25 ENCOUNTER — NON-APPOINTMENT (OUTPATIENT)
Age: 76
End: 2021-10-25

## 2021-11-03 ENCOUNTER — NON-APPOINTMENT (OUTPATIENT)
Age: 76
End: 2021-11-03

## 2021-11-24 ENCOUNTER — APPOINTMENT (OUTPATIENT)
Dept: ORTHOPEDIC SURGERY | Facility: CLINIC | Age: 76
End: 2021-11-24
Payer: MEDICARE

## 2021-11-24 PROCEDURE — 99214 OFFICE O/P EST MOD 30 MIN: CPT

## 2021-11-24 NOTE — ADDENDUM
[FreeTextEntry1] : I, Christian Rose, acted solely as a scribe for Dr. Heriberto Buenrostro on this date 11/24/2021.\par All medical record entries made by the Scribe were at my, Dr. Heriberto Buenrostro, direction and personally dictated by me on 11/24/2021. I have reviewed the chart and agree that the record accurately reflects my personal performance of the history, physical exam, assessment and plan. I have also personally directed, reviewed, and agreed with the chart.

## 2021-11-24 NOTE — PHYSICAL EXAM
[de-identified] : Constitutional\par o Appearance : well-nourished, well developed, alert, in no acute distress \par Head and Face\par o Head :\par ¦ Inspection : atraumatic, normocephalic\par o Face :\par ¦ Inspection : no visible rash or discoloration\par Respiratory\par o Respiratory Effort: breathing unlabored \par Neurologic\par o Mental Status Examination :\par ¦ Orientation : alert and oriented X 3\par Psychiatric\par o Mood and Affect: mood normal, affect appropriate\par Cardiovascular\par o Observation/Palpation : - no swelling\par Lymphatic\par o Additional Nodes : No palpable lymph nodes present\par \par Lumbosacral Spine\par o Inspection : no visible rash or discoloration\par o Palpation : no paraspinal musculature tenderness\par o Range of Motion : limited motion of the lumbosacral spine spine, extension causes discomfort\par o Muscle Strength : paraspinal muscle strength and tone within normal limits\par o Muscle Tone : paraspinal muscle strength and tone within normal limits\par o Tests: straight leg test negative and SARAI test negative bilaterally \par  \par Right Lower Extremity\par o Buttock : no tenderness, swelling or deformities\par o Right Hip :\par ¦ Inspection/Palpation : trochanteric tenderness, posterior hip joint tenderness, no swelling or deformities\par ¦ Range of Motion : full flexion, external rotation causes discomfort, no crepitance\par ¦ Stability : joint stability intact\par ¦ Strength : extension, flexion 4+/5, adduction, abduction 4+/5, internal rotation and external rotation\par \par o Right Knee :\par ¦ Inspection/Palpation : mild medial compartment tenderness to palpation, mild swelling\par ¦ Range of Motion : 0-130°, no crepitance, good patellofemoral glide \par ¦ Stability : no valgus or varus instability present on provocative testing\par ¦ Strength : flexion and extension 5/5\par ¦ Tests and Signs : all tests for stability normal\par o Reflexes : patellar tendon reflex 2+, ankle reflex 2+, Babinski sign absent (toes downgoing) \par \par Left Lower Extremity\par o Buttock : no tenderness, swelling or deformities \par o Left Hip :\par ¦ Inspection/Palpation : greater trochanteric tenderness, no swelling or deformities\par ¦ Range of Motion : full flexion, painless rotation, no crepitance\par ¦ Stability : joint stability intact\par ¦ Strength : extension, flexion 5/5, adduction, abduction , internal rotation and external rotation\par \par o Left Knee :\par ¦ Inspection/Palpation : medial compartment tenderness to palpation, no swelling\par ¦ Range of Motion : FROM, no crepitance, good patellofemoral glide \par ¦ Stability : no valgus or varus instability present on provocative testing\par ¦ Strength : flexion and extension 5/5\par ¦ Tests and Signs : all tests for stability normal\par o Reflexes : patellar tendon reflex 2+, ankle reflex 2+, Babinski sign absent (toes downgoing) \par  \par Gait: kyphotic, no significant extremity swelling or lymphedema, equal leg lengths, no foot drop, limited core strength

## 2021-11-24 NOTE — HISTORY OF PRESENT ILLNESS
[de-identified] : 75 year old female presents complaining of right hip pain. It is worse laterally and posteriorly. She received greater trochanteric cortisone injection on 8/9/2021 and reports it did nothing. She notes that she exercises every day. She claims that she can't walk due to the pain. She notes pain when lying on her right side. She presents ambulating independently. She presents today to review her CT scan results. She notes a hx of DM controlled by diet, A1c 5.8%.\par Of note: She has chronic neuropathy and scar tissue after 8 spinal surgeries with Dr. Blancas. She did PT for quite some time after her surgeries. She has not had acupuncture recently. She tried it in the past and did not respond to it. She sees pain management (Dr. Alex) and takes Skelaxin, cyclobenzaprine and morphine for chronic pain. She uses uses 5% Lidoderm patches on her back. She has a spinal cord stimulator and cannot have an MRI.\par Of note: She had right knee cortisone on 5/5/21 and Monovisc on 5/13/21. She had left knee cortisone on 5/13/21 and Monovisc on 6/28/21. She thinks that these injections helped. \par \par CT Scan of the Right Hip obtained on 10/21/2021 revealed:\par 1.  Moderate posterior right hip arthrosis.\par 2.  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  Moderate right fat-containing inguinal hernia.\par 4.  Mild tendinosis of the gluteus medius insertion with small calcifications. Mild edema in the right greater trochanteric bursa.\par \par Radiology Results taken on 5/5/2021:\par o Lumbosacral Spine : AP and lateral views were obtained and revealed a fusion of the entire lumbosacral spine from T11 to the sacrum with a spinal cord stimulator.\par o Pelvis: AP pelvis was obtained and revealed mild to moderate arthritis of both hips.\par o Right Knee: Standing AP, lateral and tunnel views were obtained and revealed mild to moderate medial and patellofemoral arthritis.\par o Left Knee: Standing AP, lateral and tunnel views were obtained and revealed moderate medial compartment narrowing with calcification of the medial meniscus, moderate patellofemoral arthritis.

## 2021-11-24 NOTE — DISCUSSION/SUMMARY
[de-identified] : I discussed the underlying pathophysiology of the patient's condition in great detail with the patient. I went over the patient's CT scan with them in great detail. I advised her to see her PCP regarding the incidental finding of an inguinal hernia. I am referring the patient to an interventional radiologist for a steroid injection into the right hip joint. We had a lengthy discussion about the patient's issues, and talked about the benefits and risks of the injection. A referral to Dr. Sukhi Nowak was provided. All of her questions were answered. She understands and consents to the plan.\par \par FU 2 weeks after going for a right hip steroid injection.

## 2021-12-14 ENCOUNTER — RESULT REVIEW (OUTPATIENT)
Age: 76
End: 2021-12-14

## 2021-12-14 ENCOUNTER — OUTPATIENT (OUTPATIENT)
Dept: OUTPATIENT SERVICES | Facility: HOSPITAL | Age: 76
LOS: 1 days | End: 2021-12-14
Payer: MEDICARE

## 2021-12-14 ENCOUNTER — APPOINTMENT (OUTPATIENT)
Dept: RADIOLOGY | Facility: CLINIC | Age: 76
End: 2021-12-14
Payer: MEDICARE

## 2021-12-14 DIAGNOSIS — M54.9 DORSALGIA, UNSPECIFIED: Chronic | ICD-10-CM

## 2021-12-14 DIAGNOSIS — Z98.89 OTHER SPECIFIED POSTPROCEDURAL STATES: Chronic | ICD-10-CM

## 2021-12-14 DIAGNOSIS — Z98.890 OTHER SPECIFIED POSTPROCEDURAL STATES: Chronic | ICD-10-CM

## 2021-12-14 DIAGNOSIS — Z98.49 CATARACT EXTRACTION STATUS, UNSPECIFIED EYE: Chronic | ICD-10-CM

## 2021-12-14 DIAGNOSIS — Z96.89 PRESENCE OF OTHER SPECIFIED FUNCTIONAL IMPLANTS: Chronic | ICD-10-CM

## 2021-12-14 DIAGNOSIS — Z98.1 ARTHRODESIS STATUS: Chronic | ICD-10-CM

## 2021-12-14 DIAGNOSIS — M16.9 OSTEOARTHRITIS OF HIP, UNSPECIFIED: ICD-10-CM

## 2021-12-14 PROCEDURE — 27093 INJECTION FOR HIP X-RAY: CPT | Mod: RT

## 2021-12-14 PROCEDURE — 73525 CONTRAST X-RAY OF HIP: CPT

## 2021-12-14 PROCEDURE — 27093 INJECTION FOR HIP X-RAY: CPT

## 2021-12-14 PROCEDURE — 73525 CONTRAST X-RAY OF HIP: CPT | Mod: 26,RT

## 2021-12-15 ENCOUNTER — NON-APPOINTMENT (OUTPATIENT)
Age: 76
End: 2021-12-15

## 2022-01-03 ENCOUNTER — APPOINTMENT (OUTPATIENT)
Dept: ORTHOPEDIC SURGERY | Facility: CLINIC | Age: 77
End: 2022-01-03
Payer: MEDICARE

## 2022-01-03 PROCEDURE — 20611 DRAIN/INJ JOINT/BURSA W/US: CPT | Mod: RT

## 2022-01-03 PROCEDURE — 99214 OFFICE O/P EST MOD 30 MIN: CPT | Mod: 25

## 2022-01-03 NOTE — DISCUSSION/SUMMARY
[de-identified] : I went over the pathophysiology of the patient's symptoms in great detail with the patient. The patient elected to receive a Monovisc injection into her right knee today and tolerated it well. I instructed the patient on ROM exercises and told them to take it easy. The use of ice and rest was reviewed with the patient. The patient may resume activities tomorrow. I reminded the patient that it takes 4 to 6 weeks after the injection to feel symptom relief. She will follow-up for the left knee injection. All of her questions were answered. She understands and consents to the plan.\par \par FU 1-2 weeks.\par after a right knee Monovisc injection today (01/03/2022). \par for a left knee Monovisc injection.

## 2022-01-03 NOTE — HISTORY OF PRESENT ILLNESS
[de-identified] : 76 year old female presents complaining of right hip pain. It is worse laterally and posteriorly. She received greater trochanteric cortisone injection on 8/9/2021 and reports it did nothing. She received an x-ray guided intra-articular right hip steroid injection on 12/14/21 and reports 70% improvement. She still has residual pain and thinks it is coming from her back. She has chronic low back pain, neuropathy and scar tissue after 8 spinal surgeries with Dr. Blancas, most recent 2017. She did PT for quite some time after her surgeries. She has not had acupuncture recently. She tried it in the past and did not respond to it. She sees pain management (Dr. Alex) and takes Skelaxin, cyclobenzaprine and morphine for chronic pain. She uses uses 5% Lidoderm patches on her back. She has a spinal cord stimulator and cannot have an MRI.\par She notes that her right knee has started acting up for a couple of weeks. She had right knee cortisone on 5/5/21 and Monovisc on 5/13/21. She had left knee cortisone on 5/13/21 and Monovisc on 6/28/21. She is ready for another round of gel shots.\par Pmhx of DM controlled by diet, A1c 5.8%.\par \par CT Scan of the Right Hip obtained on 10/21/2021 revealed:\par 1.  Moderate posterior right hip arthrosis.\par 2.  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  Moderate right fat-containing inguinal hernia.\par 4.  Mild tendinosis of the gluteus medius insertion with small calcifications. Mild edema in the right greater trochanteric bursa.\par \par Radiology Results taken on 5/5/2021:\par o Lumbosacral Spine : AP and lateral views were obtained and revealed a fusion of the entire lumbosacral spine from T11 to the sacrum with a spinal cord stimulator.\par o Pelvis: AP pelvis was obtained and revealed mild to moderate arthritis of both hips.\par o Right Knee: Standing AP, lateral and tunnel views were obtained and revealed mild to moderate medial and patellofemoral arthritis.\par o Left Knee: Standing AP, lateral and tunnel views were obtained and revealed moderate medial compartment narrowing with calcification of the medial meniscus, moderate patellofemoral arthritis.

## 2022-01-03 NOTE — ADDENDUM
[FreeTextEntry1] : I, Christian Rose, acted solely as a scribe for Dr. Heriberto Buenrostro on this date 01/03/2022.\par All medical record entries made by the Scribe were at my, Dr. Heriberto Buenrostro, direction and personally dictated by me on 01/03/2022. I have reviewed the chart and agree that the record accurately reflects my personal performance of the history, physical exam, assessment and plan. I have also personally directed, reviewed, and agreed with the chart.

## 2022-01-03 NOTE — PHYSICAL EXAM
[de-identified] : Constitutional\par o Appearance : well-nourished, well developed, alert, in no acute distress \par Head and Face\par o Head :\par ¦ Inspection : atraumatic, normocephalic\par o Face :\par ¦ Inspection : no visible rash or discoloration\par Respiratory\par o Respiratory Effort: breathing unlabored \par Neurologic\par o Mental Status Examination :\par ¦ Orientation : alert and oriented X 3\par Psychiatric\par o Mood and Affect: mood normal, affect appropriate\par Cardiovascular\par o Observation/Palpation : - no swelling\par Lymphatic\par o Additional Nodes : No palpable lymph nodes present\par \par Lumbosacral Spine\par o Inspection : no visible rash or discoloration\par o Palpation : no paraspinal musculature tenderness\par o Range of Motion : limited motion of the lumbosacral spine spine, extension causes no discomfort, sidebending to the right causes parascapular discomfort on the left, rotation causes minimal discomfort\par o Muscle Strength : paraspinal muscle strength and tone within normal limits\par o Muscle Tone : paraspinal muscle strength and tone within normal limits\par o Tests: straight leg test negative and SARAI test negative bilaterally \par  \par Right Lower Extremity\par o Buttock : no tenderness, swelling or deformities\par o Right Hip :\par ¦ Inspection/Palpation : trochanteric tenderness, posterior hip joint tenderness, no swelling or deformities\par ¦ Range of Motion : full flexion and rotation,  full external rotation causes discomfort, no pain with rotation in extension, no crepitance\par ¦ Stability : joint stability intact\par ¦ Strength : extension 4+/5, flexion 4+/5, adduction, abduction 4+/5, internal rotation and external rotation\par \par o Right Knee :\par ¦ Inspection/Palpation : mild medial compartment tenderness to palpation, mild swelling\par ¦ Range of Motion : 0-130°, no crepitance, good patellofemoral glide \par ¦ Stability : no valgus or varus instability present on provocative testing\par ¦ Strength : flexion and extension 5/5\par ¦ Tests and Signs : all tests for stability normal\par o Reflexes : patellar tendon reflex 2+, ankle reflex 2+, Babinski sign absent (toes downgoing) \par \par Left Lower Extremity\par o Buttock : no tenderness, swelling or deformities \par o Left Hip :\par ¦ Inspection/Palpation : greater trochanteric tenderness, no swelling or deformities\par ¦ Range of Motion : full flexion, painless rotation, no crepitance\par ¦ Stability : joint stability intact\par ¦ Strength : extension, flexion 5/5, adduction, abduction , internal rotation and external rotation\par \par o Left Knee :\par ¦ Inspection/Palpation : medial compartment tenderness to palpation, no swelling\par ¦ Range of Motion : FROM, no crepitance, good patellofemoral glide \par ¦ Stability : no valgus or varus instability present on provocative testing\par ¦ Strength : flexion and extension 5/5\par ¦ Tests and Signs : all tests for stability normal\par o Reflexes : patellar tendon reflex 2+, ankle reflex 2+, Babinski sign absent (toes downgoing) \par  \par Gait: kyphotic, no significant extremity swelling or lymphedema, equal leg lengths, no foot drop, reasonable core strength\par \par o Right Knee injection : Indication- knee osteoarthritis, Anatomic location- right intra-articular joint space, Spray - area was sterilized with Betadine and alcohol and anesthetized with Ethyl Chloride, needle used-20G, Medications given- 4cc's Monovisc under Ultrasound guidance, and patient tolerated it well.  Analilia# [id number]   oExp: 2023-12-31

## 2022-01-13 ENCOUNTER — APPOINTMENT (OUTPATIENT)
Dept: ORTHOPEDIC SURGERY | Facility: CLINIC | Age: 77
End: 2022-01-13

## 2022-02-09 ENCOUNTER — APPOINTMENT (OUTPATIENT)
Dept: ORTHOPEDIC SURGERY | Facility: CLINIC | Age: 77
End: 2022-02-09

## 2022-02-24 NOTE — ASU PATIENT PROFILE, ADULT - TEACHING/LEARNING CULTURAL CONSIDERATIONS
none 34 year old female with a PMHx of an ectopic pregnancy and PSHx of left oophorectomy removed presents to the ED with left lower pain for three days. The patient states her IUD was removed one month ago, and she started on oral birth control since then. Patient noted she developed LLQ pain 3 days ago, after which she visited her PMD and was started on antibiotics with a diagnosis of PID at the time. Patient notes, however, that the pain has persisted and so she was told to visit the ED. Patient denies any nausea ,vomiting, diarrhea, fevers, chills, current vaginal bleeding or discharge, and all other symptoms. NKDA.

## 2022-03-03 ENCOUNTER — APPOINTMENT (OUTPATIENT)
Dept: PULMONOLOGY | Facility: CLINIC | Age: 77
End: 2022-03-03
Payer: MEDICARE

## 2022-03-03 ENCOUNTER — NON-APPOINTMENT (OUTPATIENT)
Age: 77
End: 2022-03-03

## 2022-03-03 VITALS
DIASTOLIC BLOOD PRESSURE: 70 MMHG | WEIGHT: 115 LBS | HEIGHT: 62 IN | SYSTOLIC BLOOD PRESSURE: 130 MMHG | BODY MASS INDEX: 21.16 KG/M2 | RESPIRATION RATE: 16 BRPM | TEMPERATURE: 97.3 F | OXYGEN SATURATION: 98 % | HEART RATE: 97 BPM

## 2022-03-03 PROCEDURE — 94010 BREATHING CAPACITY TEST: CPT

## 2022-03-03 PROCEDURE — 99214 OFFICE O/P EST MOD 30 MIN: CPT | Mod: 25

## 2022-03-03 PROCEDURE — 95012 NITRIC OXIDE EXP GAS DETER: CPT

## 2022-03-03 NOTE — PROCEDURE
[FreeTextEntry1] : CT CHEST(10/21/2021) IMPRESSION: Focal right lower and left lower lobe peripheral groundglass opacities are new from the prior study. Emphysema. A few bilateral pulmonary nodules measure 3 mm and are new from the prior study. These findings are indeterminate, three-month follow-up CT is needed for complete evaluation.\par \par PFT revealed normal flows, with a FEV1 of 2.50L, which is 137% of predicted, with a normal flow volume loop \par \par FENO was 22; a normal value being less than 25. Fractional exhaled nitric oxide (FENO) is regarded as a simple, noninvasive method for assessing eosinophilic airway inflammation. Produced by a variety of cells within the lung, nitric oxide (NO) concentrations are generally low in healthy individuals. However, high concentrations of NO appear to be involved in nonspecific host defense mechanisms and chronic inflammatory  diseases such as asthma. The American Thoracic Society (ATS) therefore recommended using FENO to aid in the diagnosis and monitoring of eosinophilic airway inflammation and asthma, and for identifying steroid responsive individuals whose chronic respiratory symptoms may be caused by airway inflammation

## 2022-03-03 NOTE — ASSESSMENT
[FreeTextEntry1] : Ms. Kenney is a 76 year old female who has a history of DM, HLD, HTN, neurontin, hypothyroid, IBS, OA, asthma, allergy, and abnormal CT scan, bronchiectasis, GERD, and orthopedic issues. She is currently stable from a pulmonary perspective - ?TBM (wheeze)\par \par Problem 1: Bronchiectasis\par - Recommended Acapella device.\par Seen on the CT of the chest or chest x-ray signifies damaged bronchial tubes focal or diffuse which can be sites of recurrent infections. These areas can be colonized by various organisms including bacteria (hemophilous influenzae/Pseudomonas species etc.) as well as acid fast bacilli (mycobacterial disease- inclusive of TB/MACI etc.). Sputum either for bacteria culture/sensitivity or AFB culture and sensitivity will need to be sent if the patient has sputum- 3 specimens on consecutive days will need to be dropped at the laboratory- if the patient can produce sputum.\par \par problem 2: asthma (active)- likely\par -continue to use Breo Ellipta 200 at 1 inhalation QD (Noncompliant- needs to improve)\par -continue Ventolin as a rescue inhaler 2 inhalations pre-exercise \par -Asthma is  believed to be caused by inherited (genetic) and environmental factor, but its exact cause is unknown. Asthma may be triggered by allergens, lung infections, or irritants in the air. Asthma triggers are different for each person\par -Inhaler technique reviewed as well as oral hygiene techniques reviewed with patient. Avoidance of cold air, extremes of temperature, rescue inhaler should be used before exercise. Order of medication reviewed with patient. Recommended use of a cool mist humidifier in the bedroom.\par \par problem 3: allergic rhinitis - #1 issue\par -continue to use Flonase 1 sniff/nostril BID\par -continue to use Astelin 0.15 1 sniff/nostril BID\par -continue to use Clarinex 5 mg QHS \par - Planning on beginning Allegra OTC QAM\par - Recommended the Navage \par -Environmental measures for allergies were encouraged including mattress and pillow cover, air purifier, and \par \par problem 3: abnormal chest CT, ? early cancer (improved- ?inflammation)\par -s/p chest CT in 11/2021 - due 3/2022\par -Differential diagnosis include: (minimally invasive) cancer, BOOP, or hypersensitivity pneumonitis\par -recommended thoracic surgical evaluation to determine treatment environmental controls.\par \par Problem 3A: ?TBM / ?Bronchomalacia/Laryngomalacia\par -Complete Dynamic CT\par -Tracheomalacia is usually acquired in adults and common causes include damage by tracheostomy or endotracheal intubation damaging the tracheal cartilage with increase risk with multiple intubations, prolonged intubation, and concurrent high dose steroid therapy; external chest wall trauma and surgery; chronic compression of the trachea by benign etiologies (eg, benign mediastinal goiter) or malignancy; relapsing polychondritis; or recurrent infection. Tracheomalacia can be asymptomatic, however signs or symptoms can develop as the severity of the airway narrowing progresses with major symptoms include dyspnea, cough, and sputum retention. Other symptoms include severe paroxysms of coughing, wheezing or stridor, barking cough and may be exacerbated by forced expiration, cough, and valsalva maneuver. Tracheomalacia is diagnosed by a bronchoscopic visualization of dynamic airway collapse on dynamic chest CT. Therapy is warranted in symptomatic patients with severe tracheomalacia and includes surgical repair as tracheobronchoplasty. The patient was referred to Dr. Rene Le or Dr. Cricket Serna, at [facility] for a surgical consult. \par \par problem 4: GERD\par -Add Pepcid 40 mg QHS \par -Rule of 2s: avoid eating too much, eating too late, eating too spicy, eating two hours before bed\par -Things to avoid including overeating, spicy foods, tight clothing, eating within three hours of bed, this list is not all inclusive. \par -For treatment of reflux, possible options discussed including diet control, H2 blockers, PPIs, as well as coating motility agents discussed as treatment options. Timing of meals and proximity of last meal to sleep were discussed. If symptoms persist, a formal gastrointestinal evaluation is needed.\par \par problem 5: sicca\par -continue to use Evoxac 30 mg TID\par \par problem 6: Health Maintenance/COVID19 Precautions:\par -s/p Covid-19 vaccine x3\par \par - Clean your hands often. Wash your hands often with soap and water for at least 20 seconds, especially after blowing your nose, coughing, or sneezing, or having been in a public place.\par - If soap and water are not available, use a hand  that contains at least 60% alcohol.\par - To the extent possible, avoid touching high-touch surfaces in public places - elevator buttons, door handles, handrails, handshaking with people, etc. Use a tissue or your sleeve to cover your hand or finger if you must touch something.\par - Wash your hands after touching surfaces in public places.\par - Avoid touching your face, nose, eyes, etc.\par - Clean and disinfect your home to remove germs: practice routine cleaning of frequently touched surfaces (for example: tables, doorknobs, light switches, handles, desks, toilets, faucets, sinks & cell phones)\par - Avoid crowds, especially in poorly ventilated spaces. Your risk of exposure to respiratory viruses like COVID-19 may increase in crowded, closed-in settings with little air circulation if there are people in the crowd who are sick. All patients are recommended to practice social distancing and stay at least 6 feet away from others.\par - Avoid all non-essential travel including plane trips, and especially avoid embarking on cruise ships.\par -If COVID-19 is spreading in your community, take extra measures to put distance between yourself and other people to further reduce your risk of being exposed to this new virus.\par -Stay home as much as possible.\par - Consider ways of getting food brought to your house through family, social, or commercial networks\par -Be aware that the virus has been known to live in the air up to 3 hours post exposure, cardboard up to 24 hours post exposure, copper up to 4 hours post exposure, steel and plastic up to 2-3 days post exposure. Risk of transmission from these surfaces are affected by many variables.\par Immune Support Recommendations:\par -OTC Vitamin C 500mg BID \par -OTC Quercetin 250-500mg BID \par -OTC Zinc 75-100mg per day \par -OTC Melatonin 1 or 2 mg a night \par -OTC Vitamin D 1-4000mg per day \par -OTC Tonic Water 8oz per day\par Asthma and COVID19:\par You need to make sure your asthma is under control. This often requires the use of inhaled corticosteroids (and sometimes oral corticosteroids). Inhaled corticosteroids do not likely reduce your immune system’s ability to fight infections, but oral corticosteroids may. It is important to use the steps above to protect yourself to limit your exposure to any respiratory virus. \par \par problem 7: health maintenance \par -recommended yearly flu shot after October 15\par -recommended strep pneumonia vaccines: Prevnar-13 vaccine, followed by Pneumo vaccine 23 one year following\par -recommended early intervention for URIs\par -recommended regular osteoporosis evaluations\par -recommended early dermatological evaluations\par -recommended after the age of 50 to the age of 70, colonoscopy every 5 years \par \par F/U in 4 months\par She is encouraged to call with any changes, concerns, or questions

## 2022-03-03 NOTE — PHYSICAL EXAM
[No Acute Distress] : no acute distress [Normal Oropharynx] : normal oropharynx [Normal Appearance] : normal appearance [No Neck Mass] : no neck mass [Normal Rate/Rhythm] : normal rate/rhythm [Normal S1, S2] : normal s1, s2 [No Murmurs] : no murmurs [No Resp Distress] : no resp distress [Clear to Auscultation Bilaterally] : clear to auscultation bilaterally [No Abnormalities] : no abnormalities [Benign] : benign [Normal Gait] : normal gait [No Clubbing] : no clubbing [No Cyanosis] : no cyanosis [No Edema] : no edema [FROM] : FROM [Normal Color/ Pigmentation] : normal color/ pigmentation [No Focal Deficits] : no focal deficits [Oriented x3] : oriented x3 [Normal Affect] : normal affect [I] : Mallampati Class: I [Kyphosis] : kyphosis [TextBox_68] : I:E 1:3, forced expiratory wheeze

## 2022-03-03 NOTE — HISTORY OF PRESENT ILLNESS
[FreeTextEntry1] : Ms. Kenney is a 76 year old female presenting to the office for a follow up visit for abnormal chest CT, reflux, allergic rhinitis, asthma, bronchiectasis, chronic rhinitis, lung nodules and shortness of breath. Her chief complaint is \par -she notes she has been feeling good in general\par -she notes her energy level has been good\par -she notes getting HA from an old injury of a door hitting her head\par -she notes she is getting enough sleep now that she is taking gabapentin because it is helping with her pain \par -she notes her bowels are regular\par -she notes her sense of smell and taste are normal\par -she notes she is exercising\par -she notes she is not doing PT formally but she is doing her exercise program at home\par -she notes stretching and strengthening every day\par -she notes coughing sometimes and brings up some sputum\par -she notes her memory is not as good as it used to be\par -she denies SOB\par -she notes having some ordinary reflux but is taking Famotidine in the AM, not QHS\par -she notes having a fear of lung CA\par \par -patient denies any nausea, vomiting, fever, chills, sweats, chest pain, chest pressure, palpitations, wheezing, fatigue, diarrhea, constipation, dysphagia, myalgias, dizziness, leg swelling, leg pain, itchy eyes, itchy ears, heartburn, or sour taste in the mouth

## 2022-03-16 ENCOUNTER — APPOINTMENT (OUTPATIENT)
Dept: ORTHOPEDIC SURGERY | Facility: CLINIC | Age: 77
End: 2022-03-16

## 2022-04-18 NOTE — H&P PST ADULT - NEGATIVE HEMATOLOGY SYMPTOMS
----- Message from Avelina Gayle sent at 4/18/2022  8:52 AM CDT -----  Hospital Consult    Name of Who is Calling: Kelly/ [place]    Room/Bed# :1719- private room    Diagnosis: metabolic encaphalopathy,  possible stents    Referring Physician: SOFÍA Julio    Call Back Number: [phone]      Kelly stated that the pt daughter has been asking to speak with Dr. Pedroza regarding her mother getting stents placed.        no skin lumps/no gum bleeding/no nose bleeding

## 2022-04-26 ENCOUNTER — OUTPATIENT (OUTPATIENT)
Dept: OUTPATIENT SERVICES | Facility: HOSPITAL | Age: 77
LOS: 1 days | End: 2022-04-26
Payer: MEDICARE

## 2022-04-26 ENCOUNTER — APPOINTMENT (OUTPATIENT)
Dept: CT IMAGING | Facility: CLINIC | Age: 77
End: 2022-04-26
Payer: MEDICARE

## 2022-04-26 DIAGNOSIS — Z98.89 OTHER SPECIFIED POSTPROCEDURAL STATES: Chronic | ICD-10-CM

## 2022-04-26 DIAGNOSIS — Z96.89 PRESENCE OF OTHER SPECIFIED FUNCTIONAL IMPLANTS: Chronic | ICD-10-CM

## 2022-04-26 DIAGNOSIS — Z98.49 CATARACT EXTRACTION STATUS, UNSPECIFIED EYE: Chronic | ICD-10-CM

## 2022-04-26 DIAGNOSIS — Z98.1 ARTHRODESIS STATUS: Chronic | ICD-10-CM

## 2022-04-26 DIAGNOSIS — J39.8 OTHER SPECIFIED DISEASES OF UPPER RESPIRATORY TRACT: ICD-10-CM

## 2022-04-26 DIAGNOSIS — M54.9 DORSALGIA, UNSPECIFIED: Chronic | ICD-10-CM

## 2022-04-26 DIAGNOSIS — Z98.890 OTHER SPECIFIED POSTPROCEDURAL STATES: Chronic | ICD-10-CM

## 2022-04-26 PROCEDURE — G1004: CPT

## 2022-04-26 PROCEDURE — 71250 CT THORAX DX C-: CPT | Mod: MG

## 2022-04-26 PROCEDURE — 71250 CT THORAX DX C-: CPT | Mod: 26,MG

## 2022-05-12 ENCOUNTER — NON-APPOINTMENT (OUTPATIENT)
Age: 77
End: 2022-05-12

## 2022-05-24 ENCOUNTER — LABORATORY RESULT (OUTPATIENT)
Age: 77
End: 2022-05-24

## 2022-05-27 DIAGNOSIS — A49.8 OTHER BACTERIAL INFECTIONS OF UNSPECIFIED SITE: ICD-10-CM

## 2022-05-28 LAB — BACTERIA SPT CF RESP CULT: ABNORMAL

## 2022-05-31 RX ORDER — SULFAMETHOXAZOLE AND TRIMETHOPRIM 800; 160 MG/1; MG/1
800-160 TABLET ORAL TWICE DAILY
Qty: 14 | Refills: 0 | Status: ACTIVE | COMMUNITY
Start: 2022-05-31 | End: 1900-01-01

## 2022-05-31 RX ORDER — AMOXICILLIN AND CLAVULANATE POTASSIUM 875; 125 MG/1; MG/1
875-125 TABLET, COATED ORAL
Qty: 20 | Refills: 0 | Status: DISCONTINUED | COMMUNITY
Start: 2022-05-27 | End: 2022-05-31

## 2022-07-14 ENCOUNTER — APPOINTMENT (OUTPATIENT)
Dept: PULMONOLOGY | Facility: CLINIC | Age: 77
End: 2022-07-14

## 2022-07-14 VITALS
RESPIRATION RATE: 16 BRPM | DIASTOLIC BLOOD PRESSURE: 60 MMHG | OXYGEN SATURATION: 98 % | HEIGHT: 60 IN | WEIGHT: 117 LBS | TEMPERATURE: 97.6 F | BODY MASS INDEX: 22.97 KG/M2 | SYSTOLIC BLOOD PRESSURE: 130 MMHG | HEART RATE: 82 BPM

## 2022-07-14 PROCEDURE — 94727 GAS DIL/WSHOT DETER LNG VOL: CPT

## 2022-07-14 PROCEDURE — 95012 NITRIC OXIDE EXP GAS DETER: CPT

## 2022-07-14 PROCEDURE — 99214 OFFICE O/P EST MOD 30 MIN: CPT | Mod: 25

## 2022-07-14 PROCEDURE — 94729 DIFFUSING CAPACITY: CPT

## 2022-07-14 PROCEDURE — 94010 BREATHING CAPACITY TEST: CPT

## 2022-07-14 RX ORDER — AZELASTINE HYDROCHLORIDE 0.5 MG/ML
0.05 SOLUTION/ DROPS OPHTHALMIC
Qty: 6 | Refills: 0 | Status: ACTIVE | COMMUNITY
Start: 2022-04-28

## 2022-07-14 RX ORDER — METRONIDAZOLE 500 MG/1
500 TABLET ORAL
Qty: 14 | Refills: 0 | Status: ACTIVE | COMMUNITY
Start: 2022-03-23

## 2022-07-14 RX ORDER — DENOSUMAB 60 MG/ML
60 INJECTION SUBCUTANEOUS
Qty: 1 | Refills: 0 | Status: ACTIVE | COMMUNITY
Start: 2022-06-27

## 2022-07-14 NOTE — PROCEDURE
[FreeTextEntry1] : Full PFT revealed,  normal flows, with a FEV1 of 2.57L,which is 142% of predicted,  normal lung volumes, and a diffusion of 2.39, which is 69% of predicted with a normal flow volume loop \par \par CT scan 4.26.22 shows IMPRESSION:\par No abnormal tracheobronchial narrowing with dynamic expiration. Resolution prior right lower lobe patchy opacities from the prior study. Patchy left lower lobe opacities are new from the prior study. Patchy right upper lobe opacities are increased compared to the prior study.. Continued follow-up CT recommended.\par \par \par FENO was  21    ; a normal value being less than 25\par Fractional exhaled nitric oxide (FENO) is regarded as a simple, noninvasive method for assessing eosinophilic airway inflammation. Produced by a variety of cells within the lung, nitric oxide (NO) concentrations are generally low in healthy individuals. However, high concentrations of NO appear to be involved in nonspecific host defense mechanisms and chronic inflammatory diseases such as asthma. The American Thoracic Society (ATS) therefore has recommended using FENO to aid in the diagnosis and monitoring of eosinophilic airway inflammation and asthma, and for identifying steroid responsive individuals whose chronic respiratory symptoms may be caused by airway inflammation.

## 2022-07-14 NOTE — ASSESSMENT
[FreeTextEntry1] : Ms. Kenney is a 76 year old female who has a history of DM, HLD, HTN, neurontin, hypothyroid, IBS, OA, asthma, allergy, and abnormal CT scan, bronchiectasis, GERD, and orthopedic issues. She is currently stable from a pulmonary perspective - except back pain \par \par Problem 1: Bronchiectasis\par - Recommended Acapella device.\par Seen on the CT of the chest or chest x-ray signifies damaged bronchial tubes focal or diffuse which can be sites of recurrent infections. These areas can be colonized by various organisms including bacteria (hemophilous influenzae/Pseudomonas species etc.) as well as acid fast bacilli (mycobacterial disease- inclusive of TB/MACI etc.). Sputum either for bacteria culture/sensitivity or AFB culture and sensitivity will need to be sent if the patient has sputum- 3 specimens on consecutive days will need to be dropped at the laboratory- if the patient can produce sputum.\par \par problem 2: asthma (active)- likely\par -continue to use Breo Ellipta 200 at 1 inhalation QD (Noncompliant- needs to improve)\par -continue Ventolin as a rescue inhaler 2 inhalations pre-exercise \par -Asthma is  believed to be caused by inherited (genetic) and environmental factor, but its exact cause is unknown. Asthma may be triggered by allergens, lung infections, or irritants in the air. Asthma triggers are different for each person\par -Inhaler technique reviewed as well as oral hygiene techniques reviewed with patient. Avoidance of cold air, extremes of temperature, rescue inhaler should be used before exercise. Order of medication reviewed with patient. Recommended use of a cool mist humidifier in the bedroom.\par \par problem 3: allergic rhinitis - #1 issue\par -continue to use Flonase 1 sniff/nostril BID\par -continue to use Astelin 0.15 1 sniff/nostril BID\par -continue to use Clarinex 5 mg QHS \par - Planning on beginning Allegra OTC QAM\par - Recommended the Navage \par -Environmental measures for allergies were encouraged including mattress and pillow cover, air purifier, and \par \par problem 3: abnormal chest CT, ? early cancer (improved- ?inflammation)\par -s/p chest CT in 10/2022\par -Differential diagnosis include: (minimally invasive) cancer, BOOP, or hypersensitivity pneumonitis\par -recommended thoracic surgical evaluation to determine treatment environmental controls.\par \par Problem 3A: ?TBM / ?Bronchomalacia/Laryngomalacia (NOT)\par -s/p Dynamic CT 4/2022\par -Tracheomalacia is usually acquired in adults and common causes include damage by tracheostomy or endotracheal intubation damaging the tracheal cartilage with increase risk with multiple intubations, prolonged intubation, and concurrent high dose steroid therapy; external chest wall trauma and surgery; chronic compression of the trachea by benign etiologies (eg, benign mediastinal goiter) or malignancy; relapsing polychondritis; or recurrent infection. Tracheomalacia can be asymptomatic, however signs or symptoms can develop as the severity of the airway narrowing progresses with major symptoms include dyspnea, cough, and sputum retention. Other symptoms include severe paroxysms of coughing, wheezing or stridor, barking cough and may be exacerbated by forced expiration, cough, and valsalva maneuver. Tracheomalacia is diagnosed by a bronchoscopic visualization of dynamic airway collapse on dynamic chest CT. Therapy is warranted in symptomatic patients with severe tracheomalacia and includes surgical repair as tracheobronchoplasty. The patient was referred to Dr. Rene Le or Dr. Cricket Serna, at [facility] for a surgical consult. \par \par problem 4: GERD\par -Add Pepcid 40 mg QHS \par -Rule of 2s: avoid eating too much, eating too late, eating too spicy, eating two hours before bed\par -Things to avoid including overeating, spicy foods, tight clothing, eating within three hours of bed, this list is not all inclusive. \par -For treatment of reflux, possible options discussed including diet control, H2 blockers, PPIs, as well as coating motility agents discussed as treatment options. Timing of meals and proximity of last meal to sleep were discussed. If symptoms persist, a formal gastrointestinal evaluation is needed.\par \par problem 5: sicca\par -continue to use Evoxac 30 mg TID\par \par problem 6: Health Maintenance/COVID19 Precautions:\par -s/p Covid-19 vaccine x3\par \par - Clean your hands often. Wash your hands often with soap and water for at least 20 seconds, especially after blowing your nose, coughing, or sneezing, or having been in a public place.\par - If soap and water are not available, use a hand  that contains at least 60% alcohol.\par - To the extent possible, avoid touching high-touch surfaces in public places - elevator buttons, door handles, handrails, handshaking with people, etc. Use a tissue or your sleeve to cover your hand or finger if you must touch something.\par - Wash your hands after touching surfaces in public places.\par - Avoid touching your face, nose, eyes, etc.\par - Clean and disinfect your home to remove germs: practice routine cleaning of frequently touched surfaces (for example: tables, doorknobs, light switches, handles, desks, toilets, faucets, sinks & cell phones)\par - Avoid crowds, especially in poorly ventilated spaces. Your risk of exposure to respiratory viruses like COVID-19 may increase in crowded, closed-in settings with little air circulation if there are people in the crowd who are sick. All patients are recommended to practice social distancing and stay at least 6 feet away from others.\par - Avoid all non-essential travel including plane trips, and especially avoid embarking on cruise ships.\par -If COVID-19 is spreading in your community, take extra measures to put distance between yourself and other people to further reduce your risk of being exposed to this new virus.\par -Stay home as much as possible.\par - Consider ways of getting food brought to your house through family, social, or commercial networks\par -Be aware that the virus has been known to live in the air up to 3 hours post exposure, cardboard up to 24 hours post exposure, copper up to 4 hours post exposure, steel and plastic up to 2-3 days post exposure. Risk of transmission from these surfaces are affected by many variables.\par Immune Support Recommendations:\par -OTC Vitamin C 500mg BID \par -OTC Quercetin 250-500mg BID \par -OTC Zinc 75-100mg per day \par -OTC Melatonin 1 or 2 mg a night \par -OTC Vitamin D 1-4000mg per day \par -OTC Tonic Water 8oz per day\par Asthma and COVID19:\par You need to make sure your asthma is under control. This often requires the use of inhaled corticosteroids (and sometimes oral corticosteroids). Inhaled corticosteroids do not likely reduce your immune system’s ability to fight infections, but oral corticosteroids may. It is important to use the steps above to protect yourself to limit your exposure to any respiratory virus. \par \par problem 7: health maintenance \par -Recommended Christian Matt "Foundation Stretching" \par -recommended yearly flu shot after October 15\par -recommended strep pneumonia vaccines: Prevnar-13 vaccine, followed by Pneumo vaccine 23 one year following (completed) \par -recommended early intervention for URIs\par -recommended regular osteoporosis evaluations\par -recommended early dermatological evaluations\par -recommended after the age of 50 to the age of 70, colonoscopy every 5 years \par \par F/U in 4 months\par She is encouraged to call with any changes, concerns, or questions

## 2022-07-14 NOTE — HISTORY OF PRESENT ILLNESS
[FreeTextEntry1] : Ms. Kenney is a 76 year old female presenting to the office for a follow up visit for abnormal chest CT, reflux, allergic rhinitis, asthma, bronchiectasis, chronic rhinitis, lung nodules and shortness of breath. Her chief complaint is \par -she notes not being too back \par -she notes neglecting sciatica \par -she notes going to physical therapy and at the first one she got rid of the pain and the following sessions she tried improving on it \par -she denies headaches \par -she notes getting hit by a fire door at work and her neck has been bothering her a bit \par -she notes swelling if she sits for long periods of time \par -she denies hoarsness \par -she notes taking gabapentin for pain (1800 mg at night one shot) \par -she notes at most using only 60 mg of morphin \par -she notes weighing 120 lbs \par patient denies any headaches, nausea, vomiting, fever, chills, sweats, chest pain, chest pressure, palpitations, coughing, wheezing, fatigue, diarrhea, constipation, dysphagia, myalgias, dizziness, leg swelling, leg pain, itchy eyes, itchy ears, heartburn, reflux or sour taste in the mouth

## 2022-07-14 NOTE — PHYSICAL EXAM
[No Acute Distress] : no acute distress [Normal Oropharynx] : normal oropharynx [II] : Mallampati Class: II [Normal Appearance] : normal appearance [No Neck Mass] : no neck mass [Normal Rate/Rhythm] : normal rate/rhythm [Normal S1, S2] : normal s1, s2 [No Murmurs] : no murmurs [No Resp Distress] : no resp distress [Clear to Auscultation Bilaterally] : clear to auscultation bilaterally [No Abnormalities] : no abnormalities [Kyphosis] : kyphosis [Benign] : benign [Normal Gait] : normal gait [No Clubbing] : no clubbing [No Cyanosis] : no cyanosis [No Edema] : no edema [FROM] : FROM [Normal Color/ Pigmentation] : normal color/ pigmentation [No Focal Deficits] : no focal deficits [Oriented x3] : oriented x3 [Normal Affect] : normal affect [TextBox_68] : I:E 1:3, upper airway expiratory wheeze

## 2022-07-14 NOTE — ADDENDUM
[FreeTextEntry1] : Documented by Елена Smith acting as a scribe for Dr. Chance Robertson on 07/14/2022 .\par \par All medical record entries made by the Scribe were at my, Dr. Chance Robertson's, direction and personally dictated by me on. I have reviewed the chart and agree that the record accurately reflects my personal performance of the history, physical exam, assessment and plan. I have also personally directed, reviewed, and agree with the discharge instructions. \par  \par \par \par \par

## 2022-08-28 ENCOUNTER — INPATIENT (INPATIENT)
Facility: HOSPITAL | Age: 77
LOS: 2 days | Discharge: HOME HEALTH | DRG: 917 | End: 2022-08-31
Attending: INTERNAL MEDICINE | Admitting: STUDENT IN AN ORGANIZED HEALTH CARE EDUCATION/TRAINING PROGRAM
Payer: MEDICARE

## 2022-08-28 VITALS
WEIGHT: 117.95 LBS | DIASTOLIC BLOOD PRESSURE: 71 MMHG | HEIGHT: 62 IN | RESPIRATION RATE: 18 BRPM | SYSTOLIC BLOOD PRESSURE: 149 MMHG | HEART RATE: 90 BPM | OXYGEN SATURATION: 95 %

## 2022-08-28 DIAGNOSIS — Z98.1 ARTHRODESIS STATUS: Chronic | ICD-10-CM

## 2022-08-28 DIAGNOSIS — Z98.89 OTHER SPECIFIED POSTPROCEDURAL STATES: Chronic | ICD-10-CM

## 2022-08-28 DIAGNOSIS — Z98.890 OTHER SPECIFIED POSTPROCEDURAL STATES: Chronic | ICD-10-CM

## 2022-08-28 DIAGNOSIS — Z96.89 PRESENCE OF OTHER SPECIFIED FUNCTIONAL IMPLANTS: Chronic | ICD-10-CM

## 2022-08-28 DIAGNOSIS — Z98.49 CATARACT EXTRACTION STATUS, UNSPECIFIED EYE: Chronic | ICD-10-CM

## 2022-08-28 DIAGNOSIS — E87.1 HYPO-OSMOLALITY AND HYPONATREMIA: ICD-10-CM

## 2022-08-28 DIAGNOSIS — M54.9 DORSALGIA, UNSPECIFIED: Chronic | ICD-10-CM

## 2022-08-28 LAB
ALBUMIN SERPL ELPH-MCNC: 4.1 G/DL — SIGNIFICANT CHANGE UP (ref 3.3–5)
ALP SERPL-CCNC: 54 U/L — SIGNIFICANT CHANGE UP (ref 40–120)
ALT FLD-CCNC: 14 U/L — SIGNIFICANT CHANGE UP (ref 10–45)
AMMONIA BLD-MCNC: 37 UMOL/L — SIGNIFICANT CHANGE UP (ref 11–55)
ANION GAP SERPL CALC-SCNC: 12 MMOL/L — SIGNIFICANT CHANGE UP (ref 5–17)
APPEARANCE UR: CLEAR — SIGNIFICANT CHANGE UP
APTT BLD: 31.7 SEC — SIGNIFICANT CHANGE UP (ref 27.5–35.5)
AST SERPL-CCNC: 19 U/L — SIGNIFICANT CHANGE UP (ref 10–40)
BASE EXCESS BLDV CALC-SCNC: -4.1 MMOL/L — LOW (ref -2–3)
BASOPHILS # BLD AUTO: 0.04 K/UL — SIGNIFICANT CHANGE UP (ref 0–0.2)
BASOPHILS NFR BLD AUTO: 0.4 % — SIGNIFICANT CHANGE UP (ref 0–2)
BILIRUB SERPL-MCNC: 0.2 MG/DL — SIGNIFICANT CHANGE UP (ref 0.2–1.2)
BILIRUB UR-MCNC: NEGATIVE — SIGNIFICANT CHANGE UP
BUN SERPL-MCNC: 60 MG/DL — HIGH (ref 7–23)
CA-I SERPL-SCNC: 1.32 MMOL/L — SIGNIFICANT CHANGE UP (ref 1.15–1.33)
CALCIUM SERPL-MCNC: 9.9 MG/DL — SIGNIFICANT CHANGE UP (ref 8.4–10.5)
CHLORIDE BLDV-SCNC: 90 MMOL/L — LOW (ref 96–108)
CHLORIDE SERPL-SCNC: 89 MMOL/L — LOW (ref 96–108)
CHOLEST SERPL-MCNC: 157 MG/DL — SIGNIFICANT CHANGE UP
CO2 BLDV-SCNC: 25 MMOL/L — SIGNIFICANT CHANGE UP (ref 22–26)
CO2 SERPL-SCNC: 23 MMOL/L — SIGNIFICANT CHANGE UP (ref 22–31)
COLOR SPEC: YELLOW — SIGNIFICANT CHANGE UP
CREAT SERPL-MCNC: 1.39 MG/DL — HIGH (ref 0.5–1.3)
DIFF PNL FLD: NEGATIVE — SIGNIFICANT CHANGE UP
EGFR: 39 ML/MIN/1.73M2 — LOW
EOSINOPHIL # BLD AUTO: 0.19 K/UL — SIGNIFICANT CHANGE UP (ref 0–0.5)
EOSINOPHIL NFR BLD AUTO: 1.8 % — SIGNIFICANT CHANGE UP (ref 0–6)
ETHANOL SERPL-MCNC: <10 MG/DL — SIGNIFICANT CHANGE UP (ref 0–10)
FLUAV AG NPH QL: SIGNIFICANT CHANGE UP
FLUBV AG NPH QL: SIGNIFICANT CHANGE UP
GAS PNL BLDV: 123 MMOL/L — LOW (ref 136–145)
GAS PNL BLDV: SIGNIFICANT CHANGE UP
GAS PNL BLDV: SIGNIFICANT CHANGE UP
GLUCOSE BLDV-MCNC: 117 MG/DL — HIGH (ref 70–99)
GLUCOSE SERPL-MCNC: 114 MG/DL — HIGH (ref 70–99)
GLUCOSE UR QL: NEGATIVE — SIGNIFICANT CHANGE UP
HCO3 BLDV-SCNC: 24 MMOL/L — SIGNIFICANT CHANGE UP (ref 22–29)
HCT VFR BLD CALC: 31.2 % — LOW (ref 34.5–45)
HCT VFR BLDA CALC: 30 % — LOW (ref 34.5–46.5)
HDLC SERPL-MCNC: 84 MG/DL — SIGNIFICANT CHANGE UP
HGB BLD CALC-MCNC: 10.1 G/DL — LOW (ref 11.7–16.1)
HGB BLD-MCNC: 10.4 G/DL — LOW (ref 11.5–15.5)
IMM GRANULOCYTES NFR BLD AUTO: 0.6 % — SIGNIFICANT CHANGE UP (ref 0–1.5)
INR BLD: 1 RATIO — SIGNIFICANT CHANGE UP (ref 0.88–1.16)
KETONES UR-MCNC: NEGATIVE — SIGNIFICANT CHANGE UP
LACTATE BLDV-MCNC: 1.1 MMOL/L — SIGNIFICANT CHANGE UP (ref 0.5–2)
LEUKOCYTE ESTERASE UR-ACNC: NEGATIVE — SIGNIFICANT CHANGE UP
LIPID PNL WITH DIRECT LDL SERPL: 59 MG/DL — SIGNIFICANT CHANGE UP
LYMPHOCYTES # BLD AUTO: 0.91 K/UL — LOW (ref 1–3.3)
LYMPHOCYTES # BLD AUTO: 8.6 % — LOW (ref 13–44)
MCHC RBC-ENTMCNC: 30.7 PG — SIGNIFICANT CHANGE UP (ref 27–34)
MCHC RBC-ENTMCNC: 33.3 GM/DL — SIGNIFICANT CHANGE UP (ref 32–36)
MCV RBC AUTO: 92 FL — SIGNIFICANT CHANGE UP (ref 80–100)
MONOCYTES # BLD AUTO: 0.74 K/UL — SIGNIFICANT CHANGE UP (ref 0–0.9)
MONOCYTES NFR BLD AUTO: 7 % — SIGNIFICANT CHANGE UP (ref 2–14)
NEUTROPHILS # BLD AUTO: 8.66 K/UL — HIGH (ref 1.8–7.4)
NEUTROPHILS NFR BLD AUTO: 81.6 % — HIGH (ref 43–77)
NITRITE UR-MCNC: NEGATIVE — SIGNIFICANT CHANGE UP
NON HDL CHOLESTEROL: 73 MG/DL — SIGNIFICANT CHANGE UP
NRBC # BLD: 0 /100 WBCS — SIGNIFICANT CHANGE UP (ref 0–0)
PCO2 BLDV: 55 MMHG — HIGH (ref 39–42)
PH BLDV: 7.24 — LOW (ref 7.32–7.43)
PH UR: 5.5 — SIGNIFICANT CHANGE UP (ref 5–8)
PLATELET # BLD AUTO: 259 K/UL — SIGNIFICANT CHANGE UP (ref 150–400)
PO2 BLDV: 53 MMHG — HIGH (ref 25–45)
POTASSIUM BLDV-SCNC: 3.8 MMOL/L — SIGNIFICANT CHANGE UP (ref 3.5–5.1)
POTASSIUM SERPL-MCNC: 4 MMOL/L — SIGNIFICANT CHANGE UP (ref 3.5–5.3)
POTASSIUM SERPL-SCNC: 4 MMOL/L — SIGNIFICANT CHANGE UP (ref 3.5–5.3)
PROT SERPL-MCNC: 7.7 G/DL — SIGNIFICANT CHANGE UP (ref 6–8.3)
PROT UR-MCNC: NEGATIVE — SIGNIFICANT CHANGE UP
PROTHROM AB SERPL-ACNC: 11.5 SEC — SIGNIFICANT CHANGE UP (ref 10.5–13.4)
RBC # BLD: 3.39 M/UL — LOW (ref 3.8–5.2)
RBC # FLD: 13.8 % — SIGNIFICANT CHANGE UP (ref 10.3–14.5)
RSV RNA NPH QL NAA+NON-PROBE: SIGNIFICANT CHANGE UP
SAO2 % BLDV: 83 % — SIGNIFICANT CHANGE UP (ref 67–88)
SARS-COV-2 RNA SPEC QL NAA+PROBE: SIGNIFICANT CHANGE UP
SODIUM SERPL-SCNC: 124 MMOL/L — LOW (ref 135–145)
SP GR SPEC: 1.01 — SIGNIFICANT CHANGE UP (ref 1.01–1.02)
TRIGL SERPL-MCNC: 69 MG/DL — SIGNIFICANT CHANGE UP
TROPONIN T, HIGH SENSITIVITY RESULT: 20 NG/L — SIGNIFICANT CHANGE UP (ref 0–51)
UROBILINOGEN FLD QL: NEGATIVE — SIGNIFICANT CHANGE UP
WBC # BLD: 10.6 K/UL — HIGH (ref 3.8–10.5)
WBC # FLD AUTO: 10.6 K/UL — HIGH (ref 3.8–10.5)

## 2022-08-28 PROCEDURE — 99285 EMERGENCY DEPT VISIT HI MDM: CPT | Mod: GC

## 2022-08-28 PROCEDURE — 70450 CT HEAD/BRAIN W/O DYE: CPT | Mod: 26,MA

## 2022-08-28 PROCEDURE — 71045 X-RAY EXAM CHEST 1 VIEW: CPT | Mod: 26

## 2022-08-28 RX ORDER — SODIUM CHLORIDE 9 MG/ML
1000 INJECTION INTRAMUSCULAR; INTRAVENOUS; SUBCUTANEOUS
Refills: 0 | Status: DISCONTINUED | OUTPATIENT
Start: 2022-08-28 | End: 2022-08-28

## 2022-08-28 RX ORDER — SODIUM CHLORIDE 9 MG/ML
500 INJECTION INTRAMUSCULAR; INTRAVENOUS; SUBCUTANEOUS ONCE
Refills: 0 | Status: COMPLETED | OUTPATIENT
Start: 2022-08-28 | End: 2022-08-28

## 2022-08-28 RX ADMIN — SODIUM CHLORIDE 125 MILLILITER(S): 9 INJECTION INTRAMUSCULAR; INTRAVENOUS; SUBCUTANEOUS at 21:15

## 2022-08-28 NOTE — ED ADULT NURSE REASSESSMENT NOTE - NS ED NURSE REASSESS COMMENT FT1
While in triage WR pt reassessed and pt states "something is wrong, My body is not right. I started with neck pain, numb and tingling to hands/feet." As per daughter upon speaking with her this evening she was confused( not her normal)." Pt ambulated with unsteady gait noted- swaying to both sides with rt>lt. Pt A/O x 2 at present ( confused to date/month). While in triage WR pt reassessed and pt states "something is wrong, My body is not right, also with neck pain since about 2200 last night. numb and tingling to hands/feet." As per daughter upon speaking with her this evening she was confused( not her normal)." Pt ambulated with unsteady gait noted- swaying to both sides with rt>lt. Pt A/O x 2 at present ( confused to date/month).

## 2022-08-28 NOTE — ED PROVIDER NOTE - CLINICAL SUMMARY MEDICAL DECISION MAKING FREE TEXT BOX
77 y/o F with PMHx of chronic back pain s/p 8 spinal surgeries on 60 mg of morphine daily and DM was BIBEMS for confusion and difficult walking. Pt is accompanied by daughter who called EMS. Pt last known normal was 3 days ago.  Pt usually ambulates normal without assistance and is cognitively intact and AOx3. Pt lives alone. Pt is repeating sentences and not making sense on exam. Daughter is concerned because these are all acute changes. Stroke code called on arrival to ED due to acute changes in mental status and motor function, stroke orders placed. Metabolic or infectious cause for confusion and difficulty ambulating are considered and labs ordered to r/o these causes. Medication adverse affect from opioids is possible cause of acute confusion in this pt. Dispo likely admit to medicine for AMS and determining potential infectious or metabolic source.

## 2022-08-28 NOTE — ED PROVIDER NOTE - PROGRESS NOTE DETAILS
Riddhi Rodriguez PGY1: Stroke code called on arrival. CT head noncon ordered and performed. Riddhi Rodriguez PGY1: Pt labs show hyponatremia. Admit to medicine for hyponatremia. urine lytes added and serum osm ordered  Patient endorsed to Dr. Cordoba at the time of admission. Based on patient's history and physical exam, as well as the results of today's workup, I feel that patient warrants admission to the hospital for further workup/evaluation and continued management. I discussed the findings of today's workup with the patient and addressed the patient's questions and concerns. The patient was agreeable with admission. Our team spoke with the inpatient receiving team who accepted the patient for admission and subsequently took over the patient's care at the time of admission. The receiving team will follow up on pending labs, analgesia, any clinical imaging results, ancillary findings, reassess, and disposition as clinically indicated. Details of patient and plan conveyed to receiving physician team and conveyed back for understanding. There were no questions at this time about the patient's status, disposition, and plan. Patient's care to be taken over by receiving physician team at this time, all decisions regarding the progression of care will be made at their discretion.

## 2022-08-28 NOTE — ED ADULT NURSE NOTE - IN THE PAST 12 MONTHS HAVE YOU USED DRUGS OTHER THAN THOSE REQUIRED FOR MEDICAL REASON?
Constitutional: no fever, no chills  Head: NC, AT   Eyes: no redness   ENMT: no nasal congestion/drainage, no sore throat   CV: +chest pain, no edema  Resp: no cough, no dyspnea  GI: no abdominal pain, no nausea, no vomiting, no diarrhea  : no dysuria, no hematuria   Skin: no lesions, no rashes   Neuro: no LOC, no headache, no sensory deficits, no weakness
No

## 2022-08-28 NOTE — ED PROVIDER NOTE - PHYSICAL EXAMINATION
Constitutional: Alert and orientedx3, well appearing, no apparent distress  HEENT: Atraumatic, PERRL, throat nonerythematous, no lesions  CV: RRR, S1, S2, no murmurs  Lungs: Clear and equal bilaterally, no wheezes, rales or crackles  Abdomen: Soft, nondistended, nontender  MSK: Normal ROM in all extremities, no deformities or erythema  Skin: Warm and dry. No rashes, lesions, bruising or erythema  Neuro: PERRL, some right eyelid drop, past pointing on right side normal on left, pt able to follow commands. 5/5 strength in all extremities. Normal sensation bilaterally. Pt unable to ambulate due to unsteadiness. Baseline is normal ambulation without assistance. Last known normal was 3 days ago but pt believes symptoms started last night. Pt is repeating sentences and at times not making sense on exam.  Lymph: No pitting edema in extremities. Constitutional: Alert and orientedx3, well appearing, no apparent distress  HEENT: Atraumatic, PERRL, throat nonerythematous, no lesions  CV: RRR, S1, S2, no murmurs  Lungs: Rales in all lung fields.   Abdomen: Soft, nondistended, nontender  MSK: Normal ROM in all extremities, no deformities or erythema  Skin: Warm and dry. No rashes, lesions, bruising or erythema  Neuro: PERRL, some right eyelid drop, past pointing on right side normal on left, pt able to follow commands. 5/5 strength in all extremities. Normal sensation bilaterally. Pt unable to ambulate due to unsteadiness. Baseline is normal ambulation without assistance. Last known normal was 3 days ago but pt believes symptoms started last night. Pt is repeating sentences and at times not making sense on exam.  Lymph: No pitting edema in extremities.

## 2022-08-28 NOTE — ED ADULT NURSE NOTE - NS ED NURSE LEVEL OF CONSCIOUSNESS ORIENTATION
Patient is a healthy 25yo F presenting with nasal congestion from home. She reports that she recently found out she had black mold in her home. She reports that over the past 2-3 days she has been having increased nasal congestion. She states that she had covid in the past so she is very concerned with any symptoms. She denies any increased rhinorrhea, fevers, chills, chest pain, sob, cough, dysphagia, or facial/neck pain.
Oriented - self; Oriented - place; Oriented - time

## 2022-08-28 NOTE — ED ADULT TRIAGE NOTE - OTHER COMPLAINTS
r/o code stroke- called at 2021. Pt with new AMS, neck pain, numbness/tingling x 4 extremities. + unsteady gait noted. Per daughter the confusion and unsteady gait are all new. pt directly to CT scan

## 2022-08-28 NOTE — CONSULT NOTE ADULT - SUBJECTIVE AND OBJECTIVE BOX
Neurology - Consult Note - 08-28-22  -  Peter Neely MD  PGY-3 Neurology  Spectra: [phone] ([facility]), [phone] ([facility])  -    Name: BANDAR RODRÍGUEZ; 76y (1945)    Reason for Admission:     Chief Complaint:     HPI:      Review of Systems:  INCOMPLETE   CONSTITUTIONAL: No fevers or chills  EYES/ENT: No visual changes or no throat pain   NECK: No pain or stiffness  RESPIRATORY: No hemoptysis or shortness of breath  CARDIOVASCULAR: No chest pain or palpitations  GASTROINTESTINAL: No melena or hematochezia  GENITOURINARY: No dysuria or hematuria  NEUROLOGICAL: +As stated in HPI above  SKIN: No itching, burning, rashes, or lesions   All other review of systems is negative unless indicated above.    Allergies:  Dilaudid (Other)  perfumes (Short breath)      PMHx:   HTN (hypertension)  Type 2 diabetes mellitus  Hypothyroid  Asthma  Spinal stenosis  Migraine  GERD (gastroesophageal reflux disease)  Chronic pain disorder  Anxiety  Anemia  History of fever  Lung nodule      PFHx:     PSuHx:   S/P appendectomy  S/P tonsillectomy  S/P D&amp;C (status post dilation and curettage)  H/O arthroscopy of knee  Status post cataract extraction  S/P lumbar laminectomy  Back pain  S/P lumbar laminectomy  S/P cervical discectomy  S/P laminectomy with spinal fusion  ROMEO (obstructive sleep apnea)  Spinal cord stimulator status  H/O cone biopsy of cervix        Medications:  MEDICATIONS  (STANDING):  sodium chloride 0.9%. 1000 milliLiter(s) (125 mL/Hr) IV Continuous <Continuous>    MEDICATIONS  (PRN):      Vitals:  T(C): 36.6 (08-28-22 @ 20:18), Max: 36.6 (08-28-22 @ 20:18)  HR: 92 (08-28-22 @ 20:18) (90 - 92)  BP: 128/56 (08-28-22 @ 20:18) (128/56 - 149/71)  RR: 18 (08-28-22 @ 20:18) (18 - 18)  SpO2: 95% (08-28-22 @ 20:18) (95% - 95%)    Physical Examination: INCOMPLETE  ***    Labs:          CAPILLARY BLOOD GLUCOSE      POCT Blood Glucose.: 104 mg/dL (28 Aug 2022 20:13)       Radiology:    PRIOR CT Head No Cont, CT cervical spine  (09.08.21 @ 19:40)    EXAM:  CT CERVICAL SPINE                        EXAM:  CT BRAIN                        PROCEDURE DATE:  09/08/2021    INTERPRETATION:  CLINICAL INDICATION: Fall, head injury.    TECHNIQUE: CT of the head and cervical spine was performed without the administration of intravenous contrast.    COMPARISON: PET CT 8/16/2017.    FINDINGS:    CT HEAD:  No acute transcortical infarct or intracranial hemorrhage.    The ventricles are normal without evidence of hydrocephalus. There are no extra-axial fluid collections.    Bilateral cataract surgery. Orbits are otherwise unremarkable. The imaged portions of the paranasal sinuses are clear. The mastoid air cells are clear. The visualized soft tissues and osseous structures appear normal.    CT CERVICAL SPINE:  Status post ACDF spanning C3-C7.  No acute fracture or acute subluxation.  No evidence of large disc protrusion or critical spinal stenosis. MRI may be obtained, if the patient is MRI compatible if further information regarding spinal canal soft tissue contents is required.    There is no prevertebral or paraspinal soft tissue swelling.    Biapical pleural scarring noted.    IMPRESSION:  CT head:  -No acute intracranial findings.    CT cervical spine:  -No acute fracture or dislocation.     Neurology - Consult Note - 22  -  Peter Neely MD  PGY-3 Neurology  Spectra: [phone] ([facility]), [phone] ([facility])  -    Name: BANDAR RODRÍGUEZ; 76y (1945)    Reason for Admission:     Chief Complaint:     HPI:  76 year old left-handed female w/ PMHx chronic back pain (s/p multiple spinal surgeries, including cervical ACDF and lumbar laminectomy, on morphine 60mg QD), HTN, T2DM, migraine, hypothyroid, Raynaud's, who presents for confusion and difficulty ambulating. Patient accompanied by her daughter, who called EMS. Patient's LKN over 3 days prior to arrival. Patient's daughter stating that she had not seen patient in about a week. However, today, patient noted to be repeating sentences, not making sense. Patient told her daughter that she was having difficulty walking since last night. Patient is normally AOx3, ambulates on her own, and is cognitively intact. Patient w/ tingling in b/l hands i/s/o Raynaud's. Patient recently started on new pain medication, but the name of the medication was unknown during this initial encounter.       Review of Systems: unreliable ROS.  CONSTITUTIONAL: No fevers or chills  EYES/ENT: No visual changes    NECK: +R neck pain  RESPIRATORY: No shortness of breath  CARDIOVASCULAR: No chest pain   GASTROINTESTINAL: No melena or hematochezia  GENITOURINARY: No dysuria or hematuria  NEUROLOGICAL: +As stated in HPI above  SKIN: No itching, burning, rashes, or lesions  All other review of systems is negative unless indicated above.    Allergies:  Dilaudid (Other)  perfumes (Short breath)      PMHx:   HTN (hypertension)  Type 2 diabetes mellitus  Hypothyroid  Asthma  Spinal stenosis  Migraine  GERD (gastroesophageal reflux disease)  Chronic pain disorder  Anxiety  Anemia  History of fever  Lung nodule      PFHx:     PSuHx:   S/P appendectomy  S/P tonsillectomy  S/P D&C (status post dilation and curettage)  H/O arthroscopy of knee  Status post cataract extraction  S/P lumbar laminectomy  Back pain  S/P lumbar laminectomy  S/P cervical discectomy  S/P laminectomy with spinal fusion  ROMEO (obstructive sleep apnea)  Spinal cord stimulator status  H/O cone biopsy of cervix        Medications:  MEDICATIONS  (STANDING):  sodium chloride 0.9%. 1000 milliLiter(s) (125 mL/Hr) IV Continuous <Continuous>    MEDICATIONS  (PRN):      Vitals:  T(C): 36.6 (22 @ 20:18), Max: 36.6 (22 @ 20:18)  HR: 92 (22 @ 20:18) (90 - 92)  BP: 128/56 (22 @ 20:18) (128/56 - 149/71)  RR: 18 (22 @ 20:18) (18 - 18)  SpO2: 95% (22 @ 20:18) (95% - 95%)    Physical Examination:      Constitutional: well-developed, well-groomed  Eyes: ophthalmoscopic exam deferred secondary to COVID-19 pandemic  Neck: tenderness to palpation at the right neck, range of motion grossly intact i/s/o anterior cervical discectomy and fusion.   Cardiovascular: no swelling, warm-to-touch    Neurological Examination:    - Mental Status: Eyes closed under bright lights in ED, otherwise awake and alert; oriented to person, age, month, year, and location (although, patient perseverating and also self-correcting when asked age and month, but corrects on her own); speech is fluent with intact naming, intact repetition, follows simple 1-step commands w/ some difficulty with 2-step midline crossing commands; moderate overall fund of knowledge (incorrect order of POTUS, knows capitol of [place] and [place], knows some of relevant past history, and vocabulary appropriate for level of education); immediate recall is 3/3 words and delayed recall is 0/3 words at 5 minutes and 0/5 with categorical clues; unable to spell WORLD backwards.      - Cranial Nerves:  II: Blinks to threat bilaterally; pupils are equal, round, and reactive to light   III, IV, VI: Extraocular movements are intact without nystagmus  V: Facial sensation is intact in the V1-V3 distribution bilaterally  VII: Face is symmetric with normal eye closure and smile  VIII: Hearing is intact to conversation  XII: Tongue protrudes in the midline    - Motor/Strength Testing:  - No drifts x 4 extremities                                   Right           Left  Biceps                      5                 5  Triceps                     5                 5  Hand                  5                 5  Hip Flex                   5                  5  Knee Ext	      5                  5  Dorsiflex                  5                 Does not follow command  Plantarflex               5                  5    - Normal muscle bulk and tone throughout.    - Reflexes:   Bicep (C5/C6):                  R 1+ --- L 1+   Brachioradialis (C5/C6) :   R 1+ --- L 1+   Patella (L3/L4) :                Patient not relaxing  Ankle (S1) :                      Patient not relaxing     - Plant responses down bilaterally.    - Sensory: Intact throughout to light touch and pinprick x4 extremities  - Coordination: Finger-nose-finger intact without ataxia or dysmetria, w/ mild tremor b/l.    - Gait: Unsteady gait.        Labs:      124<L>  |  89<L>  |  60<H>  ----------------------------<  114<H>  4.0   |  23  |  1.39<H>    Ca    9.9      28 Aug 2022 20:45    TPro  7.7  /  Alb  4.1  /  TBili  0.2  /  DBili  x   /  AST  19  /  ALT  14  /  AlkPhos  54                              10.4   10.60 )-----------( 259      ( 28 Aug 2022 20:45 )             31.2       PT/INR - ( 28 Aug 2022 20:45 )   PT: 11.5 sec;   INR: 1.00 ratio         PTT - ( 28 Aug 2022 20:45 )  PTT:31.7 sec    LIVER FUNCTIONS - ( 28 Aug 2022 20:45 )  Alb: 4.1 g/dL / Pro: 7.7 g/dL / ALK PHOS: 54 U/L / ALT: 14 U/L / AST: 19 U/L / GGT: x             Urinalysis Basic - ( 28 Aug 2022 22:12 )    Color: Yellow / Appearance: Clear / S.014 / pH: x  Gluc: x / Ketone: Negative  / Bili: Negative / Urobili: Negative   Blood: x / Protein: Negative / Nitrite: Negative   Leuk Esterase: Negative / RBC: x / WBC x   Sq Epi: x / Non Sq Epi: x / Bacteria: x             Radiology:    CT Head No Cont (22 @ 20:43)    ACC: [id number] EXAM:  CT BRAIN                          PROCEDURE DATE:  2022      INTERPRETATION:  CLINICAL INFORMATION: Generalized weakness and ataxia.   Initially concern for stroke however per neurology patient also has   asterixis and it is likely a metabolic etiology.    TECHNIQUE: Noncontrast axial CT images were acquired through the head.   Two-dimensional sagittal and coronal reformats were generated.    COMPARISON STUDY: CT head 2021    FINDINGS:    No acute intracranial hemorrhage, mass effect, or midline shift. No   abnormal extra-axial collections. The basal cisterns are patent without   evidence of central herniation.    Mild cerebral volume loss with proportional prominence of the sulci and   ventricles. Mild patchy periventricular white matter hypoattenuation,   likely the sequela of chronic microvascular changes.    The calvarium is intact. The soft tissues of the scalp are unremarkable.   The visualized paranasal sinuses are clear. The mastoid air cells and   middle ear cavities are clear. Bilateral lens replacements.      IMPRESSION:    No CT evidence of acute intracranial hemorrhage, mass effect, or midline   shift. Age-related involutional and microvascular changes.     ___________________  PRIOR CT Head No Cont, CT cervical spine  (21 @ 19:40)    EXAM:  CT CERVICAL SPINE                        EXAM:  CT BRAIN                        PROCEDURE DATE:  2021    INTERPRETATION:  CLINICAL INDICATION: Fall, head injury.    TECHNIQUE: CT of the head and cervical spine was performed without the administration of intravenous contrast.    COMPARISON: PET CT 2017.    FINDINGS:    CT HEAD:  No acute transcortical infarct or intracranial hemorrhage.    The ventricles are normal without evidence of hydrocephalus. There are no extra-axial fluid collections.    Bilateral cataract surgery. Orbits are otherwise unremarkable. The imaged portions of the paranasal sinuses are clear. The mastoid air cells are clear. The visualized soft tissues and osseous structures appear normal.    CT CERVICAL SPINE:  Status post ACDF spanning C3-C7.  No acute fracture or acute subluxation.  No evidence of large disc protrusion or critical spinal stenosis. MRI may be obtained, if the patient is MRI compatible if further information regarding spinal canal soft tissue contents is required.    There is no prevertebral or paraspinal soft tissue swelling.    Biapical pleural scarring noted.    IMPRESSION:  CT head:  -No acute intracranial findings.    CT cervical spine:  -No acute fracture or dislocation.     Neurology - Consult Note - 22  -  Peter Neely MD  PGY-3 Neurology  Spectra: [phone] ([facility]), [phone] ([facility])  -    Name: BANDAR RODRÍGUEZ; 76y (1945)    Reason for Admission:     Chief Complaint:     HPI:  76 year old left-handed female w/ PMHx chronic back pain (s/p multiple spinal surgeries, including cervical ACDF and lumbar laminectomy, on morphine 60mg QD), HTN, T2DM, migraine, hypothyroid, Raynaud's, who presents for confusion and difficulty ambulating. Patient accompanied by her daughter, who called EMS. Patient's LKN over 3 days prior to arrival. Patient's daughter stating that she had not seen patient in about a week. However, today, patient noted to be repeating sentences, not making sense. Patient told her daughter that she was having difficulty walking since last night. Patient is normally AOx3, ambulates on her own, and is cognitively intact. Patient w/ tingling in b/l hands i/s/o Raynaud's. Patient recently started on new pain medication, but the name of the medication was unknown during this initial encounter.       Review of Systems: unreliable ROS.  CONSTITUTIONAL: No fevers or chills  EYES/ENT: No visual changes    NECK: +R neck pain  RESPIRATORY: No shortness of breath  CARDIOVASCULAR: No chest pain   GASTROINTESTINAL: No melena or hematochezia  GENITOURINARY: No dysuria or hematuria  NEUROLOGICAL: +As stated in HPI above  SKIN: No itching, burning, rashes, or lesions  All other review of systems is negative unless indicated above.    Allergies:  Dilaudid (Other)  perfumes (Short breath)      PMHx:   HTN (hypertension)  Type 2 diabetes mellitus  Hypothyroid  Asthma  Spinal stenosis  Migraine  GERD (gastroesophageal reflux disease)  Chronic pain disorder  Anxiety  Anemia  History of fever  Lung nodule      PFHx:     PSuHx:   S/P appendectomy  S/P tonsillectomy  S/P D&C (status post dilation and curettage)  H/O arthroscopy of knee  Status post cataract extraction  S/P lumbar laminectomy  Back pain  S/P lumbar laminectomy  S/P cervical discectomy  S/P laminectomy with spinal fusion  ROMEO (obstructive sleep apnea)  Spinal cord stimulator status  H/O cone biopsy of cervix        Medications:  MEDICATIONS  (STANDING):  sodium chloride 0.9%. 1000 milliLiter(s) (125 mL/Hr) IV Continuous <Continuous>    MEDICATIONS  (PRN):      Vitals:  T(C): 36.6 (22 @ 20:18), Max: 36.6 (22 @ 20:18)  HR: 92 (22 @ 20:18) (90 - 92)  BP: 128/56 (22 @ 20:18) (128/56 - 149/71)  RR: 18 (22 @ 20:18) (18 - 18)  SpO2: 95% (22 @ 20:18) (95% - 95%)    Physical Examination:      Constitutional: well-developed, well-groomed  Eyes: ophthalmoscopic exam deferred secondary to COVID-19 pandemic  Neck: tenderness to palpation at the right neck, range of motion grossly intact i/s/o anterior cervical discectomy and fusion.   Cardiovascular: no swelling, warm-to-touch    Neurological Examination:    - Mental Status: Eyes closed under bright lights in ED, otherwise awake and alert; oriented to person, age, month, year, and location (although, patient perseverating and also self-correcting when asked age and month, but corrects on her own); speech is fluent with intact naming, intact repetition, follows simple 1-step commands w/ some difficulty with 2-step midline crossing commands; moderate overall fund of knowledge (incorrect order of POTUS, knows capitol of [place] and [place], knows some of relevant past history, and vocabulary appropriate for level of education); immediate recall is 3/3 words and delayed recall is 0/3 words at 5 minutes and 0/5 with categorical clues; unable to spell WORLD backwards.      - Cranial Nerves:  II: Blinks to threat bilaterally; pupils are equal, round, and reactive to light   III, IV, VI: Extraocular movements are intact without nystagmus  V: Facial sensation is intact in the V1-V3 distribution bilaterally  VII: Face is symmetric with normal eye closure and smile  VIII: Hearing is intact to conversation  XII: Tongue protrudes in the midline    - Motor/Strength Testing:  - No drifts x 4 extremities                                   Right           Left  Biceps                      5                 5  Triceps                     5                 5  Hand                  5                 5  Hip Flex                   5                  5  Knee Ext	      5                  5  Dorsiflex                  5                 Does not follow command  Plantarflex               5                  5    - Normal muscle bulk and tone throughout.    - Reflexes:   Bicep (C5/C6):                  R 1+ --- L 1+   Brachioradialis (C5/C6) :   R 1+ --- L 1+   Patella (L3/L4) :                Patient not relaxing  Ankle (S1) :                      Patient not relaxing     - Plant responses down bilaterally.    - Sensory: Intact throughout to light touch and pinprick x4 extremities  - Coordination: Finger-nose-finger intact without ataxia or dysmetria, w/ mild tremor b/l.    - Asterixis noted in b/l UE with arms outstretched.  - Gait: Unsteady gait.        Labs:      124<L>  |  89<L>  |  60<H>  ----------------------------<  114<H>  4.0   |  23  |  1.39<H>    Ca    9.9      28 Aug 2022 20:45    TPro  7.7  /  Alb  4.1  /  TBili  0.2  /  DBili  x   /  AST  19  /  ALT  14  /  AlkPhos  54                              10.4   10.60 )-----------( 259      ( 28 Aug 2022 20:45 )             31.2       PT/INR - ( 28 Aug 2022 20:45 )   PT: 11.5 sec;   INR: 1.00 ratio         PTT - ( 28 Aug 2022 20:45 )  PTT:31.7 sec    LIVER FUNCTIONS - ( 28 Aug 2022 20:45 )  Alb: 4.1 g/dL / Pro: 7.7 g/dL / ALK PHOS: 54 U/L / ALT: 14 U/L / AST: 19 U/L / GGT: x             Urinalysis Basic - ( 28 Aug 2022 22:12 )    Color: Yellow / Appearance: Clear / S.014 / pH: x  Gluc: x / Ketone: Negative  / Bili: Negative / Urobili: Negative   Blood: x / Protein: Negative / Nitrite: Negative   Leuk Esterase: Negative / RBC: x / WBC x   Sq Epi: x / Non Sq Epi: x / Bacteria: x             Radiology:    CT Head No Cont (22 @ 20:43)    ACC: [id number] EXAM:  CT BRAIN                          PROCEDURE DATE:  2022      INTERPRETATION:  CLINICAL INFORMATION: Generalized weakness and ataxia.   Initially concern for stroke however per neurology patient also has   asterixis and it is likely a metabolic etiology.    TECHNIQUE: Noncontrast axial CT images were acquired through the head.   Two-dimensional sagittal and coronal reformats were generated.    COMPARISON STUDY: CT head 2021    FINDINGS:    No acute intracranial hemorrhage, mass effect, or midline shift. No   abnormal extra-axial collections. The basal cisterns are patent without   evidence of central herniation.    Mild cerebral volume loss with proportional prominence of the sulci and   ventricles. Mild patchy periventricular white matter hypoattenuation,   likely the sequela of chronic microvascular changes.    The calvarium is intact. The soft tissues of the scalp are unremarkable.   The visualized paranasal sinuses are clear. The mastoid air cells and   middle ear cavities are clear. Bilateral lens replacements.      IMPRESSION:    No CT evidence of acute intracranial hemorrhage, mass effect, or midline   shift. Age-related involutional and microvascular changes.     ___________________  PRIOR CT Head No Cont, CT cervical spine  (21 @ 19:40)    EXAM:  CT CERVICAL SPINE                        EXAM:  CT BRAIN                        PROCEDURE DATE:  2021    INTERPRETATION:  CLINICAL INDICATION: Fall, head injury.    TECHNIQUE: CT of the head and cervical spine was performed without the administration of intravenous contrast.    COMPARISON: PET CT 2017.    FINDINGS:    CT HEAD:  No acute transcortical infarct or intracranial hemorrhage.    The ventricles are normal without evidence of hydrocephalus. There are no extra-axial fluid collections.    Bilateral cataract surgery. Orbits are otherwise unremarkable. The imaged portions of the paranasal sinuses are clear. The mastoid air cells are clear. The visualized soft tissues and osseous structures appear normal.    CT CERVICAL SPINE:  Status post ACDF spanning C3-C7.  No acute fracture or acute subluxation.  No evidence of large disc protrusion or critical spinal stenosis. MRI may be obtained, if the patient is MRI compatible if further information regarding spinal canal soft tissue contents is required.    There is no prevertebral or paraspinal soft tissue swelling.    Biapical pleural scarring noted.    IMPRESSION:  CT head:  -No acute intracranial findings.    CT cervical spine:  -No acute fracture or dislocation.     Neurology - Consult Note - 22  -  Peter Neely MD  PGY-3 Neurology  Spectra: [phone] ([facility]), [phone] ([facility])  -    Name: BANDAR RODRÍGUEZ; 76y (1945)    Reason for Admission:     Chief Complaint:     HPI:  76 year old left-handed female w/ PMHx chronic back pain (s/p multiple spinal surgeries, including cervical ACDF and lumbar laminectomy, on morphine 60mg QD), HTN, T2DM, migraine, hypothyroid, Raynaud's, who presents for confusion and difficulty ambulating. Patient accompanied by her daughter, who called EMS. Patient's LKN over 3 days prior to arrival. Patient's daughter stating that she had not seen patient in about a week. However, today, patient noted to be repeating sentences, not making sense. Patient told her daughter that she was having difficulty walking since last night. Patient is normally AOx3, ambulates on her own, and is cognitively intact. Patient w/ tingling in b/l hands i/s/o Raynaud's. Patient recently started on new pain medication, but the name of the medication was unknown during this initial encounter.       Review of Systems: unreliable ROS.  CONSTITUTIONAL: No fevers or chills  EYES/ENT: No visual changes    NECK: +R neck pain  RESPIRATORY: No shortness of breath  CARDIOVASCULAR: No chest pain   GASTROINTESTINAL: No melena or hematochezia  GENITOURINARY: No dysuria or hematuria  NEUROLOGICAL: +As stated in HPI above  SKIN: No itching, burning, rashes, or lesions  All other review of systems is negative unless indicated above.    Allergies:  Dilaudid (Other)  perfumes (Short breath)      PMHx:   HTN (hypertension)  Type 2 diabetes mellitus  Hypothyroid  Asthma  Spinal stenosis  Migraine  GERD (gastroesophageal reflux disease)  Chronic pain disorder  Anxiety  Anemia  History of fever  Lung nodule      PFHx:     PSuHx:   S/P appendectomy  S/P tonsillectomy  S/P D&C (status post dilation and curettage)  H/O arthroscopy of knee  Status post cataract extraction  S/P lumbar laminectomy  Back pain  S/P lumbar laminectomy  S/P cervical discectomy  S/P laminectomy with spinal fusion  ROMEO (obstructive sleep apnea)  Spinal cord stimulator status  H/O cone biopsy of cervix        Medications:  MEDICATIONS  (STANDING):  sodium chloride 0.9%. 1000 milliLiter(s) (125 mL/Hr) IV Continuous <Continuous>    MEDICATIONS  (PRN):      Vitals:  T(C): 36.6 (22 @ 20:18), Max: 36.6 (22 @ 20:18)  HR: 92 (22 @ 20:18) (90 - 92)  BP: 128/56 (22 @ 20:18) (128/56 - 149/71)  RR: 18 (22 @ 20:18) (18 - 18)  SpO2: 95% (22 @ 20:18) (95% - 95%)    Physical Examination:      Constitutional: well-developed, well-groomed  Eyes: ophthalmoscopic exam deferred secondary to COVID-19 pandemic  Neck: tenderness to palpation at the right neck, range of motion grossly intact i/s/o anterior cervical discectomy and fusion.   Cardiovascular: no swelling, warm-to-touch    Neurological Examination:    - Mental Status: Eyes closed under bright lights in ED, otherwise awake and alert; oriented to person, age, month, year, and location (although, patient perseverating and also self-correcting when asked age and month, but corrects on her own); speech is fluent with intact naming, intact repetition, follows simple 1-step commands w/ some difficulty with 2-step midline crossing commands; moderate overall fund of knowledge (incorrect order of POTUS, knows capitol of [place] and [place], knows some of relevant past history, and vocabulary appropriate for level of education); immediate recall is 3/3 words and delayed recall is 0/3 words at 5 minutes and 0/5 with categorical clues; unable to spell WORLD backwards.      - Cranial Nerves:  II: Blinks to threat bilaterally; pupils are equal, round, and reactive to light   III, IV, VI: Extraocular movements are intact without nystagmus  V: Facial sensation is intact in the V1-V3 distribution bilaterally  VII: Face is symmetric with normal eye closure and smile; mildly dysarthric  VIII: Hearing is intact to conversation  XII: Tongue protrudes in the midline    - Motor/Strength Testing:  - No drifts x 4 extremities                                   Right           Left  Biceps                      5                 5  Triceps                     5                 5  Hand                  5                 5  Hip Flex                   5                  5  Knee Ext	      5                  5  Dorsiflex                  5                 Does not follow command  Plantarflex               5                  5    - Normal muscle bulk and tone throughout.    - Reflexes:   Bicep (C5/C6):                  R 1+ --- L 1+   Brachioradialis (C5/C6) :   R 1+ --- L 1+   Patella (L3/L4) :                Patient not relaxing  Ankle (S1) :                      Patient not relaxing     - Plant responses down bilaterally.    - Sensory: Intact throughout to light touch and pinprick x4 extremities  - Coordination: Finger-nose-finger intact without ataxia or dysmetria, w/ mild tremor b/l.    - Asterixis noted in b/l UE with arms outstretched.  - Gait: Unsteady gait.        Labs:      124<L>  |  89<L>  |  60<H>  ----------------------------<  114<H>  4.0   |  23  |  1.39<H>    Ca    9.9      28 Aug 2022 20:45    TPro  7.7  /  Alb  4.1  /  TBili  0.2  /  DBili  x   /  AST  19  /  ALT  14  /  AlkPhos  54                              10.4   10.60 )-----------( 259      ( 28 Aug 2022 20:45 )             31.2       PT/INR - ( 28 Aug 2022 20:45 )   PT: 11.5 sec;   INR: 1.00 ratio         PTT - ( 28 Aug 2022 20:45 )  PTT:31.7 sec    LIVER FUNCTIONS - ( 28 Aug 2022 20:45 )  Alb: 4.1 g/dL / Pro: 7.7 g/dL / ALK PHOS: 54 U/L / ALT: 14 U/L / AST: 19 U/L / GGT: x             Urinalysis Basic - ( 28 Aug 2022 22:12 )    Color: Yellow / Appearance: Clear / S.014 / pH: x  Gluc: x / Ketone: Negative  / Bili: Negative / Urobili: Negative   Blood: x / Protein: Negative / Nitrite: Negative   Leuk Esterase: Negative / RBC: x / WBC x   Sq Epi: x / Non Sq Epi: x / Bacteria: x             Radiology:    CT Head No Cont (22 @ 20:43)    ACC: [id number] EXAM:  CT BRAIN                          PROCEDURE DATE:  2022      INTERPRETATION:  CLINICAL INFORMATION: Generalized weakness and ataxia.   Initially concern for stroke however per neurology patient also has   asterixis and it is likely a metabolic etiology.    TECHNIQUE: Noncontrast axial CT images were acquired through the head.   Two-dimensional sagittal and coronal reformats were generated.    COMPARISON STUDY: CT head 2021    FINDINGS:    No acute intracranial hemorrhage, mass effect, or midline shift. No   abnormal extra-axial collections. The basal cisterns are patent without   evidence of central herniation.    Mild cerebral volume loss with proportional prominence of the sulci and   ventricles. Mild patchy periventricular white matter hypoattenuation,   likely the sequela of chronic microvascular changes.    The calvarium is intact. The soft tissues of the scalp are unremarkable.   The visualized paranasal sinuses are clear. The mastoid air cells and   middle ear cavities are clear. Bilateral lens replacements.      IMPRESSION:    No CT evidence of acute intracranial hemorrhage, mass effect, or midline   shift. Age-related involutional and microvascular changes.     ___________________  PRIOR CT Head No Cont, CT cervical spine  (21 @ 19:40)    EXAM:  CT CERVICAL SPINE                        EXAM:  CT BRAIN                        PROCEDURE DATE:  2021    INTERPRETATION:  CLINICAL INDICATION: Fall, head injury.    TECHNIQUE: CT of the head and cervical spine was performed without the administration of intravenous contrast.    COMPARISON: PET CT 2017.    FINDINGS:    CT HEAD:  No acute transcortical infarct or intracranial hemorrhage.    The ventricles are normal without evidence of hydrocephalus. There are no extra-axial fluid collections.    Bilateral cataract surgery. Orbits are otherwise unremarkable. The imaged portions of the paranasal sinuses are clear. The mastoid air cells are clear. The visualized soft tissues and osseous structures appear normal.    CT CERVICAL SPINE:  Status post ACDF spanning C3-C7.  No acute fracture or acute subluxation.  No evidence of large disc protrusion or critical spinal stenosis. MRI may be obtained, if the patient is MRI compatible if further information regarding spinal canal soft tissue contents is required.    There is no prevertebral or paraspinal soft tissue swelling.    Biapical pleural scarring noted.    IMPRESSION:  CT head:  -No acute intracranial findings.    CT cervical spine:  -No acute fracture or dislocation.     Neurology - Consult Note - 22  -  Peter Neely MD  PGY-3 Neurology  Spectra: [id number] ([facility]), [id number] ([facility])  -    Name: BANDAR RODRÍGUEZ; 76y (1945)    Reason for Admission:     Chief Complaint: Altered mental status    HPI:  76 year old left-handed female w/ PMHx chronic back pain (s/p multiple spinal surgeries, including cervical ACDF and lumbar laminectomy, on morphine 60mg QD), HTN, T2DM, migraine, hypothyroid, Raynaud's, who presents for confusion and difficulty ambulating. Patient accompanied by her daughter, who called EMS. Patient's LKN over 3 days prior to arrival. Patient's daughter stating that she had not seen patient in about a week. However, today, patient noted to be repeating sentences, not making sense. Patient told her daughter that she was having difficulty walking since last night. Patient is normally AOx3, ambulates on her own, and is cognitively intact. Patient w/ tingling in b/l hands i/s/o Raynaud's. Patient recently started on new pain medication, but the name of the medication was unknown during this initial encounter.       Review of Systems: unreliable ROS.  CONSTITUTIONAL: No fevers or chills  EYES/ENT: No visual changes    NECK: +R neck pain  RESPIRATORY: No shortness of breath  CARDIOVASCULAR: No chest pain   GASTROINTESTINAL: No melena or hematochezia  GENITOURINARY: No dysuria or hematuria  NEUROLOGICAL: +As stated in HPI above  SKIN: No itching, burning, rashes, or lesions  All other review of systems is negative unless indicated above.    Allergies:  Dilaudid (Other)  perfumes (Short breath)      PMHx:   HTN (hypertension)  Type 2 diabetes mellitus  Hypothyroid  Asthma  Spinal stenosis  Migraine  GERD (gastroesophageal reflux disease)  Chronic pain disorder  Anxiety  Anemia  History of fever  Lung nodule      PFHx: Non-contributory    PSuHx:   S/P appendectomy  S/P tonsillectomy  S/P D&C (status post dilation and curettage)  H/O arthroscopy of knee  Status post cataract extraction  S/P lumbar laminectomy  Back pain  S/P lumbar laminectomy  S/P cervical discectomy  S/P laminectomy with spinal fusion  ROMEO (obstructive sleep apnea)  Spinal cord stimulator status  H/O cone biopsy of cervix    SHx: No reports of current tobacco, alcohol, or illicit drug use    Medications:  MEDICATIONS  (STANDING):  sodium chloride 0.9%. 1000 milliLiter(s) (125 mL/Hr) IV Continuous <Continuous>    MEDICATIONS  (PRN):      Vitals:  T(C): 36.6 (22 @ 20:18), Max: 36.6 (22 @ 20:18)  HR: 92 (22 @ 20:18) (90 - 92)  BP: 128/56 (22 @ 20:18) (128/56 - 149/71)  RR: 18 (22 @ 20:18) (18 - 18)  SpO2: 95% (22 @ 20:18) (95% - 95%)    Physical Examination:      Constitutional: well-developed, well-groomed  Eyes: ophthalmoscopic exam deferred secondary to COVID-19 pandemic  Neck: tenderness to palpation at the right neck, range of motion grossly intact i/s/o anterior cervical discectomy and fusion.   Cardiovascular: no swelling, warm-to-touch, peripheral pulses palpable    Neurological Examination:    - Mental Status: Eyes closed under bright lights in ED, otherwise awake and alert; oriented to person, age, month, year, and location (although, patient perseverating and also self-correcting when asked age and month, but corrects on her own); speech is fluent with intact naming, intact repetition, follows simple 1-step commands w/ some difficulty with 2-step midline crossing commands; moderate overall fund of knowledge (incorrect order of POTUS, knows capitol of [place] and [place], knows some of relevant past history, and vocabulary appropriate for level of education); immediate recall is 3/3 words and delayed recall is 0/3 words at 5 minutes and 0/5 with categorical clues; unable to spell WORLD backwards.      - Cranial Nerves:  II: Blinks to threat bilaterally; pupils are equal, round, and reactive to light   III, IV, VI: Extraocular movements are intact without nystagmus  V: Facial sensation is intact in the V1-V3 distribution bilaterally  VII: Face is symmetric with normal eye closure and smile; mildly dysarthric  VIII: Hearing is intact to conversation  XII: Tongue protrudes in the midline    - Motor/Strength Testing:  - No drifts x 4 extremities                                   Right           Left  Biceps                      5                 5  Triceps                     5                 5  Hand                  5                 5  Hip Flex                   5                  5  Knee Ext	      5                  5  Dorsiflex                  5                 Does not follow command  Plantarflex               5                  5    - Normal muscle bulk and tone throughout.    - Reflexes:   Bicep (C5/C6):                  R 1+ --- L 1+   Brachioradialis (C5/C6) :   R 1+ --- L 1+   Patella (L3/L4) :                Patient not relaxing  Ankle (S1) :                      Patient not relaxing     - Plant responses down bilaterally.    - Sensory: Intact throughout to light touch and pinprick x4 extremities  - Coordination: Finger-nose-finger intact without ataxia or dysmetria, w/ mild tremor b/l.    - Asterixis noted in b/l UE with arms outstretched.  - Gait: Unsteady gait.        Labs:      124<L>  |  89<L>  |  60<H>  ----------------------------<  114<H>  4.0   |  23  |  1.39<H>    Ca    9.9      28 Aug 2022 20:45    TPro  7.7  /  Alb  4.1  /  TBili  0.2  /  DBili  x   /  AST  19  /  ALT  14  /  AlkPhos  54                              10.4   10.60 )-----------( 259      ( 28 Aug 2022 20:45 )             31.2       PT/INR - ( 28 Aug 2022 20:45 )   PT: 11.5 sec;   INR: 1.00 ratio         PTT - ( 28 Aug 2022 20:45 )  PTT:31.7 sec    LIVER FUNCTIONS - ( 28 Aug 2022 20:45 )  Alb: 4.1 g/dL / Pro: 7.7 g/dL / ALK PHOS: 54 U/L / ALT: 14 U/L / AST: 19 U/L / GGT: x             Urinalysis Basic - ( 28 Aug 2022 22:12 )    Color: Yellow / Appearance: Clear / S.014 / pH: x  Gluc: x / Ketone: Negative  / Bili: Negative / Urobili: Negative   Blood: x / Protein: Negative / Nitrite: Negative   Leuk Esterase: Negative / RBC: x / WBC x   Sq Epi: x / Non Sq Epi: x / Bacteria: x             Radiology:    CT Head No Cont (22 @ 20:43)    ACC: [id number] EXAM:  CT BRAIN                          PROCEDURE DATE:  2022      INTERPRETATION:  CLINICAL INFORMATION: Generalized weakness and ataxia.   Initially concern for stroke however per neurology patient also has   asterixis and it is likely a metabolic etiology.    TECHNIQUE: Noncontrast axial CT images were acquired through the head.   Two-dimensional sagittal and coronal reformats were generated.    COMPARISON STUDY: CT head 2021    FINDINGS:    No acute intracranial hemorrhage, mass effect, or midline shift. No   abnormal extra-axial collections. The basal cisterns are patent without   evidence of central herniation.    Mild cerebral volume loss with proportional prominence of the sulci and   ventricles. Mild patchy periventricular white matter hypoattenuation,   likely the sequela of chronic microvascular changes.    The calvarium is intact. The soft tissues of the scalp are unremarkable.   The visualized paranasal sinuses are clear. The mastoid air cells and   middle ear cavities are clear. Bilateral lens replacements.      IMPRESSION:    No CT evidence of acute intracranial hemorrhage, mass effect, or midline   shift. Age-related involutional and microvascular changes.     ___________________  PRIOR CT Head No Cont, CT cervical spine  (21 @ 19:40)    EXAM:  CT CERVICAL SPINE                        EXAM:  CT BRAIN                        PROCEDURE DATE:  2021    INTERPRETATION:  CLINICAL INDICATION: Fall, head injury.    TECHNIQUE: CT of the head and cervical spine was performed without the administration of intravenous contrast.    COMPARISON: PET CT 2017.    FINDINGS:    CT HEAD:  No acute transcortical infarct or intracranial hemorrhage.    The ventricles are normal without evidence of hydrocephalus. There are no extra-axial fluid collections.    Bilateral cataract surgery. Orbits are otherwise unremarkable. The imaged portions of the paranasal sinuses are clear. The mastoid air cells are clear. The visualized soft tissues and osseous structures appear normal.    CT CERVICAL SPINE:  Status post ACDF spanning C3-C7.  No acute fracture or acute subluxation.  No evidence of large disc protrusion or critical spinal stenosis. MRI may be obtained, if the patient is MRI compatible if further information regarding spinal canal soft tissue contents is required.    There is no prevertebral or paraspinal soft tissue swelling.    Biapical pleural scarring noted.    IMPRESSION:  CT head:  -No acute intracranial findings.    CT cervical spine:  -No acute fracture or dislocation.

## 2022-08-28 NOTE — ED PROVIDER NOTE - OBJECTIVE STATEMENT
75 y/o F with PMHx of chronic back pain s/p 8 spinal surgeries on 60 mg of morphine daily and DM was BIBEMS for confusion and difficult walking. Pt is accompanied by daughter who called EMS. Pt last known normal was 3 days ago. Daughter was talking to pt when she noticed that the pt was repeating sentences and not making sense. Pt told her daughter she was having difficult walking since last night due to imbalance. Pt is normally AOx3, ambulates on her own and is cognitively intact. Pt is complaining of weakness, dizziness and tingling in both hands. Pt was recently started on new pain medication but pt is not sure name. Pt denies CP, SOB, abdominal pain, changes in vision, headaches, n/v/d/c, dysuria or syncope. 77 y/o F with PMHx of chronic back pain s/p 8 spinal surgeries on 60 mg of morphine daily and DM was BIBEMS for confusion and difficult walking. Pt is accompanied by daughter who called EMS. Pt last known normal was 3 days ago. Daughter was talking to pt when she noticed that the pt was repeating sentences and not making sense. Pt told her daughter she was having difficult walking since last night due to imbalance. Pt is normally AOx3, ambulates on her own and is cognitively intact. Pt is complaining of weakness, dizziness and tingling in both hands. Pt was recently started on new pain medication but pt is not sure name. Pt denies CP, SOB, abdominal pain, changes in vision, headaches, n/v/d/c, dysuria or syncope.  Code stroke called upon arrival.

## 2022-08-28 NOTE — ED ADULT NURSE REASSESSMENT NOTE - NS ED NURSE REASSESS COMMENT FT1
Patient straight cathed for urine using sterile technique. Second RN present to confirm sterility. Explained procedure as it was being done - Pt tolerated procedure well. Sterile specimens collected and sent to lab as ordered. Initial output of 300mL drained. Comfort and safety provided.

## 2022-08-28 NOTE — CONSULT NOTE ADULT - ATTENDING COMMENTS
HPI as per resident note, personally verified by me. Patient reports she still feels confused and has problems processing her thoughts and actions. Denies any focal neurologic deficits and no abnormal movements.    - Mental Status: AAO x 2.5 (8/2022 but not rest of date); speech is fluent with intact naming, intact repetition, frequent perseveration, no dysarthria, usually follows simple 1-step commands w/ some difficulty with 2-step midline crossing commands; dec overall fund of knowledge (incorrect order of POTUS, knows capitol of [place] and [place], knows some of relevant past history, and vocabulary appropriate for level of education); recent and remote memory poor as immediate recall is 3/3 words and delayed recall is 0/3 words at 5 minutes and 0/5 with categorical clues; attn/conc poor as unable to spell WORLD backwards.      - Cranial Nerves:  II: VFF; pupils are equal, round, and reactive to light   III, IV, VI: Extraocular movements are intact without nystagmus  V: Facial sensation is intact in the V1-V3 distribution bilaterally  VII: Face is symmetric with normal eye closure and smile; mildly dysarthric  VIII: Hearing is intact to conversation  IX/X: Symmetric palate elevation  XI: Trapezius 5/5 b/l  XII: Tongue protrudes in the midline    - Motor/Strength Testing:  - No drifts x 4 extremities                                   Right           Left  Biceps                      5                 5  Triceps                     5                 5  Hand                  5                 5  Hip Flex                   5                  5  Knee Ext	      5                  5  Dorsiflex                  5                 5  Plantarflex               5                  5    - Normal muscle bulk and tone throughout.    - Reflexes:   Bicep (C5/C6):                  R 1+ --- L 1+   Brachioradialis (C5/C6) :   R 1+ --- L 1+   Patella (L3/L4) :                Patient not relaxing  Ankle (S1) :                      Patient not relaxing     - Plant responses neutral bilaterally.    - Sensory: Intact throughout to light touch and pinprick x4 extremities  - Coordination: Finger-nose-finger intact without ataxia or dysmetria, and minimal postural w/ mild tremor b/l.  (-) asterixis  - Gait and station: Due to fall risk/safety concerns did not assess    A/P:  Encephalopathy  Gait abnormality  HTN  DM type 2  RENNY  Hypothyroidism    - Etiology is likely secondary to toxic/metabolic or systemic causes with recent RENNY but need to assess other causes such as inflammatory, infectious, or seizures. Less likely cerebrovascular given no focal neurologic deficits on exam  - rEEG to evaluate for focal slowing, epileptiform discharges, or seizures  - Labs and metabolic work-up as per resident note and primary team  - Continue to address above medical problems, as you are doing  - Will continue to follow patient with you HPI as per resident note, personally verified by me. Patient reports she still feels confused and has problems processing her thoughts and actions. Denies any focal neurologic deficits and no abnormal movements.    - Mental Status: AAO x 2.5 (8/2022 but not rest of date); speech is fluent with intact naming, intact repetition, frequent perseveration, no dysarthria, usually follows simple 1-step commands w/ some difficulty with 2-step midline crossing commands; dec overall fund of knowledge (incorrect order of POTUS, knows capitol of [place] and [place], knows some of relevant past history, and vocabulary appropriate for level of education); recent and remote memory poor as immediate recall is 3/3 words and delayed recall is 0/3 words at 5 minutes and 0/5 with categorical clues; attn/conc poor as unable to spell WORLD backwards.      - Cranial Nerves:  II: VFF; pupils are equal, round, and reactive to light   III, IV, VI: Extraocular movements are intact without nystagmus  V: Facial sensation is intact in the V1-V3 distribution bilaterally  VII: Face is symmetric with normal eye closure and smile; mildly dysarthric  VIII: Hearing is intact to conversation  IX/X: Symmetric palate elevation  XI: Trapezius 5/5 b/l  XII: Tongue protrudes in the midline    - Motor/Strength Testing:  - No drifts x 4 extremities                                   Right           Left  Biceps                      5                 5  Triceps                     5                 5  Hand                  5                 5  Hip Flex                   5                  5  Knee Ext	      5                  5  Dorsiflex                  5                 5  Plantarflex               5                  5    - Normal muscle bulk and tone throughout.    - Reflexes:   Bicep (C5/C6):                  R 1+ --- L 1+   Brachioradialis (C5/C6) :   R 1+ --- L 1+   Patella (L3/L4) :                Patient not relaxing  Ankle (S1) :                      Patient not relaxing     - Plant responses neutral bilaterally.    - Sensory: Intact throughout to light touch and pinprick x4 extremities  - Coordination: Finger-nose-finger intact without ataxia or dysmetria, and minimal postural w/ mild tremor b/l.  (-) asterixis  - Gait and station: Due to fall risk/safety concerns did not assess    A/P:  Encephalopathy  Gait abnormality  HTN  DM type 2  RENNY  Hypothyroidism  Hyponatremia (Na 124)    - Etiology is likely secondary to toxic/metabolic or systemic causes with recent RENNY but need to assess other causes such as inflammatory, infectious, or seizures. Less likely cerebrovascular given no focal neurologic deficits on exam  - rEEG to evaluate for focal slowing, epileptiform discharges, or seizures  - Labs and metabolic work-up as per resident note and primary team  - Continue to address above medical problems, as you are doing  - Will continue to follow patient with you

## 2022-08-28 NOTE — CONSULT NOTE ADULT - ASSESSMENT
76 year old left-handed female w/ PMHx chronic back pain (s/p multiple spinal surgeries, including cervical ACDF and lumbar laminectomy, on morphine 60mg QD), HTN, T2DM, migraine, hypothyroid, Raynaud's, who presents for confusion and difficulty ambulating. Patient accompanied by her daughter, who called EMS. Patient's LKN over 3 days prior to arrival. Patient's daughter stating that she had not seen patient in about a week. However, today, patient noted to be repeating sentences, not making sense. Patient told her daughter that she was having difficulty walking since last night. Patient is normally AOx3, ambulates on her own, and is cognitively intact. Patient w/ tingling in b/l hands i/s/o Raynaud's. Patient recently started on new pain medication, but the name of the medication was unknown during this initial encounter. Initial VS in ED: /56, HR 92, afebrile. Initial neurological examination: awake, alert, oriented, perseverations; difficulty w/ 2-step midline crossing commands, otherwise normal language; attention and concentration impaired; CN intact; motor intact; sensory intact; DTRs grossly intact; unsteady gait. Pertinent labs: WBC 10.6, HGB 10.4, Na 124, BUN 60, sCr 1.39, eGFR 39, ammonia 37, UA negative, EtOH negative. CTH 8/28: negative.      76 year old left-handed female w/ PMHx chronic back pain (s/p multiple spinal surgeries, including cervical ACDF and lumbar laminectomy, on morphine 60mg QD), HTN, T2DM, migraine, hypothyroid, Raynaud's, mRS 0 at baseline (A&Ox3, cognitively intact, ambulating independently), who presents for confusion and difficulty ambulating. LKN > 3 days PTA (patient's daughter had not been in contact with her mother for about 1 week). Today, patient repeating sentences and not making sense. Patient reporting difficulty walking since last night (8/27). Patient recently started on new pain medication, but the name of the medication was unknown during this initial encounter. Initial VS in ED: /56, HR 92, afebrile. Initial neurological examination: awake, alert, oriented, perseverations; difficulty w/ 2-step midline crossing commands, otherwise normal language; attention and concentration impaired; CN intact; motor intact; sensory intact; DTRs grossly intact; bilateral asterixis, unsteady gait. Pertinent labs: WBC 10.6, HGB 10.4, Na 124, BUN 60, sCr 1.39, eGFR 39, ammonia 37, UA negative, EtOH negative. CTH 8/28: negative.     mRS: 0  LKN: > 3 days ago  NIHSS: 0    Impression: Confusion associated with unsteady gait likely d/t diffuse cerebral dysfunction. Suspect metabolic encephalopathy given hyponatremia, evidence of kidney injury, and b/l UE asterixis on examination.     Recommendations:   [] No current indication for MRI brain at this time. Can obtain if does not clinically improve despite normalizing lab values.  [] Thyroid function panel.  [] Metabolic abnormality workup and management, as per primary team.  [] Chronic pain management, as per primary team.  [] Low threshold for obtaining routine ("awake and asleep") EEG.  [] No antiepileptic medications indicated at this time.    * Case and plan to be discussed with Neurology Attending Dr. Berry * 76 year old left-handed female w/ PMHx chronic back pain (s/p multiple spinal surgeries, including cervical ACDF and lumbar laminectomy, on morphine 60mg QD), HTN, T2DM, migraine, hypothyroid, Raynaud's, mRS 0 at baseline (A&Ox3, cognitively intact, ambulating independently), who presents for confusion and difficulty ambulating. LKN > 3 days PTA (patient's daughter had not been in contact with her mother for about 1 week). Today, patient repeating sentences and not making sense. Patient reporting difficulty walking since last night (8/27). Patient recently started on new pain medication, but the name of the medication was unknown during this initial encounter. Initial VS in ED: /56, HR 92, afebrile. Initial neurological examination: awake, alert, oriented, perseverations; difficulty w/ 2-step midline crossing commands, otherwise normal language; attention and concentration impaired; CN intact; motor intact; sensory intact; DTRs grossly intact; bilateral asterixis, unsteady gait. Pertinent labs: WBC 10.6, HGB 10.4, Na 124, BUN 60, sCr 1.39, eGFR 39, ammonia 37, UA negative, EtOH negative. CTH 8/28: negative.     mRS: 0  LKN: > 3 days ago  NIHSS: 1 (mild dysarthria)    Impression: Confusion a/w unsteady gait and mild dysarthria likely d/t diffuse cerebral dysfunction. Suspect metabolic encephalopathy given hyponatremia, evidence of kidney injury, and b/l UE asterixis on examination.     Recommendations:   [] No current indication for MRI brain at this time. Can obtain if does not clinically improve despite normalizing lab values.  [] Thyroid function panel.  [] Metabolic abnormality workup and management, as per primary team.  [] Chronic pain management, as per primary team.  [] Low threshold for obtaining routine ("awake and asleep") EEG.  [] No antiepileptic medications indicated at this time.    * Case and plan to be discussed with Neurology Attending Dr. Berry * 76 year old left-handed female w/ PMHx chronic back pain (s/p multiple spinal surgeries, including cervical ACDF and lumbar laminectomy, on morphine 60mg QD), HTN, T2DM, migraine, hypothyroid, Raynaud's, mRS 0 at baseline (A&Ox3, cognitively intact, ambulating independently), who presents for confusion and difficulty ambulating. LKN > 3 days PTA (patient's daughter had not been in contact with her mother for about 1 week). Today, patient repeating sentences and not making sense. Patient reporting difficulty walking since last night (8/27). Patient recently started on new pain medication, but the name of the medication was unknown during this initial encounter. Initial VS in ED: /56, HR 92, afebrile. Initial neurological examination: awake, alert, oriented, perseverations; difficulty w/ 2-step midline crossing commands, otherwise normal language; attention and concentration impaired; CN intact; motor intact; sensory intact; DTRs grossly intact; bilateral asterixis, unsteady gait. Pertinent labs: WBC 10.6, HGB 10.4, Na 124, BUN 60, sCr 1.39, eGFR 39, ammonia 37, UA negative, EtOH negative. CTH 8/28: negative.     mRS: 0  LKN: > 3 days ago  NIHSS: 1 (mild dysarthria)    Impression: Confusion a/w unsteady gait and mild dysarthria likely d/t diffuse cerebral dysfunction. Suspect metabolic encephalopathy given hyponatremia, evidence of kidney injury, and b/l UE asterixis on examination.     Recommendations:   [] Routine ("awake and asleep") EEG in AM.  [] No antiepileptic medications indicated at this time.  [] No current indication for MRI brain at this time. Can obtain if does not clinically improve despite normalizing lab values.  [] Thyroid function panel.  [] Metabolic abnormality workup and management, as per primary team.  [] Chronic pain management, as per primary team.      * Case and plan to be discussed with Neurology Attending Dr. Berry *

## 2022-08-28 NOTE — ED ADULT NURSE NOTE - OBJECTIVE STATEMENT
76yF, A&Ox2, disoriented to month and day, PMHx of chronic back pain s/p 8 spinal surgeries on 60 mg of morphine daily and DM BIBEMS for confusion and difficult walking. Pt is accompanied by daughter who called EMS. Pt last known normal is unknown. Daughter was talking to pt when she noticed that the pt was repeating sentences and not making sense. Pt told her daughter she was having difficult walking since last night due to loss of balance. Pt is normally A&Ox4, ambulates on her own and is cognitively intact. Pt is complaining of weakness, dizziness and tingling in both hands. Pt was recently started on new pain medication but pt is not sure name. Pt denies CP, SOB, abdominal pain, changes in vision, headaches, n/v/d/c, dysuria or syncope.  Code stroke called in triage. Code stroke cancelled at CT scan

## 2022-08-28 NOTE — ED PROVIDER NOTE - ATTENDING CONTRIBUTION TO CARE
See MDM above.  Will follow up on labs, analgesia, imaging, reassess and disposition to the inpatient team as clinically indicated.  *The above represents an initial assessment/impression. Please refer to my progress notes below for potential changes in patient clinical course*

## 2022-08-29 DIAGNOSIS — G93.41 METABOLIC ENCEPHALOPATHY: ICD-10-CM

## 2022-08-29 DIAGNOSIS — K21.9 GASTRO-ESOPHAGEAL REFLUX DISEASE WITHOUT ESOPHAGITIS: ICD-10-CM

## 2022-08-29 DIAGNOSIS — I10 ESSENTIAL (PRIMARY) HYPERTENSION: ICD-10-CM

## 2022-08-29 DIAGNOSIS — J45.909 UNSPECIFIED ASTHMA, UNCOMPLICATED: ICD-10-CM

## 2022-08-29 DIAGNOSIS — Z87.898 PERSONAL HISTORY OF OTHER SPECIFIED CONDITIONS: ICD-10-CM

## 2022-08-29 DIAGNOSIS — G89.29 OTHER CHRONIC PAIN: ICD-10-CM

## 2022-08-29 DIAGNOSIS — E03.9 HYPOTHYROIDISM, UNSPECIFIED: ICD-10-CM

## 2022-08-29 DIAGNOSIS — R91.8 OTHER NONSPECIFIC ABNORMAL FINDING OF LUNG FIELD: ICD-10-CM

## 2022-08-29 DIAGNOSIS — Z98.890 OTHER SPECIFIED POSTPROCEDURAL STATES: ICD-10-CM

## 2022-08-29 DIAGNOSIS — G89.4 CHRONIC PAIN SYNDROME: ICD-10-CM

## 2022-08-29 DIAGNOSIS — Z86.79 PERSONAL HISTORY OF OTHER DISEASES OF THE CIRCULATORY SYSTEM: ICD-10-CM

## 2022-08-29 DIAGNOSIS — E87.1 HYPO-OSMOLALITY AND HYPONATREMIA: ICD-10-CM

## 2022-08-29 DIAGNOSIS — N17.9 ACUTE KIDNEY FAILURE, UNSPECIFIED: ICD-10-CM

## 2022-08-29 DIAGNOSIS — Z29.9 ENCOUNTER FOR PROPHYLACTIC MEASURES, UNSPECIFIED: ICD-10-CM

## 2022-08-29 LAB
ALBUMIN SERPL ELPH-MCNC: 3.5 G/DL — SIGNIFICANT CHANGE UP (ref 3.3–5)
ALP SERPL-CCNC: 48 U/L — SIGNIFICANT CHANGE UP (ref 40–120)
ALT FLD-CCNC: 12 U/L — SIGNIFICANT CHANGE UP (ref 10–45)
AMPHET UR-MCNC: NEGATIVE — SIGNIFICANT CHANGE UP
ANION GAP SERPL CALC-SCNC: 17 MMOL/L — SIGNIFICANT CHANGE UP (ref 5–17)
ANION GAP SERPL CALC-SCNC: 9 MMOL/L — SIGNIFICANT CHANGE UP (ref 5–17)
AST SERPL-CCNC: 20 U/L — SIGNIFICANT CHANGE UP (ref 10–40)
BARBITURATES UR SCN-MCNC: NEGATIVE — SIGNIFICANT CHANGE UP
BASE EXCESS BLDV CALC-SCNC: -1.4 MMOL/L — SIGNIFICANT CHANGE UP (ref -2–3)
BASOPHILS # BLD AUTO: 0.04 K/UL — SIGNIFICANT CHANGE UP (ref 0–0.2)
BASOPHILS NFR BLD AUTO: 0.5 % — SIGNIFICANT CHANGE UP (ref 0–2)
BENZODIAZ UR-MCNC: NEGATIVE — SIGNIFICANT CHANGE UP
BILIRUB SERPL-MCNC: 0.1 MG/DL — LOW (ref 0.2–1.2)
BLD GP AB SCN SERPL QL: NEGATIVE — SIGNIFICANT CHANGE UP
BLOOD GAS VENOUS - CREATININE: SIGNIFICANT CHANGE UP MG/DL (ref 0.5–1.3)
BUN SERPL-MCNC: 38 MG/DL — HIGH (ref 7–23)
BUN SERPL-MCNC: 53 MG/DL — HIGH (ref 7–23)
CA-I SERPL-SCNC: 1.28 MMOL/L — SIGNIFICANT CHANGE UP (ref 1.15–1.33)
CALCIUM SERPL-MCNC: 9.4 MG/DL — SIGNIFICANT CHANGE UP (ref 8.4–10.5)
CALCIUM SERPL-MCNC: 9.8 MG/DL — SIGNIFICANT CHANGE UP (ref 8.4–10.5)
CHLORIDE BLDV-SCNC: 92 MMOL/L — LOW (ref 96–108)
CHLORIDE SERPL-SCNC: 92 MMOL/L — LOW (ref 96–108)
CHLORIDE SERPL-SCNC: 96 MMOL/L — SIGNIFICANT CHANGE UP (ref 96–108)
CHLORIDE UR-SCNC: <20 MMOL/L — SIGNIFICANT CHANGE UP
CO2 BLDV-SCNC: 29 MMOL/L — HIGH (ref 22–26)
CO2 SERPL-SCNC: 16 MMOL/L — LOW (ref 22–31)
CO2 SERPL-SCNC: 24 MMOL/L — SIGNIFICANT CHANGE UP (ref 22–31)
COCAINE METAB.OTHER UR-MCNC: NEGATIVE — SIGNIFICANT CHANGE UP
CREAT ?TM UR-MCNC: 63 MG/DL — SIGNIFICANT CHANGE UP
CREAT SERPL-MCNC: 0.98 MG/DL — SIGNIFICANT CHANGE UP (ref 0.5–1.3)
CREAT SERPL-MCNC: 1.24 MG/DL — SIGNIFICANT CHANGE UP (ref 0.5–1.3)
EGFR: 45 ML/MIN/1.73M2 — LOW
EGFR: 59 ML/MIN/1.73M2 — LOW
EOSINOPHIL # BLD AUTO: 0.19 K/UL — SIGNIFICANT CHANGE UP (ref 0–0.5)
EOSINOPHIL NFR BLD AUTO: 2.2 % — SIGNIFICANT CHANGE UP (ref 0–6)
GAS PNL BLDV: 123 MMOL/L — LOW (ref 136–145)
GAS PNL BLDV: SIGNIFICANT CHANGE UP
GAS PNL BLDV: SIGNIFICANT CHANGE UP
GLUCOSE BLDC GLUCOMTR-MCNC: 144 MG/DL — HIGH (ref 70–99)
GLUCOSE BLDV-MCNC: 111 MG/DL — HIGH (ref 70–99)
GLUCOSE SERPL-MCNC: 112 MG/DL — HIGH (ref 70–99)
GLUCOSE SERPL-MCNC: 126 MG/DL — HIGH (ref 70–99)
HCO3 BLDV-SCNC: 27 MMOL/L — SIGNIFICANT CHANGE UP (ref 22–29)
HCT VFR BLD CALC: 27.1 % — LOW (ref 34.5–45)
HCT VFR BLDA CALC: 35 % — SIGNIFICANT CHANGE UP (ref 34.5–46.5)
HGB BLD CALC-MCNC: 11.7 G/DL — SIGNIFICANT CHANGE UP (ref 11.7–16.1)
HGB BLD-MCNC: 9.1 G/DL — LOW (ref 11.5–15.5)
IMM GRANULOCYTES NFR BLD AUTO: 0.5 % — SIGNIFICANT CHANGE UP (ref 0–1.5)
LACTATE BLDV-MCNC: 0.7 MMOL/L — SIGNIFICANT CHANGE UP (ref 0.5–2)
LYMPHOCYTES # BLD AUTO: 1.12 K/UL — SIGNIFICANT CHANGE UP (ref 1–3.3)
LYMPHOCYTES # BLD AUTO: 13 % — SIGNIFICANT CHANGE UP (ref 13–44)
MAGNESIUM SERPL-MCNC: 2.6 MG/DL — SIGNIFICANT CHANGE UP (ref 1.6–2.6)
MCHC RBC-ENTMCNC: 30.8 PG — SIGNIFICANT CHANGE UP (ref 27–34)
MCHC RBC-ENTMCNC: 33.6 GM/DL — SIGNIFICANT CHANGE UP (ref 32–36)
MCV RBC AUTO: 91.9 FL — SIGNIFICANT CHANGE UP (ref 80–100)
METHADONE UR-MCNC: NEGATIVE — SIGNIFICANT CHANGE UP
MONOCYTES # BLD AUTO: 0.65 K/UL — SIGNIFICANT CHANGE UP (ref 0–0.9)
MONOCYTES NFR BLD AUTO: 7.5 % — SIGNIFICANT CHANGE UP (ref 2–14)
NEUTROPHILS # BLD AUTO: 6.57 K/UL — SIGNIFICANT CHANGE UP (ref 1.8–7.4)
NEUTROPHILS NFR BLD AUTO: 76.3 % — SIGNIFICANT CHANGE UP (ref 43–77)
NRBC # BLD: 0 /100 WBCS — SIGNIFICANT CHANGE UP (ref 0–0)
OPIATES UR-MCNC: POSITIVE
OSMOLALITY SERPL: 279 MOSMOL/KG — LOW (ref 280–301)
OSMOLALITY UR: 316 MOS/KG — SIGNIFICANT CHANGE UP (ref 300–900)
OXYCODONE UR-MCNC: NEGATIVE — SIGNIFICANT CHANGE UP
PCO2 BLDV: 61 MMHG — HIGH (ref 39–42)
PCP SPEC-MCNC: SIGNIFICANT CHANGE UP
PCP UR-MCNC: NEGATIVE — SIGNIFICANT CHANGE UP
PH BLDV: 7.25 — LOW (ref 7.32–7.43)
PHOSPHATE SERPL-MCNC: 4.2 MG/DL — SIGNIFICANT CHANGE UP (ref 2.5–4.5)
PLATELET # BLD AUTO: 247 K/UL — SIGNIFICANT CHANGE UP (ref 150–400)
PO2 BLDV: 29 MMHG — SIGNIFICANT CHANGE UP (ref 25–45)
POTASSIUM BLDV-SCNC: 3.8 MMOL/L — SIGNIFICANT CHANGE UP (ref 3.5–5.1)
POTASSIUM SERPL-MCNC: 3.9 MMOL/L — SIGNIFICANT CHANGE UP (ref 3.5–5.3)
POTASSIUM SERPL-MCNC: 4.2 MMOL/L — SIGNIFICANT CHANGE UP (ref 3.5–5.3)
POTASSIUM SERPL-SCNC: 3.9 MMOL/L — SIGNIFICANT CHANGE UP (ref 3.5–5.3)
POTASSIUM SERPL-SCNC: 4.2 MMOL/L — SIGNIFICANT CHANGE UP (ref 3.5–5.3)
PROT SERPL-MCNC: 6.8 G/DL — SIGNIFICANT CHANGE UP (ref 6–8.3)
RBC # BLD: 2.95 M/UL — LOW (ref 3.8–5.2)
RBC # FLD: 14 % — SIGNIFICANT CHANGE UP (ref 10.3–14.5)
RH IG SCN BLD-IMP: POSITIVE — SIGNIFICANT CHANGE UP
SAO2 % BLDV: 43.6 % — LOW (ref 67–88)
SODIUM SERPL-SCNC: 125 MMOL/L — LOW (ref 135–145)
SODIUM SERPL-SCNC: 129 MMOL/L — LOW (ref 135–145)
SODIUM UR-SCNC: 12 MMOL/L — SIGNIFICANT CHANGE UP
T4 AB SER-ACNC: 4 UG/DL — LOW (ref 4.6–12)
THC UR QL: NEGATIVE — SIGNIFICANT CHANGE UP
TSH SERPL-MCNC: 1.94 UIU/ML — SIGNIFICANT CHANGE UP (ref 0.27–4.2)
UUN UR-MCNC: 509 MG/DL — SIGNIFICANT CHANGE UP
WBC # BLD: 8.61 K/UL — SIGNIFICANT CHANGE UP (ref 3.8–10.5)
WBC # FLD AUTO: 8.61 K/UL — SIGNIFICANT CHANGE UP (ref 3.8–10.5)

## 2022-08-29 PROCEDURE — 99223 1ST HOSP IP/OBS HIGH 75: CPT | Mod: GC

## 2022-08-29 PROCEDURE — 12345: CPT | Mod: NC,GC

## 2022-08-29 RX ORDER — MORPHINE SULFATE 50 MG/1
15 CAPSULE, EXTENDED RELEASE ORAL EVERY 6 HOURS
Refills: 0 | Status: DISCONTINUED | OUTPATIENT
Start: 2022-08-29 | End: 2022-08-30

## 2022-08-29 RX ORDER — FOLIC ACID 0.8 MG
1 TABLET ORAL
Qty: 0 | Refills: 0 | DISCHARGE

## 2022-08-29 RX ORDER — MORPHINE SULFATE 50 MG/1
4 CAPSULE, EXTENDED RELEASE ORAL ONCE
Refills: 0 | Status: DISCONTINUED | OUTPATIENT
Start: 2022-08-29 | End: 2022-08-29

## 2022-08-29 RX ORDER — AMLODIPINE BESYLATE 2.5 MG/1
1 TABLET ORAL
Qty: 0 | Refills: 0 | DISCHARGE

## 2022-08-29 RX ORDER — SODIUM CHLORIDE 9 MG/ML
1000 INJECTION INTRAMUSCULAR; INTRAVENOUS; SUBCUTANEOUS ONCE
Refills: 0 | Status: COMPLETED | OUTPATIENT
Start: 2022-08-29 | End: 2022-08-29

## 2022-08-29 RX ORDER — MORPHINE SULFATE 50 MG/1
30 CAPSULE, EXTENDED RELEASE ORAL ONCE
Refills: 0 | Status: DISCONTINUED | OUTPATIENT
Start: 2022-08-29 | End: 2022-08-29

## 2022-08-29 RX ORDER — HEPARIN SODIUM 5000 [USP'U]/ML
5000 INJECTION INTRAVENOUS; SUBCUTANEOUS EVERY 8 HOURS
Refills: 0 | Status: DISCONTINUED | OUTPATIENT
Start: 2022-08-29 | End: 2022-08-31

## 2022-08-29 RX ORDER — LOSARTAN/HYDROCHLOROTHIAZIDE 100MG-25MG
1 TABLET ORAL
Qty: 0 | Refills: 0 | DISCHARGE

## 2022-08-29 RX ORDER — LOSARTAN POTASSIUM 100 MG/1
1 TABLET, FILM COATED ORAL
Qty: 0 | Refills: 0 | DISCHARGE

## 2022-08-29 RX ORDER — FEXOFENADINE HCL 30 MG
1 TABLET ORAL
Qty: 0 | Refills: 0 | DISCHARGE

## 2022-08-29 RX ORDER — CHOLECALCIFEROL (VITAMIN D3) 125 MCG
1 CAPSULE ORAL
Qty: 0 | Refills: 0 | DISCHARGE

## 2022-08-29 RX ORDER — FAMOTIDINE 10 MG/ML
1 INJECTION INTRAVENOUS
Qty: 0 | Refills: 0 | DISCHARGE

## 2022-08-29 RX ORDER — MONTELUKAST 4 MG/1
10 TABLET, CHEWABLE ORAL DAILY
Refills: 0 | Status: DISCONTINUED | OUTPATIENT
Start: 2022-08-29 | End: 2022-08-31

## 2022-08-29 RX ORDER — CYCLOBENZAPRINE HYDROCHLORIDE 10 MG/1
5 TABLET, FILM COATED ORAL THREE TIMES A DAY
Refills: 0 | Status: DISCONTINUED | OUTPATIENT
Start: 2022-08-29 | End: 2022-08-29

## 2022-08-29 RX ORDER — FAMOTIDINE 10 MG/ML
20 INJECTION INTRAVENOUS
Refills: 0 | Status: DISCONTINUED | OUTPATIENT
Start: 2022-08-29 | End: 2022-08-30

## 2022-08-29 RX ORDER — FOLIC ACID 0.8 MG
1 TABLET ORAL DAILY
Refills: 0 | Status: DISCONTINUED | OUTPATIENT
Start: 2022-08-29 | End: 2022-08-31

## 2022-08-29 RX ORDER — AMLODIPINE BESYLATE 2.5 MG/1
10 TABLET ORAL DAILY
Refills: 0 | Status: DISCONTINUED | OUTPATIENT
Start: 2022-08-29 | End: 2022-08-29

## 2022-08-29 RX ORDER — AMLODIPINE BESYLATE 2.5 MG/1
10 TABLET ORAL DAILY
Refills: 0 | Status: DISCONTINUED | OUTPATIENT
Start: 2022-08-29 | End: 2022-08-31

## 2022-08-29 RX ORDER — LEVOTHYROXINE SODIUM 125 MCG
88 TABLET ORAL DAILY
Refills: 0 | Status: DISCONTINUED | OUTPATIENT
Start: 2022-08-29 | End: 2022-08-31

## 2022-08-29 RX ADMIN — AMLODIPINE BESYLATE 10 MILLIGRAM(S): 2.5 TABLET ORAL at 07:06

## 2022-08-29 RX ADMIN — Medication 88 MICROGRAM(S): at 07:06

## 2022-08-29 RX ADMIN — HEPARIN SODIUM 5000 UNIT(S): 5000 INJECTION INTRAVENOUS; SUBCUTANEOUS at 07:08

## 2022-08-29 RX ADMIN — SODIUM CHLORIDE 500 MILLILITER(S): 9 INJECTION INTRAMUSCULAR; INTRAVENOUS; SUBCUTANEOUS at 02:32

## 2022-08-29 RX ADMIN — SODIUM CHLORIDE 1000 MILLILITER(S): 9 INJECTION INTRAMUSCULAR; INTRAVENOUS; SUBCUTANEOUS at 11:02

## 2022-08-29 RX ADMIN — HEPARIN SODIUM 5000 UNIT(S): 5000 INJECTION INTRAVENOUS; SUBCUTANEOUS at 13:36

## 2022-08-29 RX ADMIN — FAMOTIDINE 20 MILLIGRAM(S): 10 INJECTION INTRAVENOUS at 17:34

## 2022-08-29 RX ADMIN — HEPARIN SODIUM 5000 UNIT(S): 5000 INJECTION INTRAVENOUS; SUBCUTANEOUS at 22:50

## 2022-08-29 RX ADMIN — MORPHINE SULFATE 4 MILLIGRAM(S): 50 CAPSULE, EXTENDED RELEASE ORAL at 22:23

## 2022-08-29 RX ADMIN — MORPHINE SULFATE 4 MILLIGRAM(S): 50 CAPSULE, EXTENDED RELEASE ORAL at 19:55

## 2022-08-29 RX ADMIN — FAMOTIDINE 20 MILLIGRAM(S): 10 INJECTION INTRAVENOUS at 07:06

## 2022-08-29 RX ADMIN — MONTELUKAST 10 MILLIGRAM(S): 4 TABLET, CHEWABLE ORAL at 11:02

## 2022-08-29 NOTE — PROGRESS NOTE ADULT - PROBLEM SELECTOR PLAN 9
- h/o raynauds with cyanotic and cold UE digits BL  - continue with hot packs PRN while pt is awake  - CTM - TSH nml  - CW synthroid

## 2022-08-29 NOTE — H&P ADULT - PROBLEM SELECTOR PLAN 1
- metabolic encephalopathy possibly 2/2 metabolic vs. infectious vs. drug induced   - most likely 2/2 hyponatremia vs. opoid induced hyponatremia   - CTH unremarkable, Ammonia wnl, alcohol level wnl, +acidotic, hypoNatremic 124, hypochloremic 89  - negative code stroke per neurology   - f/u repeat CBC, CMP, TSH/T4, VBG, and inflammatory markers, HIV - metabolic encephalopathy possibly 2/2 metabolic vs. infectious vs. drug induced   - most likely 2/2 hyponatremia vs. opoid induced hyponatremia vs. marijuana induced  - CTH unremarkable, Ammonia wnl, alcohol level wnl, +acidotic, hypoNatremic/hypochloremic, neg covid, clear ua  - negative code stroke per neurology, no sz, +repetitive wording, but mental status improving from AOx1 to AOX4  - f/u repeat CBC, CMP, TSH/T4, VBG, and  HIV  - f/u urine tox

## 2022-08-29 NOTE — H&P ADULT - NSHPREVIEWOFSYSTEMS_GEN_ALL_CORE
REVIEW OF SYSTEMS:  CONSTITUTIONAL: No weakness, fevers or chills  EYES/ENT: No visual changes;  No vertigo or throat pain   NECK: No pain or stiffness  RESPIRATORY: No cough, wheezing, hemoptysis; No shortness of breath  CARDIOVASCULAR: No chest pain or palpitations  GASTROINTESTINAL: No abdominal or epigastric pain. No nausea, vomiting, or hematemesis; No diarrhea or constipation. No melena or hematochezia.  GENITOURINARY: No dysuria, frequency or hematuria  NEUROLOGICAL: No numbness or weakness, +difficultly talking, repeating words  SKIN: No itching, rashes REVIEW OF SYSTEMS:  CONSTITUTIONAL: No weakness, fevers or chills  EYES/ENT: No visual changes;  No vertigo or throat pain   NECK: No pain or stiffness  RESPIRATORY: No cough, wheezing, hemoptysis; No shortness of breath  CARDIOVASCULAR: No chest pain or palpitations  GASTROINTESTINAL: No abdominal or epigastric pain. No nausea, vomiting, or hematemesis; No diarrhea or constipation. No melena or hematochezia.  GENITOURINARY: No dysuria, frequency or hematuria  NEUROLOGICAL: No numbness or weakness, +difficultly talking, repeating words  SKIN: No itching, rashes  back : chronic back pain   ext: no clubbing , + cyanosis , no edema

## 2022-08-29 NOTE — H&P ADULT - ASSESSMENT
76 year old left-handed female w/ PMHx chronic back pain (s/p multiple spinal surgeries, including cervical ACDF and lumbar laminectomy, on morphine 60mg QD), HTN, T2DM, migraine, hypothyroid, Raynaud's, who presents with metabolic encephalopathy most likely i/s/o hyponatremia.

## 2022-08-29 NOTE — PROGRESS NOTE ADULT - PROBLEM SELECTOR PLAN 1
- metabolic encephalopathy possibly 2/2 metabolic vs. infectious vs. drug induced   - most likely 2/2 hyponatremia vs. opoid induced hyponatremia vs. marijuana induced  - CTH unremarkable, Ammonia wnl, alcohol level wnl, +acidotic, hypoNatremic/hypochloremic, neg covid, clear ua  - negative code stroke per neurology, no sz, +repetitive wording, but mental status improving from AOx1 to AOX4  - f/u repeat CBC, CMP, TSH/T4, VBG, and  HIV  - f/u urine tox - metabolic encephalopathy possibly 2/2 metabolic vs. infectious vs. drug induced   - most likely 2/2 hyponatremia vs. opoid induced hyponatremia vs. marijuana induced  - CTH unremarkable, Ammonia wnl, alcohol level wnl, +acidotic, hypoNatremic/hypochloremic, neg covid, clear ua  - negative code stroke per neurology, no sz, +repetitive wording, but mental status improving from AOx1 to AOX4  - U negative for illicits, positive for opiates with known Rx - metabolic encephalopathy possibly 2/2 metabolic vs. infectious vs. drug induced; polypharmacy likely a factor  - most likely 2/2 hyponatremia vs. opoid induced hyponatremia vs. marijuana induced  - CTH unremarkable, Ammonia wnl, alcohol level wnl, +acidotic, hypoNatremic/hypochloremic, neg covid, clear ua  - negative code stroke per neurology, no sz, +repetitive wording, but mental status improving  - U negative for illicits, positive for opiates with known Rx  - f/u pending labs - metabolic encephalopathy possibly 2/2 metabolic vs. infectious vs. drug induced; polypharmacy likely a factor  - most likely 2/2 hyponatremia vs. opoid induced hyponatremia vs. marijuana induced  - CTH unremarkable, Ammonia wnl, alcohol level wnl, +acidotic, hypoNatremic/hypochloremic, neg covid, clear ua  - negative code stroke per neurology, no sz, +repetitive wording, but mental status improving  - U negative for illicits, positive for opiates with known Rx  - will hold home topiramate, muscle relaxants  - f/u pending labs

## 2022-08-29 NOTE — H&P ADULT - NSHPLABSRESULTS_GEN_ALL_CORE
.  LABS:                         10.4   10.60 )-----------( 259      ( 28 Aug 2022 20:45 )             31.2         124<L>  |  89<L>  |  60<H>  ----------------------------<  114<H>  4.0   |  23  |  1.39<H>    Ca    9.9      28 Aug 2022 20:45    TPro  7.7  /  Alb  4.1  /  TBili  0.2  /  DBili  x   /  AST  19  /  ALT  14  /  AlkPhos  54      PT/INR - ( 28 Aug 2022 20:45 )   PT: 11.5 sec;   INR: 1.00 ratio         PTT - ( 28 Aug 2022 20:45 )  PTT:31.7 sec  Urinalysis Basic - ( 28 Aug 2022 22:12 )    Color: Yellow / Appearance: Clear / S.014 / pH: x  Gluc: x / Ketone: Negative  / Bili: Negative / Urobili: Negative   Blood: x / Protein: Negative / Nitrite: Negative   Leuk Esterase: Negative / RBC: x / WBC x   Sq Epi: x / Non Sq Epi: x / Bacteria: x            RADIOLOGY, EKG & ADDITIONAL TESTS: Reviewed.      Left lower lung patchy airspace opacity, possibly consolidation or   aspiration. Additional hazy opacity near the left costophrenic angle may   represent atelectasis versus layering pleural fluid.  Right peripheral hazy opacity, possibly atelectasis. .  LABS:                         10.4   10.60 )-----------( 259      ( 28 Aug 2022 20:45 )             31.2         124<L>  |  89<L>  |  60<H>  ----------------------------<  114<H>  4.0   |  23  |  1.39<H>    Ca    9.9      28 Aug 2022 20:45    TPro  7.7  /  Alb  4.1  /  TBili  0.2  /  DBili  x   /  AST  19  /  ALT  14  /  AlkPhos  54      PT/INR - ( 28 Aug 2022 20:45 )   PT: 11.5 sec;   INR: 1.00 ratio         PTT - ( 28 Aug 2022 20:45 )  PTT:31.7 sec  Urinalysis Basic - ( 28 Aug 2022 22:12 )    Color: Yellow / Appearance: Clear / S.014 / pH: x  Gluc: x / Ketone: Negative  / Bili: Negative / Urobili: Negative   Blood: x / Protein: Negative / Nitrite: Negative   Leuk Esterase: Negative / RBC: x / WBC x   Sq Epi: x / Non Sq Epi: x / Bacteria: x    RADIOLOGY, EKG & ADDITIONAL TESTS: Reviewed.    CXR:  Left lower lung patchy airspace opacity, possibly consolidation or   aspiration. Additional hazy opacity near the left costophrenic angle may   represent atelectasis versus layering pleural fluid.  Right peripheral hazy opacity, possibly atelectasis.    CTH:  No CT evidence of acute intracranial hemorrhage, mass effect, or midline   shift. Age-related involutional and microvascular changes.

## 2022-08-29 NOTE — H&P ADULT - ATTENDING COMMENTS
76F  w/ PMHx chronic back pain , HTN, T2DM, migraine, hypothyroid, Raynaud's, p/w AMS  x few days , currently afebrile , normotensive , appears volume depleted , labs w/ hyponatremia , Na 124 , urine osm 324 , FeNA 0.2% ,  head imaging w/o acute findings ; suspect encephalopathy 2/2 to metabolic derangements , will provide IV fluid hydration and expect correction of RENNY and sodium with volume expansion , can check UTox

## 2022-08-29 NOTE — PROGRESS NOTE ADULT - PROBLEM SELECTOR PLAN 12
DVT PPx: Heparin 5000 subq Q8h  Diet: Regular  Code: Full  Dispo: PT/OT Pending Hospital Course  Pharmacy:  PCP:   Communication: - cw famotidine

## 2022-08-29 NOTE — H&P ADULT - PROBLEM SELECTOR PLAN 9
- h/o raynauds with cyanotic and cold UE digits BL  - continue with hot packs PRN while pt is awake  - CTM

## 2022-08-29 NOTE — PROGRESS NOTE ADULT - PROBLEM SELECTOR PLAN 5
- pt with no respiratory distress, saturating on RA, mild crackles in posterior lung bases BL   - CXR with LLL patchy opacity possibly consolidation vs. aspiration; RLL with hazy opacity with possible atelectasis   - VBG with metabolic acidosis, non-anion gap   - f/u repeat VBG, active T+S  - at risk for aspiration, however, mental status improving- no SIRS criteria, monitor off empiric abx - pt with no respiratory distress, saturating on RA, mild crackles in posterior lung bases BL   - CXR with LLL patchy opacity possibly consolidation vs. aspiration; RLL with hazy opacity with possible atelectasis   - VBG with metabolic acidosis, non-anion gap   - active T+S  - at risk for aspiration, however, mental status improving- no SIRS criteria, monitor off empiric abx

## 2022-08-29 NOTE — PROGRESS NOTE ADULT - PROBLEM SELECTOR PLAN 3
- h/o of chronic marijuana use  - h/o cocaine/heroin use in 70s  - f/u urine drug tox - h/o of chronic marijuana use  - h/o cocaine/heroin use in 70s

## 2022-08-29 NOTE — H&P ADULT - PROBLEM SELECTOR PLAN 5
- f/u TSH/T4  - CW synthroid - pt with no respiratory distress, saturating on RA, mild crackles in posterior lung bases BL   - CXR with LLL patchy opacity possibly consolidation vs. aspiration; RLL with hazy opacity with possible atelectasis   - VBG with metabolic acidosis, non-anion gap   - f/u repeat VBG, active T+S  - at risk for aspiration, however, mental status improving- no SIRS criteria, monitor off empiric abx

## 2022-08-29 NOTE — PROGRESS NOTE ADULT - PROBLEM SELECTOR PLAN 8
- f/u TSH/T4  - CW synthroid - TSH nml  - CW synthroid - c/w home amlodipine 10 qd - c/w home amlodipine 10 qd  - holding home ARB/HCTZ

## 2022-08-29 NOTE — PROGRESS NOTE ADULT - ATTENDING COMMENTS
76F  w/ PMHx chronic back pain , HTN, T2DM, migraine, hypothyroid, Raynaud's, p/w AMS  x few days    Seen and examined at bedside, appears to be improving significantly and may be approaching baseline. Pt is AOAx4.     AMS likely due to polypharmacy  Hold sedating meds  d/w pt in detail  Topamax is a high dose but she says she still had headaches on 200mg  Hold Flexeril and morphine  d/w Pharmacy and pt    Hypovolemic Hyponatremia  continue IVF  TSH wnl    d/w pt and team in detail 76F  w/ PMHx chronic back pain , HTN, T2DM, migraine, hypothyroid, Raynaud's, p/w AMS  x few days    Seen and examined at bedside, appears to be improving significantly and may be approaching baseline. Pt is AOAx4.     AMS likely due to polypharmacy  Hold sedating meds  d/w pt in detail  Topamax is a high dose but she says she still had headaches on 200mg, will need to change or titrate for migraine ppx  Hold Flexeril and morphine  d/w Pharmacy and pt    Hypovolemic Hyponatremia  continue IVF  TSH wnl    d/w pt and team in detail

## 2022-08-29 NOTE — PROGRESS NOTE ADULT - PROBLEM SELECTOR PLAN 6
- hold morphine for now i/s/o abnormal mental status   -  f/u AM lab for improving sodium level   - once mental status improve then resume home dose  - confirm pain medications with pt's daughter, ISTOP for morphine complete - hold morphine for now i/s/o abnormal mental status   - f/u AM lab for improving sodium level   - once mental status improve then resume home dose  - confirm pain medications with pt's daughter, ISTOP for morphine complete

## 2022-08-29 NOTE — H&P ADULT - NSHPPHYSICALEXAM_GEN_ALL_CORE
PHYSICAL EXAM:  GENERAL: NAD  HEENT:  Head atraumatic, EOMI, PERRLA, conjunctiva and sclera clear; dry mucous membranes  NECK: Supple, No JVD, no lymphadenopathy, no thyroid nodules or enlargement  CHEST/LUNG: Unlabored respirations on room air; posterior lower lobes with mild crackles BL  HEART: Regular rate and rhythm; No murmurs, rubs, or gallops  ABDOMEN: Bowel sounds present; Soft, Nontender, Nondistended. +RLL stimulator device  EXTREMITIES:  2+ Peripheral Pulses, brisk capillary refill. No clubbing, cyanosis, or edema  NERVOUS SYSTEM:  Alert & Oriented X3 to self, place, year, president, reason for admission; +mild rigidity of BL UE, FROM of LE, answers questions appropriate and follows commands; non-focal and spontaneous movements of all extremities; sensation intact throughout   SKIN: pale with violaceous BL eyelids, no butterfly rash, shawl-like erythema, +cyanotic UE digits (consistent with raynaud's) Vital Signs Last 24 Hrs  T(C): 36.6 (29 Aug 2022 01:15), Max: 36.6 (28 Aug 2022 20:18)  T(F): 97.9 (29 Aug 2022 01:15), Max: 97.9 (28 Aug 2022 20:18)  HR: 91 (29 Aug 2022 01:15) (84 - 92)  BP: 111/60 (29 Aug 2022 01:15) (110/58 - 149/71)  BP(mean): 73 (28 Aug 2022 21:15) (73 - 73)  RR: 15 (29 Aug 2022 01:15) (15 - 18)  SpO2: 95% (29 Aug 2022 01:15) (95% - 96%)    Parameters below as of 29 Aug 2022 01:15  Patient On (Oxygen Delivery Method): room air      PHYSICAL EXAM:  GENERAL: NAD  HEENT:  Head atraumatic, EOMI, PERRLA, conjunctiva and sclera clear; dry mucous membranes  NECK: Supple, No JVD, no lymphadenopathy, no thyroid nodules or enlargement  CHEST/LUNG: Unlabored respirations on room air; posterior lower lobes with mild crackles BL  HEART: Regular rate and rhythm; No murmurs, rubs, or gallops  ABDOMEN: Bowel sounds present; Soft, Nontender, Nondistended. +RLL stimulator device  EXTREMITIES:  2+ Peripheral Pulses, brisk capillary refill. No clubbing, cyanosis, or edema  NERVOUS SYSTEM:  Alert & Oriented X3 to self, place, year, president, reason for admission; +mild rigidity of BL UE, FROM of LE, answers questions appropriate and follows commands; non-focal and spontaneous movements of all extremities; sensation intact throughout   SKIN: pale with violaceous BL eyelids, no butterfly rash, shawl-like erythema, +cyanotic UE digits (consistent with raynaud's)

## 2022-08-29 NOTE — H&P ADULT - PROBLEM SELECTOR PLAN 3
- CXR with LLL patchy opacity possibly consolidation vs. aspiration; RLL with hazy opacity with possible atelectasis   - VBG with metabolic acidosis, non-anion gap   - % saturation  - f/u repeat VBG, active T+S, boderline hypothermic  - at risk for aspiration, consider starting on zosyn - h/o of chronic marijuana use  - h/o cocaine/heroin use in 70s  - f/u urine drug tox

## 2022-08-29 NOTE — H&P ADULT - PROBLEM SELECTOR PLAN 4
- RENNY with BUN 60/1.399  - possibly i/s/o hypovolemia  - f/u repeat Cr s/p IVF hydration, see above plan

## 2022-08-29 NOTE — H&P ADULT - HISTORY OF PRESENT ILLNESS
76 year old left-handed female w/ PMHx chronic back pain (s/p multiple spinal surgeries, including cervical ACDF and lumbar laminectomy, on morphine 60mg QD), HTN, T2DM, migraine, hypothyroid, Raynaud's, who presents for confusion and difficulty ambulating. Patient accompanied by her daughter, who called EMS. Patient's LKN over 3 days prior to arrival. Patient's daughter stating that she had not seen patient in about a week. However, today, patient noted to be repeating sentences, not making sense. Patient told her daughter that she was having difficulty walking since last night. Patient is normally AOx3, ambulates on her own, and is cognitively intact. Per neuro evaluation, the pt had tingling in b/l hands i/s/o Raynaud's. Patient recently started on new pain medication, but the name of the medication was unknown during this initial encounter. Pt denies CP, SOB, abdominal pain, changes in vision, headaches, n/v/d/c, dysuria or syncope.    Code stroke was called upon arrival to ED, CTH unremarkable, electrolyte findings consistent with metabolic encephalopathy possibly 2/2 hyponatremia.  76 year old left-handed female w/ PMHx chronic back pain (s/p multiple spinal surgeries, including cervical ACDF and lumbar laminectomy, on morphine 60mg QD), HTN, T2DM, migraine, hypothyroid, Raynaud's, who presents for confusion and difficulty ambulating. Patient accompanied by her daughter, who called EMS. Patient's LKN over 3 days prior to arrival. Patient's daughter stating that she had not seen patient in about a week. However, today, patient noted to be repeating sentences, not making sense. Patient told her daughter that she was having difficulty walking since last night. Patient is normally AOx3, ambulates on her own, and is cognitively intact. The pt had tingling in b/l hands i/s/o Raynaud's. Patient recently started on new pain medication, but the name of the medication was unknown during this initial encounter. Pt denies CP, SOB, abdominal pain, changes in vision, headaches, n/v/d/c, dysuria or syncope.    Code stroke was called upon arrival to ED, CTH unremarkable, code stroke cancelled; electrolyte findings consistent with metabolic encephalopathy possibly 2/2 hyponatremia.     On medical evaluation, the pt appeared more alert and awake. She was able to answer questions but intermittently would repeat words during question/interview. The pt denied any active pain, complained of wanting to go to the bathroom and frustrated that she can not talk correctly. However, as the evaluation continued, the pt noticed her speech improving the more awake she appeared. No CP, SOB, abdominal pain, N/V/D, fever or chills.

## 2022-08-29 NOTE — H&P ADULT - NSICDXPASTMEDICALHX_GEN_ALL_CORE_FT
PAST MEDICAL HISTORY:  Anemia h/o    Anxiety h/o    Asthma Allergy induced    Chronic pain disorder h/o scoliosis  neuropathy/ lumbar radiculopathy from lumbar stenosis  1999 pain management DR Vargas  2004 /  [phone]    GERD (gastroesophageal reflux disease)     History of fever 03/2020 after returning from [place], with fatigue, head ache relieved by resque inhaler, probably was due to Flu    HTN (hypertension)     Hypothyroid     Lung nodule 2017, on follow up with Dr bree celeste, [facility] visit 11/2020, last Ct 11/02/2020 , same like last year    Migraine h/o    Spinal stenosis     Type 2 diabetes mellitus h/o DM last  A1C was 5.9 , currently no ton meds

## 2022-08-29 NOTE — PROGRESS NOTE ADULT - SUBJECTIVE AND OBJECTIVE BOX
DATE OF SERVICE: 22 @ 09:11    Patient is a 76y old  Female who presents with a chief complaint of encephalopathy (29 Aug 2022 03:29)      SUBJECTIVE / OVERNIGHT EVENTS: No acute events overnight, patient reports doing well and all questions answered at this time.   Denies any other acute sx including f/c, HA, cough, sore throat, eye/ear changes, rhinorrhea, CP, palpitations, SOB, n/v, abdominal pain, back pain, bowel/bladder changes, fatigue, numbness or tingling.    MEDICATIONS  (STANDING):  amLODIPine   Tablet 10 milliGRAM(s) Oral daily  famotidine    Tablet 20 milliGRAM(s) Oral two times a day  heparin   Injectable 5000 Unit(s) SubCutaneous every 8 hours  levothyroxine 88 MICROGram(s) Oral daily  montelukast 10 milliGRAM(s) Oral daily    MEDICATIONS  (PRN):  cyclobenzaprine 5 milliGRAM(s) Oral three times a day PRN Muscle Spasm      Vital Signs Last 24 Hrs  T(C): 36.5 (29 Aug 2022 05:31), Max: 36.6 (28 Aug 2022 20:18)  T(F): 97.7 (29 Aug 2022 05:31), Max: 97.9 (28 Aug 2022 20:18)  HR: 77 (29 Aug 2022 05:31) (77 - 92)  BP: 136/81 (29 Aug 2022 05:31) (110/58 - 149/71)  BP(mean): 73 (28 Aug 2022 21:15) (73 - 73)  RR: 18 (29 Aug 2022 05:31) (15 - 18)  SpO2: 95% (29 Aug 2022 05:31) (95% - 96%)    Parameters below as of 29 Aug 2022 05:31  Patient On (Oxygen Delivery Method): room air      CAPILLARY BLOOD GLUCOSE      POCT Blood Glucose.: 104 mg/dL (28 Aug 2022 20:13)    I&O's Summary      PHYSICAL EXAM:  GENERAL: Clinically well-appearing and comfortable  HEAD:  Atraumatic, Normocephalic  EYES: EOMI, PERRLA, conjunctiva and sclera clear  NECK: Supple, No JVD  CHEST/LUNG: Clear to auscultation bilaterally; No wheeze  HEART: Regular rate and rhythm; No murmurs, rubs, or gallops  ABDOMEN: Soft, Nontender, Nondistended; Bowel sounds present  EXTREMITIES:  2+ Peripheral Pulses, No clubbing, cyanosis, or edema  PSYCH: Normal mood, Normal affect  NEUROLOGY: non-focal  SKIN: No rashes or lesions    LABS:                        9.1    8.61  )-----------( 247      ( 29 Aug 2022 04:47 )             27.1     08-    125<L>  |  92<L>  |  53<H>  ----------------------------<  112<H>  3.9   |  24  |  1.24    Ca    9.4      29 Aug 2022 04:47  Phos  4.2       Mg     2.6         TPro  6.8  /  Alb  3.5  /  TBili  0.1<L>  /  DBili  x   /  AST  20  /  ALT  12  /  AlkPhos  48      PT/INR - ( 28 Aug 2022 20:45 )   PT: 11.5 sec;   INR: 1.00 ratio         PTT - ( 28 Aug 2022 20:45 )  PTT:31.7 sec      Urinalysis Basic - ( 28 Aug 2022 22:12 )    Color: Yellow / Appearance: Clear / S.014 / pH: x  Gluc: x / Ketone: Negative  / Bili: Negative / Urobili: Negative   Blood: x / Protein: Negative / Nitrite: Negative   Leuk Esterase: Negative / RBC: x / WBC x   Sq Epi: x / Non Sq Epi: x / Bacteria: x        RADIOLOGY & ADDITIONAL TESTS:    Imaging Personally Reviewed:    Consultant(s) Notes Reviewed:      Care Discussed with Consultants/Other Providers:   DATE OF SERVICE: 22 @ 09:11    Patient is a 76y old  Female who presents with a chief complaint of encephalopathy (29 Aug 2022 03:29)      SUBJECTIVE / OVERNIGHT EVENTS (limited by mental status): Pt reports persistent chronic left thigh pain. No other acute complaints    MEDICATIONS  (STANDING):  amLODIPine   Tablet 10 milliGRAM(s) Oral daily  famotidine    Tablet 20 milliGRAM(s) Oral two times a day  heparin   Injectable 5000 Unit(s) SubCutaneous every 8 hours  levothyroxine 88 MICROGram(s) Oral daily  montelukast 10 milliGRAM(s) Oral daily    MEDICATIONS  (PRN):  cyclobenzaprine 5 milliGRAM(s) Oral three times a day PRN Muscle Spasm      Vital Signs Last 24 Hrs  T(C): 36.5 (29 Aug 2022 05:31), Max: 36.6 (28 Aug 2022 20:18)  T(F): 97.7 (29 Aug 2022 05:31), Max: 97.9 (28 Aug 2022 20:18)  HR: 77 (29 Aug 2022 05:31) (77 - 92)  BP: 136/81 (29 Aug 2022 05:31) (110/58 - 149/71)  BP(mean): 73 (28 Aug 2022 21:15) (73 - 73)  RR: 18 (29 Aug 2022 05:31) (15 - 18)  SpO2: 95% (29 Aug 2022 05:31) (95% - 96%)    Parameters below as of 29 Aug 2022 05:31  Patient On (Oxygen Delivery Method): room air      CAPILLARY BLOOD GLUCOSE      POCT Blood Glucose.: 104 mg/dL (28 Aug 2022 20:13)    I&O's Summary      PHYSICAL EXAM (limited by mental status):  GENERAL: Elderly female, comfortable, NAD  HEAD:  Atraumatic, Normocephalic  EYES: EOMI, PERRLA, conjunctiva and sclera clear  NECK: Supple, No JVD  CHEST/LUNG: Clear to auscultation bilaterally; No wheeze  HEART: Regular rate and rhythm; No murmurs, rubs, or gallops  ABDOMEN: Soft, Nontender, Nondistended; Bowel sounds present  EXTREMITIES:  2+ Peripheral Pulses, No clubbing, cyanosis, or edema  NEUROLOGY: Alert and oriented x 3, can state name, place, month, president; mild-moderate confusion, repeating some remarks but answering other questions appropriately, cranial nerves grossly in tact, strength and sensation in tact in all extremities  SKIN: No rashes or lesions    LABS:                        9.1    8.61  )-----------( 247      ( 29 Aug 2022 04:47 )             27.1         125<L>  |  92<L>  |  53<H>  ----------------------------<  112<H>  3.9   |  24  |  1.24    Ca    9.4      29 Aug 2022 04:47  Phos  4.2       Mg     2.6         TPro  6.8  /  Alb  3.5  /  TBili  0.1<L>  /  DBili  x   /  AST  20  /  ALT  12  /  AlkPhos  48      PT/INR - ( 28 Aug 2022 20:45 )   PT: 11.5 sec;   INR: 1.00 ratio         PTT - ( 28 Aug 2022 20:45 )  PTT:31.7 sec      Urinalysis Basic - ( 28 Aug 2022 22:12 )    Color: Yellow / Appearance: Clear / S.014 / pH: x  Gluc: x / Ketone: Negative  / Bili: Negative / Urobili: Negative   Blood: x / Protein: Negative / Nitrite: Negative   Leuk Esterase: Negative / RBC: x / WBC x   Sq Epi: x / Non Sq Epi: x / Bacteria: x        RADIOLOGY & ADDITIONAL TESTS:    Imaging Personally Reviewed:    Consultant(s) Notes Reviewed:      Care Discussed with Consultants/Other Providers:

## 2022-08-29 NOTE — PROGRESS NOTE ADULT - PROBLEM SELECTOR PLAN 10
- CW monteleukast   - CW duoneb treatment PRN - h/o raynauds with cyanotic and cold UE digits BL  - continue with hot packs PRN while pt is awake  - CTM

## 2022-08-29 NOTE — H&P ADULT - PROBLEM SELECTOR PLAN 7
- cw famotidine - h/o of multiple spinal surgeries and taras placement   - s/p RLQ medical stimulator device for pain management   - NOT MRI compatible*  - follow pain management plan as above

## 2022-08-29 NOTE — PROGRESS NOTE ADULT - ASSESSMENT
76 year old left-handed female w/ PMHx chronic back pain (s/p multiple spinal surgeries, including cervical ACDF and lumbar laminectomy, on morphine 60mg QD), HTN, T2DM, migraine, hypothyroid, Raynaud's, who presents with metabolic encephalopathy most likely i/s/o hyponatremia. 76 year old female w/ PMHx chronic back pain (s/p multiple spinal surgeries, including cervical ACDF and lumbar laminectomy, on morphine 60mg QD), HTN, T2DM, migraine, hypothyroid, Raynaud who presents with metabolic encephalopathy most likely i/s/o hyponatremia. 76 year old female w/ PMHx chronic back pain (s/p multiple spinal surgeries, including cervical ACDF and lumbar laminectomy, on morphine 60mg QD), HTN, T2DM, migraine, hypothyroid, Raynaud who presents with metabolic encephalopathy most likely i/s/o hyponatremia, gradually improving s/p hydration

## 2022-08-29 NOTE — H&P ADULT - PROBLEM SELECTOR PLAN 6
- CW monteleukast   - CW duoneb treatment PRN - hold morphine for now i/s/o abnormal mental status   -  f/u AM lab for improving sodium level   - once mental status improve then resume home dose  - confirm pain medications with pt's daughter, ISTOP for morphine complete

## 2022-08-29 NOTE — H&P ADULT - PROBLEM SELECTOR PLAN 12
DVT PPx: Heparin 5000 subq Q8h  Diet: Regular  Code: Full  Dispo: PT/OT Pending Hospital Course  Pharmacy:  PCP:   Communication:

## 2022-08-29 NOTE — H&P ADULT - PROBLEM SELECTOR PLAN 2
- Serum Na 124 (no hyperglycemic correction indicated)   - Serum - Serum Na 124 (no hyperglycemic correction indicated), serum osm most likely low  - FeNa 0.2% pre-renal (hypovlemic)  - most likely hypovolemic, hypotonic hyponatremia, no elevated TG or lipid panel (no additional solutes)  - cw volume expansion s/p  cc (Na correction 414 rate)  - f/u repeat CMP and hyponatremic studies from 0400   - goal Na ~6-8 meq correction/24hr  - CTM for headache, sz precautions, neurological changes  - f/u CMP q6h

## 2022-08-29 NOTE — H&P ADULT - NSHPSOCIALHISTORY_GEN_ALL_CORE
The patient is a  (x13 years),  x2, lives alone with neighbors nearby and has 2 daughters. The pt has h/o heroine, cocaine and tobacco use in 1970s, currently smokes marijuana (most recently this past week).

## 2022-08-29 NOTE — PROGRESS NOTE ADULT - PROBLEM SELECTOR PLAN 2
- Serum Na 124 (no hyperglycemic correction indicated), serum osm most likely low  - FeNa 0.2% pre-renal (hypovlemic)  - most likely hypovolemic, hypotonic hyponatremia, no elevated TG or lipid panel (no additional solutes)  - cw volume expansion s/p  cc (Na correction 414 rate)  - f/u repeat CMP and hyponatremic studies from 0400   - goal Na ~6-8 meq correction/24hr  - CTM for headache, sz precautions, neurological changes  - f/u CMP q6h - Serum Na 124 (no hyperglycemic correction indicated), serum osm low  - FeNa 0.2% pre-renal (hypovolemic)  - most likely hypovolemic, hypotonic hyponatremia, no elevated TG or lipid panel (no additional solutes)  - cw volume expansion s/p  cc (Na correction 414 rate)  - goal Na ~6-8 meq correction/24hr  - CTM for headache, sz precautions, neurological changes  - f/u CMP q6h

## 2022-08-30 LAB
ALBUMIN SERPL ELPH-MCNC: 4 G/DL — SIGNIFICANT CHANGE UP (ref 3.3–5)
ALP SERPL-CCNC: 60 U/L — SIGNIFICANT CHANGE UP (ref 40–120)
ALT FLD-CCNC: 13 U/L — SIGNIFICANT CHANGE UP (ref 10–45)
ANION GAP SERPL CALC-SCNC: 11 MMOL/L — SIGNIFICANT CHANGE UP (ref 5–17)
AST SERPL-CCNC: 17 U/L — SIGNIFICANT CHANGE UP (ref 10–40)
BILIRUB SERPL-MCNC: 0.2 MG/DL — SIGNIFICANT CHANGE UP (ref 0.2–1.2)
BUN SERPL-MCNC: 28 MG/DL — HIGH (ref 7–23)
CALCIUM SERPL-MCNC: 9.9 MG/DL — SIGNIFICANT CHANGE UP (ref 8.4–10.5)
CHLORIDE SERPL-SCNC: 98 MMOL/L — SIGNIFICANT CHANGE UP (ref 96–108)
CO2 SERPL-SCNC: 26 MMOL/L — SIGNIFICANT CHANGE UP (ref 22–31)
CREAT SERPL-MCNC: 0.85 MG/DL — SIGNIFICANT CHANGE UP (ref 0.5–1.3)
EGFR: 70 ML/MIN/1.73M2 — SIGNIFICANT CHANGE UP
FERRITIN SERPL-MCNC: 351 NG/ML — HIGH (ref 15–150)
FOLATE SERPL-MCNC: >20 NG/ML — SIGNIFICANT CHANGE UP
GLUCOSE BLDC GLUCOMTR-MCNC: 100 MG/DL — HIGH (ref 70–99)
GLUCOSE BLDC GLUCOMTR-MCNC: 121 MG/DL — HIGH (ref 70–99)
GLUCOSE BLDC GLUCOMTR-MCNC: 128 MG/DL — HIGH (ref 70–99)
GLUCOSE SERPL-MCNC: 118 MG/DL — HIGH (ref 70–99)
HCT VFR BLD CALC: 31.1 % — LOW (ref 34.5–45)
HGB BLD-MCNC: 10.5 G/DL — LOW (ref 11.5–15.5)
IRON SATN MFR SERPL: 10 % — LOW (ref 14–50)
IRON SATN MFR SERPL: 20 UG/DL — LOW (ref 30–160)
MAGNESIUM SERPL-MCNC: 2 MG/DL — SIGNIFICANT CHANGE UP (ref 1.6–2.6)
MCHC RBC-ENTMCNC: 30.7 PG — SIGNIFICANT CHANGE UP (ref 27–34)
MCHC RBC-ENTMCNC: 33.8 GM/DL — SIGNIFICANT CHANGE UP (ref 32–36)
MCV RBC AUTO: 90.9 FL — SIGNIFICANT CHANGE UP (ref 80–100)
NRBC # BLD: 0 /100 WBCS — SIGNIFICANT CHANGE UP (ref 0–0)
PHOSPHATE SERPL-MCNC: 3.3 MG/DL — SIGNIFICANT CHANGE UP (ref 2.5–4.5)
PLATELET # BLD AUTO: 324 K/UL — SIGNIFICANT CHANGE UP (ref 150–400)
POTASSIUM SERPL-MCNC: 4.4 MMOL/L — SIGNIFICANT CHANGE UP (ref 3.5–5.3)
POTASSIUM SERPL-SCNC: 4.4 MMOL/L — SIGNIFICANT CHANGE UP (ref 3.5–5.3)
PROT SERPL-MCNC: 8 G/DL — SIGNIFICANT CHANGE UP (ref 6–8.3)
RBC # BLD: 3.42 M/UL — LOW (ref 3.8–5.2)
RBC # FLD: 14.3 % — SIGNIFICANT CHANGE UP (ref 10.3–14.5)
SODIUM SERPL-SCNC: 135 MMOL/L — SIGNIFICANT CHANGE UP (ref 135–145)
T PALLIDUM AB TITR SER: NEGATIVE — SIGNIFICANT CHANGE UP
TIBC SERPL-MCNC: 195 UG/DL — LOW (ref 220–430)
UIBC SERPL-MCNC: 175 UG/DL — SIGNIFICANT CHANGE UP (ref 110–370)
VIT B12 SERPL-MCNC: >2000 PG/ML — HIGH (ref 232–1245)
VIT B12 SERPL-MCNC: >2000 PG/ML — HIGH (ref 232–1245)
WBC # BLD: 9.5 K/UL — SIGNIFICANT CHANGE UP (ref 3.8–10.5)
WBC # FLD AUTO: 9.5 K/UL — SIGNIFICANT CHANGE UP (ref 3.8–10.5)

## 2022-08-30 PROCEDURE — 99233 SBSQ HOSP IP/OBS HIGH 50: CPT | Mod: GC

## 2022-08-30 PROCEDURE — 99222 1ST HOSP IP/OBS MODERATE 55: CPT

## 2022-08-30 RX ORDER — LOSARTAN POTASSIUM 100 MG/1
100 TABLET, FILM COATED ORAL DAILY
Refills: 0 | Status: DISCONTINUED | OUTPATIENT
Start: 2022-08-30 | End: 2022-08-31

## 2022-08-30 RX ORDER — LIDOCAINE 4 G/100G
2 CREAM TOPICAL DAILY
Refills: 0 | Status: DISCONTINUED | OUTPATIENT
Start: 2022-08-30 | End: 2022-08-31

## 2022-08-30 RX ORDER — MORPHINE SULFATE 50 MG/1
30 CAPSULE, EXTENDED RELEASE ORAL DAILY
Refills: 0 | Status: DISCONTINUED | OUTPATIENT
Start: 2022-08-30 | End: 2022-08-30

## 2022-08-30 RX ORDER — MORPHINE SULFATE 50 MG/1
60 CAPSULE, EXTENDED RELEASE ORAL AT BEDTIME
Refills: 0 | Status: DISCONTINUED | OUTPATIENT
Start: 2022-08-30 | End: 2022-08-31

## 2022-08-30 RX ORDER — MORPHINE SULFATE 50 MG/1
30 CAPSULE, EXTENDED RELEASE ORAL EVERY 12 HOURS
Refills: 0 | Status: DISCONTINUED | OUTPATIENT
Start: 2022-08-30 | End: 2022-08-30

## 2022-08-30 RX ORDER — GABAPENTIN 400 MG/1
400 CAPSULE ORAL AT BEDTIME
Refills: 0 | Status: DISCONTINUED | OUTPATIENT
Start: 2022-08-30 | End: 2022-08-31

## 2022-08-30 RX ORDER — FAMOTIDINE 10 MG/ML
20 INJECTION INTRAVENOUS DAILY
Refills: 0 | Status: DISCONTINUED | OUTPATIENT
Start: 2022-08-30 | End: 2022-08-31

## 2022-08-30 RX ORDER — SUMATRIPTAN SUCCINATE 4 MG/.5ML
25 INJECTION, SOLUTION SUBCUTANEOUS ONCE
Refills: 0 | Status: COMPLETED | OUTPATIENT
Start: 2022-08-30 | End: 2022-08-30

## 2022-08-30 RX ORDER — SODIUM CHLORIDE 0.65 %
1 AEROSOL, SPRAY (ML) NASAL
Refills: 0 | Status: DISCONTINUED | OUTPATIENT
Start: 2022-08-30 | End: 2022-08-31

## 2022-08-30 RX ORDER — TOPIRAMATE 25 MG
100 TABLET ORAL
Refills: 0 | Status: DISCONTINUED | OUTPATIENT
Start: 2022-08-30 | End: 2022-08-31

## 2022-08-30 RX ORDER — LIDOCAINE 4 G/100G
1 CREAM TOPICAL DAILY
Refills: 0 | Status: DISCONTINUED | OUTPATIENT
Start: 2022-08-30 | End: 2022-08-30

## 2022-08-30 RX ADMIN — LIDOCAINE 2 PATCH: 4 CREAM TOPICAL at 19:00

## 2022-08-30 RX ADMIN — Medication 100 MILLIGRAM(S): at 18:44

## 2022-08-30 RX ADMIN — Medication 100 MILLIGRAM(S): at 22:38

## 2022-08-30 RX ADMIN — Medication 88 MICROGRAM(S): at 05:09

## 2022-08-30 RX ADMIN — LIDOCAINE 1 PATCH: 4 CREAM TOPICAL at 13:38

## 2022-08-30 RX ADMIN — MORPHINE SULFATE 60 MILLIGRAM(S): 50 CAPSULE, EXTENDED RELEASE ORAL at 22:37

## 2022-08-30 RX ADMIN — MORPHINE SULFATE 60 MILLIGRAM(S): 50 CAPSULE, EXTENDED RELEASE ORAL at 23:07

## 2022-08-30 RX ADMIN — HEPARIN SODIUM 5000 UNIT(S): 5000 INJECTION INTRAVENOUS; SUBCUTANEOUS at 13:39

## 2022-08-30 RX ADMIN — MORPHINE SULFATE 30 MILLIGRAM(S): 50 CAPSULE, EXTENDED RELEASE ORAL at 14:07

## 2022-08-30 RX ADMIN — FAMOTIDINE 20 MILLIGRAM(S): 10 INJECTION INTRAVENOUS at 18:46

## 2022-08-30 RX ADMIN — HEPARIN SODIUM 5000 UNIT(S): 5000 INJECTION INTRAVENOUS; SUBCUTANEOUS at 22:38

## 2022-08-30 RX ADMIN — HEPARIN SODIUM 5000 UNIT(S): 5000 INJECTION INTRAVENOUS; SUBCUTANEOUS at 05:11

## 2022-08-30 RX ADMIN — Medication 1 MILLIGRAM(S): at 13:37

## 2022-08-30 RX ADMIN — MONTELUKAST 10 MILLIGRAM(S): 4 TABLET, CHEWABLE ORAL at 13:38

## 2022-08-30 RX ADMIN — AMLODIPINE BESYLATE 10 MILLIGRAM(S): 2.5 TABLET ORAL at 05:09

## 2022-08-30 RX ADMIN — SUMATRIPTAN SUCCINATE 25 MILLIGRAM(S): 4 INJECTION, SOLUTION SUBCUTANEOUS at 18:51

## 2022-08-30 RX ADMIN — MORPHINE SULFATE 30 MILLIGRAM(S): 50 CAPSULE, EXTENDED RELEASE ORAL at 13:37

## 2022-08-30 RX ADMIN — LIDOCAINE 1 PATCH: 4 CREAM TOPICAL at 19:00

## 2022-08-30 RX ADMIN — GABAPENTIN 400 MILLIGRAM(S): 400 CAPSULE ORAL at 22:40

## 2022-08-30 RX ADMIN — FAMOTIDINE 20 MILLIGRAM(S): 10 INJECTION INTRAVENOUS at 05:09

## 2022-08-30 RX ADMIN — SUMATRIPTAN SUCCINATE 25 MILLIGRAM(S): 4 INJECTION, SOLUTION SUBCUTANEOUS at 19:21

## 2022-08-30 RX ADMIN — LIDOCAINE 2 PATCH: 4 CREAM TOPICAL at 18:47

## 2022-08-30 RX ADMIN — Medication 1 SPRAY(S): at 00:45

## 2022-08-30 NOTE — PROGRESS NOTE ADULT - PROBLEM SELECTOR PLAN 6
- hold morphine for now i/s/o abnormal mental status   - f/u AM lab for improving sodium level   - once mental status improve then resume home dose  - confirm pain medications with pt's daughter, ISTOP for morphine complete - appreciate Chronic pain recs as above

## 2022-08-30 NOTE — PHYSICAL THERAPY INITIAL EVALUATION ADULT - PLANNED THERAPY INTERVENTIONS, PT EVAL
GOAL: Pt will be able to negotiate 12 steps independently in 2 weeks./balance training/bed mobility training/gait training/strengthening/transfer training

## 2022-08-30 NOTE — PROGRESS NOTE ADULT - PROBLEM SELECTOR PLAN 2
- Serum Na 124 (no hyperglycemic correction indicated), serum osm low  - FeNa 0.2% pre-renal (hypovolemic)  - most likely hypovolemic, hypotonic hyponatremia, no elevated TG or lipid panel (no additional solutes)  - cw volume expansion s/p  cc (Na correction 414 rate)  - goal Na ~6-8 meq correction/24hr  - CTM for headache, sz precautions, neurological changes  - f/u CMP q6h - Serum Na 124 (no hyperglycemic correction indicated), serum osm low  - FeNa 0.2% pre-renal (hypovolemic)  - most likely hypovolemic, hypotonic hyponatremia, no elevated TG or lipid panel (no additional solutes)  - cw volume expansion s/p  cc (Na correction 414 rate)  - goal Na ~6-8 meq correction/24hr  - CTM for headache, sz precautions, neurological changes  - f/u CMP now q24  RESOLVED

## 2022-08-30 NOTE — PHYSICAL THERAPY INITIAL EVALUATION ADULT - ADDITIONAL COMMENTS
Pt lives alone in an apartment with 5 steps to negotiate. Pt is (I) with ADLs and functional mobility with no assistive device.

## 2022-08-30 NOTE — PROGRESS NOTE ADULT - PROBLEM SELECTOR PLAN 3
- h/o of chronic marijuana use  - h/o cocaine/heroin use in 70s - h/o of chronic marijuana use, still uses  - h/o cocaine/heroin use in 70s

## 2022-08-30 NOTE — PHYSICAL THERAPY INITIAL EVALUATION ADULT - PERTINENT HX OF CURRENT PROBLEM, REHAB EVAL
76 year old female w/ PMHx chronic back pain (s/p multiple spinal surgeries, including cervical ACDF and lumbar laminectomy, on morphine 60mg QD), HTN, T2DM, migraine, hypothyroid, Raynaud who presents with metabolic encephalopathy most likely i/s/o hyponatremia, improving s/p volume resuscitation

## 2022-08-30 NOTE — PHYSICAL THERAPY INITIAL EVALUATION ADULT - BED MOBILITY TRAINING, PT EVAL
This is a recent snapshot of the patient's [facility] medical record.  For current drug dose and complete information and questions, call [phone] or In Basket pool, fv home infusion ([id number])  CSN Number:  [id number]       GOAL: Pt will perform bed mobility (I) in 2 weeks.

## 2022-08-30 NOTE — CONSULT NOTE ADULT - ASSESSMENT
76 year old left-handed female w/ PMHx chronic back pain (s/p multiple spinal surgeries, including cervical ACDF and lumbar laminectomy, on morphine 60mg QD), HTN, T2DM, migraine, hypothyroid, Raynaud's, who presents for confusion and difficulty ambulating. Patient accompanied by her daughter, who called EMS. Patient's LKN over 3 days prior to arrival. Patient's daughter stating that she had not seen patient in about a week. However, today, patient noted to be repeating sentences, not making sense. Patient told her daughter that she was having difficulty walking since last night. Patient is normally AOx3, ambulates on her own, and is cognitively intact.    Code stroke was called upon arrival to ED, CTH unremarkable, code stroke cancelled; electrolyte findings consistent with metabolic encephalopathy possibly 2/2 hyponatremia.      Pt with chronic back pain, on chronic opioid therapy.    Pt states she has only been taking the MS Contin 60 mg at HS along with total daily dose of neurontin 600 mg at HS.  Topamax recently increased to TID from BID; however pt was taking entire dose of 300 mg Q AM.  Has been taking flexeril ATC daily.    Discussed with pt and family and with Medicine Team 5:  Continue MS Contin 60 mg QHS.  Restart Neurontin at 400 mg QHS- current CrCl is 45.5.  Continue Topamax 100 mg BID for now; agree with Neurology consultation for headaches.  Discontinue flexeril; no spasm and contraindicated per Beer's Criteria and the A[employer] Pain Management Guidelines- pt verbalized an understanding.  https://www.americangeriatrics.org/media-center/news/older-people-medications-are-common-updated-whe-ffjak-vcnvpdhiz-aims-make-sure  Lidoderm patch x 2 daily- one to low back and one to L scapula.    Monitor for sedation and respiratory depression.  Bowel regimen.  PT/ OOB per Medicine team.    Follow up with Dr Vinay Alex for continued pain management after discharge.  Discharge with a narcan rescue kit (naloxone 4 mg/ 0.1 ml nasal spray- 1 spray Q 2-3 minutes alternating between nostrils).      Minutes spent on total encounter: 60 minutes      [facility] Pain [facility]  [phone]

## 2022-08-30 NOTE — PROGRESS NOTE ADULT - ASSESSMENT
76 year old female w/ PMHx chronic back pain (s/p multiple spinal surgeries, including cervical ACDF and lumbar laminectomy, on morphine 60mg QD), HTN, T2DM, migraine, hypothyroid, Raynaud who presents with metabolic encephalopathy most likely i/s/o hyponatremia, gradually improving s/p hydration 76 year old female w/ PMHx chronic back pain (s/p multiple spinal surgeries, including cervical ACDF and lumbar laminectomy, on morphine 60mg QD), HTN, T2DM, migraine, hypothyroid, Raynaud who presents with metabolic encephalopathy most likely i/s/o hyponatremia, improving s/p volume resuscitation

## 2022-08-30 NOTE — PROGRESS NOTE ADULT - PROBLEM SELECTOR PLAN 4
- RENNY with BUN 60/1.399  - possibly i/s/o hypovolemia  - f/u repeat Cr s/p IVF hydration, see above plan - RENNY with BUN 60/1.399  - possibly i/s/o hypovolemia  - f/u repeat Cr s/p IVF hydration  RESOLVED

## 2022-08-30 NOTE — PROGRESS NOTE ADULT - PROBLEM SELECTOR PLAN 1
- metabolic encephalopathy possibly 2/2 metabolic vs. infectious vs. drug induced; polypharmacy likely a factor  - most likely 2/2 hyponatremia vs. opoid induced hyponatremia vs. marijuana induced  - CTH unremarkable, Ammonia wnl, alcohol level wnl, +acidotic, hypoNatremic/hypochloremic, neg covid, clear ua  - negative code stroke per neurology, no sz, +repetitive wording, but mental status improving  - U negative for illicits, positive for opiates with known Rx  - will hold home topiramate, muscle relaxants  - f/u pending labs - metabolic encephalopathy possibly 2/2 metabolic vs. infectious vs. drug induced; polypharmacy likely a factor  - most likely 2/2 hyponatremia vs. opoid induced hyponatremia vs. marijuana induced  - CTH unremarkable, Ammonia wnl, alcohol level wnl, +acidotic, hypoNatremic/hypochloremic, neg covid, clear ua  - negative code stroke per neurology, no sz, +repetitive wording, but mental status improving  - U negative for illicits, positive for opiates with known Rx  - will hold home topiramate, muscle relaxants  - c/s Chronic pain, appreciate recs - metabolic encephalopathy possibly 2/2 metabolic vs. infectious vs. drug induced; polypharmacy likely a factor  - most likely 2/2 hyponatremia vs. opoid induced hyponatremia vs. marijuana induced  - CTH unremarkable, Ammonia wnl, alcohol level wnl, +acidotic, hypoNatremic/hypochloremic, neg covid, clear ua  - negative code stroke per neurology, no sz, +repetitive wording, but mental status improving  - U negative for illicits, positive for opiates with known Rx  - will hold home topiramate, muscle relaxants  - c/s Chronic pain, appreciate recs for medication titration, as patient was incorrectly dosing several Beers Criteria medications at home as expected  - c/s Neurology, appreciate recs and will f/u as outpatient for further management of migraines

## 2022-08-30 NOTE — CONSULT NOTE ADULT - SUBJECTIVE AND OBJECTIVE BOX
Chief Complaint:  Patient is a 76y old  Female who presents with a chief complaint of encephalopathy (30 Aug 2022 06:33)    HPI:  76 year old left-handed female w/ PMHx chronic back pain (s/p multiple spinal surgeries, including cervical ACDF and lumbar laminectomy, on morphine 60mg QD), HTN, T2DM, migraine, hypothyroid, Raynaud's, who presents for confusion and difficulty ambulating. Patient accompanied by her daughter, who called EMS. Patient's LKN over 3 days prior to arrival. Patient's daughter stating that she had not seen patient in about a week. However, today, patient noted to be repeating sentences, not making sense. Patient told her daughter that she was having difficulty walking since last night. Patient is normally AOx3, ambulates on her own, and is cognitively intact. The pt had tingling in b/l hands i/s/o Raynaud's. Patient recently started on new pain medication, but the name of the medication was unknown during this initial encounter.   Code stroke was called upon arrival to ED, CTH unremarkable, code stroke cancelled; electrolyte findings consistent with metabolic encephalopathy possibly 2/2 hyponatremia.     On medical evaluation, the pt appeared more alert and awake. She was able to answer questions but intermittently would repeat words during question/interview. The pt denied any active pain, complained of wanting to go to the bathroom and frustrated that she can not talk correctly. However, as the evaluation continued, the pt noticed her speech improving the more awake she appeared.       Current Pain Score: 8/10    Current out- patient pain regimen: MS Contin 30 mg Q AM; MS Contin 60 mg Q PM; Neurontin 300 mg BID; Topamax 100 mg TID; flexeril 5 mg BID    Out Patient Pain Management provider: Vinay Alex MD    [facility] Prescription Monitoring Program: Reference #[id number]    PAST MEDICAL & SURGICAL HISTORY:  HTN (hypertension)    Type 2 diabetes mellitus  h/o DM last  A1C was 5.9 , currently no ton meds    Hypothyroid    Asthma  Allergy induced    Spinal stenosis    Migraine  h/o    GERD (gastroesophageal reflux disease)    Chronic pain disorder  h/o scoliosis  neuropathy/ lumbar radiculopathy from lumbar stenosis  1999 pain management DR Vargas  [id number] /  [phone]    Anxiety  h/o    Anemia  h/o    History of fever  03/2020 after returning from [place], with fatigue, head ache relieved by resque inhaler, probably was due to Flu    Lung nodule  2017, on follow up with Dr bree celeste, lasyt visit 11/2020, last Ct 11/02/2020 , same like last year    S/P appendectomy  1956    S/P tonsillectomy  1948    S/P D&amp;C (status post dilation and curettage)  x 2  &amp; Cone biopsy 1996    H/O arthroscopy of knee    Status post cataract extraction    S/P lumbar laminectomy  1995    Back pain  Insertion Neurostimulator Metronic 2000   battery change   2002 , 2003, 11/06 off during day time    S/P lumbar laminectomy  Decompressive Laminectomy L-3 -4  L4--5 2003   Lumbar  Arthrodesis  posterior instrumentation 4/04    S/P cervical discectomy  Anterior  with Fusion C3-C4, C4-C5, C5-C6, C6-C7 2006    S/P laminectomy with spinal fusion  Thorasic Posterior Arthrodesis  decompresion 11/06 2014 - Fusion of T8-T10 correction of scoliosis  2016 - L5-S1 stabilization reexploration of fusion    Spinal cord stimulator status  placed in 1999, replaced many times, new one 12/16/2020 ([facility])    H/O cone biopsy of cervix  1996      SOCIAL HISTORY:  Tobacco Use: denies  Alcohol Use: denies  Recreational Marijuana: denies  Illicit Drug Use: denies    Opioid Risk Tool (ORT-OUD) Score: low    Allergies    No Known Drug Allergies  perfumes (Short breath)    Intolerances    Dilaudid (Other)      REVIEW OF SYSTEMS:  CONSTITUTIONAL: No fever, weight loss, fatigue, falls  NEURO: + headaches; no memory loss, tremors, blurred vision; when walking stairs with PT today- ascending no issue but descending experienced vertigo  RESP: No shortness of breath, cough  CV: No chest pain, palpitations  GI: No abdominal pain, nausea, vomiting, diarrhea, constipation   : No urinary incontinence/retention, dysuria   MSK: No joint pain; + neck and back pain; no progressive upper or lower motor strength weakness; no saddle anesthesia   SKIN: No itching, burning, rashes   PSYCHIATRIC: No depression, anxiety, mood swings; +  difficulty sleeping      MEDICATIONS  (STANDING):  amLODIPine   Tablet 10 milliGRAM(s) Oral daily  famotidine    Tablet 20 milliGRAM(s) Oral two times a day  folic acid 1 milliGRAM(s) Oral daily  gabapentin 400 milliGRAM(s) Oral at bedtime  heparin   Injectable 5000 Unit(s) SubCutaneous every 8 hours  levothyroxine 88 MICROGram(s) Oral daily  lidocaine   4% Patch 1 Patch Transdermal daily  montelukast 10 milliGRAM(s) Oral daily  morphine ER Tablet 60 milliGRAM(s) Oral at bedtime  SUMAtriptan 25 milliGRAM(s) Oral once  topiramate 100 milliGRAM(s) Oral two times a day    MEDICATIONS  (PRN):  sodium chloride 0.65% Nasal 1 Spray(s) Both Nostrils two times a day PRN Nasal Congestion      PHYSICAL EXAM  GENERAL: seen at bedside, daughter at bedside; NAD; well developed, well groomed; no signs of toxicity  HEENT: head atraumatic, normocephalic; anicteric; speech clear and fluent  NEURO: A + O X 3; good concentration; Cranial Nerves II- XII intact; SILT  RESPIRATORY: lungs clear to auscultation B/L; no rales or rhonchi   CARDIAC: regular cardiac rhythm; S1 S2   GI: abdomen soft, non distended; + bowel sounds; no BM; appetite good  : voiding  EXTREMITIES/ PV: PATE; + peripheral pulses; no cyanosis, edema or clubbing; + cap refill  MUSCULOSKELETAL: motor strength 5/5 B/L lower extremities; gait not assessed- preadmission independent ambulator  SKIN: no rashes, lesions    PSYCH: affect flat; mood good; good eye contact; no signs of depression or anxiety    Vital Signs:  T(C): 37.1 (08-30-22 @ 12:28)  HR: 97 (08-30-22 @ 15:45)  BP: 149/76 (08-30-22 @ 15:45)  RR: 19 (08-30-22 @ 12:28)  SpO2: 94% (08-30-22 @ 15:45)    Pertinent labs/radiology:    from: CT Head No Cont (08.28.22 @ 20:43)   IMPRESSION:  No CT evidence of acute intracranial hemorrhage, mass effect, or midline   shift. Age-related involutional and microvascular changes.                          10.5   9.50  )-----------( 324      ( 30 Aug 2022 07:26 )             31.1     08-30    135  |  98  |  28<H>  ----------------------------<  118<H>  4.4   |  26  |  0.85    Ca    9.9      30 Aug 2022 07:32  Phos  3.3     08-30  Mg     2.0     08-30    TPro  8.0  /  Alb  4.0  /  TBili  0.2  /  DBili  x   /  AST  17  /  ALT  13  /  AlkPhos  60  08-30

## 2022-08-30 NOTE — PROGRESS NOTE ADULT - ATTENDING COMMENTS
76F  w/ PMHx chronic back pain , HTN, T2DM, migraine, hypothyroid, Raynaud's, p/w AMS  x few days    Seen and examined at bedside. She is back to her baseline but complaining of headache. According to her friend at bedside, she has had a similar episode before and has taken extra pain medications before. She was present when the EMS came and felt that she seemed intoxicated by her medications.    Migraine Headache  Resume Topamax at 100mg BID - pt takes a total of 300mg at home which is increased for this indication but not the maximum dose.   Give Sumatriptan  F/u Neurology regarding continued management    AMS likely due to polypharmacy, resolved  Resume MS Contin at 30mg BID and add IR  Consult Pain Service  Contact pt's pain management  to discuss today    Hypovolemic Hyponatremia, resolved  discontinue IVF  Sodium is now 135    d/w pt and team in detail 76F  w/ PMHx chronic back pain , HTN, T2DM, migraine, hypothyroid, Raynaud's, p/w AMS  x few days    Seen and examined at bedside. She is back to her baseline but complaining of headache. According to her friend at bedside, she has had a similar episode before and has taken extra pain medications before. She was present when the EMS came and felt that she seemed intoxicated by her medications.    Migraine Headache  Resume Topamax at 100mg BID - pt takes a total of 300mg at home which is increased for this indication but not the maximum dose.   Give Sumatriptan PRN  F/u Neurology regarding continued management    AMS likely due to polypharmacy, resolved  Resume MS Contin at 30mg BID and add IR  Consult Pain Service  Contact pt's pain management  to discuss today    Hypovolemic Hyponatremia, resolved  discontinue IVF  Sodium is now 135    d/w pt and team in detail

## 2022-08-30 NOTE — CONSULT NOTE ADULT - CONSULT REASON
chronic back pain
Code stroke: "Feeling loopy," new AMS, neck pain, numbness/tingling x 4 extremities; unsteady gait noted. Per daughter the confusion and unsteady gait are all new.

## 2022-08-30 NOTE — PROGRESS NOTE ADULT - PROBLEM SELECTOR PLAN 5
- pt with no respiratory distress, saturating on RA, mild crackles in posterior lung bases BL   - CXR with LLL patchy opacity possibly consolidation vs. aspiration; RLL with hazy opacity with possible atelectasis   - VBG with metabolic acidosis, non-anion gap   - active T+S  - at risk for aspiration, however, mental status improving- no SIRS criteria, monitor off empiric abx

## 2022-08-30 NOTE — PHYSICAL THERAPY INITIAL EVALUATION ADULT - STANDING BALANCE: DYNAMIC, REHAB EVAL
please sign pt up for CONNECTED MOM, thanks:      Heaugust Orellana! It was great to see you today. I loved seeing Baby E on ultrasound!! :)     I spoke with my  and we're open to doing the connected mom's thing with the bp cuff etc. - I can't remember exactly what it was called.     Would you or one of your nurses let me know the next steps?     Thanks so much!     XO, Court    [facility] [employer]

## 2022-08-30 NOTE — PROGRESS NOTE ADULT - PROBLEM SELECTOR PLAN 8
- c/w home amlodipine 10 qd  - holding home ARB/HCTZ - c/w home amlodipine 10 qd  - will restart ARB, holding patient's home HCTZ

## 2022-08-30 NOTE — PROGRESS NOTE ADULT - SUBJECTIVE AND OBJECTIVE BOX
DATE OF SERVICE: 22 @ 06:34    Patient is a 76y old  Female who presents with a chief complaint of encephalopathy (29 Aug 2022 09:11)      SUBJECTIVE / OVERNIGHT EVENTS:  No acute events overnight, patient reports doing well and all questions answered at this time.     Additional Review of Systems:  Denies any other acute sx including f/c, HA, cough, sore throat, eye/ear changes, rhinorrhea, CP, palpitations, SOB, n/v, abdominal pain, back pain, bowel/bladder changes, fatigue, numbness or tingling.    MEDICATIONS  (STANDING):  amLODIPine   Tablet 10 milliGRAM(s) Oral daily  famotidine    Tablet 20 milliGRAM(s) Oral two times a day  folic acid 1 milliGRAM(s) Oral daily  heparin   Injectable 5000 Unit(s) SubCutaneous every 8 hours  levothyroxine 88 MICROGram(s) Oral daily  montelukast 10 milliGRAM(s) Oral daily    MEDICATIONS  (PRN):  morphine  IR 15 milliGRAM(s) Oral every 6 hours PRN Severe Pain (7 - 10)  sodium chloride 0.65% Nasal 1 Spray(s) Both Nostrils two times a day PRN Nasal Congestion      Vital Signs Last 24 Hrs  T(C): 36.5 (30 Aug 2022 04:30), Max: 37.1 (29 Aug 2022 19:50)  T(F): 97.7 (30 Aug 2022 04:30), Max: 98.7 (29 Aug 2022 19:50)  HR: 100 (30 Aug 2022 04:30) (95 - 105)  BP: 152/68 (30 Aug 2022 04:30) (118/67 - 158/76)  BP(mean): --  RR: 18 (30 Aug 2022 04:30) (18 - 18)  SpO2: 96% (30 Aug 2022 04:30) (91% - 96%)    Parameters below as of 29 Aug 2022 23:57  Patient On (Oxygen Delivery Method): room air      CAPILLARY BLOOD GLUCOSE      POCT Blood Glucose.: 144 mg/dL (29 Aug 2022 21:42)    I&O's Summary      PHYSICAL EXAM:  GENERAL: Clinically well-appearing and comfortable  HEAD:  Atraumatic, Normocephalic  EYES: EOMI, PERRLA, conjunctiva and sclera clear  NECK: Supple, No JVD  CHEST/LUNG: Clear to auscultation bilaterally; No wheeze  HEART: Regular rate and rhythm; No murmurs, rubs, or gallops  ABDOMEN: Soft, Nontender, Nondistended; Bowel sounds present  EXTREMITIES:  2+ Peripheral Pulses, No clubbing, cyanosis, or edema  PSYCH: Normal mood, Normal affect  NEUROLOGY: non-focal  SKIN: No rashes or lesions    LABS:                        9.1    8.61  )-----------( 247      ( 29 Aug 2022 04:47 )             27.1         129<L>  |  96  |  38<H>  ----------------------------<  126<H>  4.2   |  16<L>  |  0.98    Ca    9.8      29 Aug 2022 16:56  Phos  4.2       Mg     2.6         TPro  6.8  /  Alb  3.5  /  TBili  0.1<L>  /  DBili  x   /  AST  20  /  ALT  12  /  AlkPhos  48      PT/INR - ( 28 Aug 2022 20:45 )   PT: 11.5 sec;   INR: 1.00 ratio         PTT - ( 28 Aug 2022 20:45 )  PTT:31.7 sec      Urinalysis Basic - ( 28 Aug 2022 22:12 )    Color: Yellow / Appearance: Clear / S.014 / pH: x  Gluc: x / Ketone: Negative  / Bili: Negative / Urobili: Negative   Blood: x / Protein: Negative / Nitrite: Negative   Leuk Esterase: Negative / RBC: x / WBC x   Sq Epi: x / Non Sq Epi: x / Bacteria: x        RADIOLOGY & ADDITIONAL TESTS:    Imaging Personally Reviewed:    Consultant(s) Notes Reviewed:      Care Discussed with Consultants/Other Providers:   DATE OF SERVICE: 22 @ 06:34    Patient is a 76y old  Female who presents with a chief complaint of encephalopathy (29 Aug 2022 09:11)      SUBJECTIVE / OVERNIGHT EVENTS:  No acute events overnight, patient reports moderate left scapula pain and migraine, all questions answered at this time.     Additional Review of Systems:  Denies any other acute sx including f/c, cough, sore throat, eye/ear changes, rhinorrhea, CP, palpitations, SOB, n/v, abdominal pain, back pain, bowel/bladder changes, fatigue, numbness or tingling.    MEDICATIONS  (STANDING):  amLODIPine   Tablet 10 milliGRAM(s) Oral daily  famotidine    Tablet 20 milliGRAM(s) Oral two times a day  folic acid 1 milliGRAM(s) Oral daily  heparin   Injectable 5000 Unit(s) SubCutaneous every 8 hours  levothyroxine 88 MICROGram(s) Oral daily  montelukast 10 milliGRAM(s) Oral daily    MEDICATIONS  (PRN):  morphine  IR 15 milliGRAM(s) Oral every 6 hours PRN Severe Pain (7 - 10)  sodium chloride 0.65% Nasal 1 Spray(s) Both Nostrils two times a day PRN Nasal Congestion      Vital Signs Last 24 Hrs  T(C): 36.5 (30 Aug 2022 04:30), Max: 37.1 (29 Aug 2022 19:50)  T(F): 97.7 (30 Aug 2022 04:30), Max: 98.7 (29 Aug 2022 19:50)  HR: 100 (30 Aug 2022 04:30) (95 - 105)  BP: 152/68 (30 Aug 2022 04:30) (118/67 - 158/76)  BP(mean): --  RR: 18 (30 Aug 2022 04:30) (18 - 18)  SpO2: 96% (30 Aug 2022 04:30) (91% - 96%)    Parameters below as of 29 Aug 2022 23:57  Patient On (Oxygen Delivery Method): room air      CAPILLARY BLOOD GLUCOSE      POCT Blood Glucose.: 144 mg/dL (29 Aug 2022 21:42)    I&O's Summary      PHYSICAL EXAM:  GENERAL: Elderly female, comfortable NAD  HEAD:  Atraumatic, Normocephalic  EYES: EOMI, PERRLA, conjunctiva and sclera clear  NECK: Supple, No JVD  CHEST/LUNG: Clear to auscultation bilaterally; No wheeze  BACK: Non-TTP over left scapula  HEART: Tachycardic rate regular rhythm; No murmurs, rubs, or gallops  ABDOMEN: Soft, Nontender, Nondistended; Bowel sounds present  EXTREMITIES:  2+ Peripheral Pulses, No clubbing, cyanosis, or edema  PSYCH: Normal mood, Normal affect  NEUROLOGY: non-focal, moving all four, CN grossly intact, good strength and sensation globally  SKIN: No rashes or lesions    LABS:                        9.1    8.61  )-----------( 247      ( 29 Aug 2022 04:47 )             27.1     08-    129<L>  |  96  |  38<H>  ----------------------------<  126<H>  4.2   |  16<L>  |  0.98    Ca    9.8      29 Aug 2022 16:56  Phos  4.2       Mg     2.6         TPro  6.8  /  Alb  3.5  /  TBili  0.1<L>  /  DBili  x   /  AST  20  /  ALT  12  /  AlkPhos  48  08-    PT/INR - ( 28 Aug 2022 20:45 )   PT: 11.5 sec;   INR: 1.00 ratio         PTT - ( 28 Aug 2022 20:45 )  PTT:31.7 sec      Urinalysis Basic - ( 28 Aug 2022 22:12 )    Color: Yellow / Appearance: Clear / S.014 / pH: x  Gluc: x / Ketone: Negative  / Bili: Negative / Urobili: Negative   Blood: x / Protein: Negative / Nitrite: Negative   Leuk Esterase: Negative / RBC: x / WBC x   Sq Epi: x / Non Sq Epi: x / Bacteria: x        RADIOLOGY & ADDITIONAL TESTS:    Imaging Personally Reviewed:    Consultant(s) Notes Reviewed:      Care Discussed with Consultants/Other Providers:   DATE OF SERVICE: 22 @ 06:34    Patient is a 76y old  Female who presents with a chief complaint of encephalopathy (29 Aug 2022 09:11)      SUBJECTIVE / OVERNIGHT EVENTS:  No acute events overnight, patient reports moderate left scapula pain and migraine, all questions answered at this time.     Additional Review of Systems:  Denies any other acute sx including f/c, cough, sore throat, eye/ear changes, rhinorrhea, CP, palpitations, SOB, n/v, abdominal pain, back pain, bowel/bladder changes, fatigue, numbness or tingling.    MEDICATIONS  (STANDING):  amLODIPine   Tablet 10 milliGRAM(s) Oral daily  famotidine    Tablet 20 milliGRAM(s) Oral two times a day  folic acid 1 milliGRAM(s) Oral daily  heparin   Injectable 5000 Unit(s) SubCutaneous every 8 hours  levothyroxine 88 MICROGram(s) Oral daily  montelukast 10 milliGRAM(s) Oral daily    MEDICATIONS  (PRN):  morphine  IR 15 milliGRAM(s) Oral every 6 hours PRN Severe Pain (7 - 10)  sodium chloride 0.65% Nasal 1 Spray(s) Both Nostrils two times a day PRN Nasal Congestion      Vital Signs Last 24 Hrs  T(C): 36.5 (30 Aug 2022 04:30), Max: 37.1 (29 Aug 2022 19:50)  T(F): 97.7 (30 Aug 2022 04:30), Max: 98.7 (29 Aug 2022 19:50)  HR: 100 (30 Aug 2022 04:30) (95 - 105)  BP: 152/68 (30 Aug 2022 04:30) (118/67 - 158/76)  BP(mean): --  RR: 18 (30 Aug 2022 04:30) (18 - 18)  SpO2: 96% (30 Aug 2022 04:30) (91% - 96%)    Parameters below as of 29 Aug 2022 23:57  Patient On (Oxygen Delivery Method): room air      CAPILLARY BLOOD GLUCOSE      POCT Blood Glucose.: 144 mg/dL (29 Aug 2022 21:42)    I&O's Summary      PHYSICAL EXAM:  GENERAL: Elderly female, comfortable NAD  HEAD:  Atraumatic, Normocephalic  EYES: EOMI, PERRLA, conjunctiva and sclera clear  NECK: Supple, No JVD  CHEST/LUNG: Clear to auscultation bilaterally; No wheeze  BACK: Non-TTP over left scapula  HEART: Tachycardic rate regular rhythm; No murmurs, rubs, or gallops  ABDOMEN: Soft, Nontender, Nondistended; Bowel sounds present  EXTREMITIES:  2+ Peripheral Pulses, No clubbing, cyanosis, or edema  PSYCH: Normal mood, Normal affect  NEUROLOGY: A&Ox4, non-focal, moving all four, CN grossly intact, good strength and sensation globally  SKIN: No rashes or lesions    LABS:                        9.1    8.61  )-----------( 247      ( 29 Aug 2022 04:47 )             27.1     08-    129<L>  |  96  |  38<H>  ----------------------------<  126<H>  4.2   |  16<L>  |  0.98    Ca    9.8      29 Aug 2022 16:56  Phos  4.2       Mg     2.6         TPro  6.8  /  Alb  3.5  /  TBili  0.1<L>  /  DBili  x   /  AST  20  /  ALT  12  /  AlkPhos  48  08-    PT/INR - ( 28 Aug 2022 20:45 )   PT: 11.5 sec;   INR: 1.00 ratio         PTT - ( 28 Aug 2022 20:45 )  PTT:31.7 sec      Urinalysis Basic - ( 28 Aug 2022 22:12 )    Color: Yellow / Appearance: Clear / S.014 / pH: x  Gluc: x / Ketone: Negative  / Bili: Negative / Urobili: Negative   Blood: x / Protein: Negative / Nitrite: Negative   Leuk Esterase: Negative / RBC: x / WBC x   Sq Epi: x / Non Sq Epi: x / Bacteria: x        RADIOLOGY & ADDITIONAL TESTS:    Imaging Personally Reviewed:    Consultant(s) Notes Reviewed:      Care Discussed with Consultants/Other Providers:

## 2022-08-31 ENCOUNTER — TRANSCRIPTION ENCOUNTER (OUTPATIENT)
Age: 77
End: 2022-08-31

## 2022-08-31 VITALS
HEART RATE: 96 BPM | RESPIRATION RATE: 18 BRPM | OXYGEN SATURATION: 94 % | TEMPERATURE: 98 F | DIASTOLIC BLOOD PRESSURE: 75 MMHG | SYSTOLIC BLOOD PRESSURE: 155 MMHG

## 2022-08-31 LAB
ALBUMIN SERPL ELPH-MCNC: 4.1 G/DL — SIGNIFICANT CHANGE UP (ref 3.3–5)
ALP SERPL-CCNC: 61 U/L — SIGNIFICANT CHANGE UP (ref 40–120)
ALT FLD-CCNC: 12 U/L — SIGNIFICANT CHANGE UP (ref 10–45)
ANION GAP SERPL CALC-SCNC: 13 MMOL/L — SIGNIFICANT CHANGE UP (ref 5–17)
AST SERPL-CCNC: 19 U/L — SIGNIFICANT CHANGE UP (ref 10–40)
BASOPHILS # BLD AUTO: 0.06 K/UL — SIGNIFICANT CHANGE UP (ref 0–0.2)
BASOPHILS NFR BLD AUTO: 0.7 % — SIGNIFICANT CHANGE UP (ref 0–2)
BILIRUB SERPL-MCNC: 0.3 MG/DL — SIGNIFICANT CHANGE UP (ref 0.2–1.2)
BUN SERPL-MCNC: 22 MG/DL — SIGNIFICANT CHANGE UP (ref 7–23)
CALCIUM SERPL-MCNC: 10 MG/DL — SIGNIFICANT CHANGE UP (ref 8.4–10.5)
CHLORIDE SERPL-SCNC: 98 MMOL/L — SIGNIFICANT CHANGE UP (ref 96–108)
CO2 SERPL-SCNC: 24 MMOL/L — SIGNIFICANT CHANGE UP (ref 22–31)
CREAT SERPL-MCNC: 0.81 MG/DL — SIGNIFICANT CHANGE UP (ref 0.5–1.3)
EGFR: 74 ML/MIN/1.73M2 — SIGNIFICANT CHANGE UP
EOSINOPHIL # BLD AUTO: 0.13 K/UL — SIGNIFICANT CHANGE UP (ref 0–0.5)
EOSINOPHIL NFR BLD AUTO: 1.5 % — SIGNIFICANT CHANGE UP (ref 0–6)
GLUCOSE SERPL-MCNC: 86 MG/DL — SIGNIFICANT CHANGE UP (ref 70–99)
HCT VFR BLD CALC: 34.7 % — SIGNIFICANT CHANGE UP (ref 34.5–45)
HGB BLD-MCNC: 11.5 G/DL — SIGNIFICANT CHANGE UP (ref 11.5–15.5)
IMM GRANULOCYTES NFR BLD AUTO: 0.8 % — SIGNIFICANT CHANGE UP (ref 0–1.5)
LYMPHOCYTES # BLD AUTO: 2.32 K/UL — SIGNIFICANT CHANGE UP (ref 1–3.3)
LYMPHOCYTES # BLD AUTO: 26.5 % — SIGNIFICANT CHANGE UP (ref 13–44)
MAGNESIUM SERPL-MCNC: 2 MG/DL — SIGNIFICANT CHANGE UP (ref 1.6–2.6)
MCHC RBC-ENTMCNC: 30.6 PG — SIGNIFICANT CHANGE UP (ref 27–34)
MCHC RBC-ENTMCNC: 33.1 GM/DL — SIGNIFICANT CHANGE UP (ref 32–36)
MCV RBC AUTO: 92.3 FL — SIGNIFICANT CHANGE UP (ref 80–100)
MONOCYTES # BLD AUTO: 0.96 K/UL — HIGH (ref 0–0.9)
MONOCYTES NFR BLD AUTO: 11 % — SIGNIFICANT CHANGE UP (ref 2–14)
NEUTROPHILS # BLD AUTO: 5.21 K/UL — SIGNIFICANT CHANGE UP (ref 1.8–7.4)
NEUTROPHILS NFR BLD AUTO: 59.5 % — SIGNIFICANT CHANGE UP (ref 43–77)
NRBC # BLD: 0 /100 WBCS — SIGNIFICANT CHANGE UP (ref 0–0)
PHOSPHATE SERPL-MCNC: 2.6 MG/DL — SIGNIFICANT CHANGE UP (ref 2.5–4.5)
PLATELET # BLD AUTO: 341 K/UL — SIGNIFICANT CHANGE UP (ref 150–400)
POTASSIUM SERPL-MCNC: 3.9 MMOL/L — SIGNIFICANT CHANGE UP (ref 3.5–5.3)
POTASSIUM SERPL-SCNC: 3.9 MMOL/L — SIGNIFICANT CHANGE UP (ref 3.5–5.3)
PROT SERPL-MCNC: 8.3 G/DL — SIGNIFICANT CHANGE UP (ref 6–8.3)
RBC # BLD: 3.76 M/UL — LOW (ref 3.8–5.2)
RBC # FLD: 14.4 % — SIGNIFICANT CHANGE UP (ref 10.3–14.5)
SODIUM SERPL-SCNC: 135 MMOL/L — SIGNIFICANT CHANGE UP (ref 135–145)
WBC # BLD: 8.75 K/UL — SIGNIFICANT CHANGE UP (ref 3.8–10.5)
WBC # FLD AUTO: 8.75 K/UL — SIGNIFICANT CHANGE UP (ref 3.8–10.5)

## 2022-08-31 PROCEDURE — 99239 HOSP IP/OBS DSCHRG MGMT >30: CPT

## 2022-08-31 PROCEDURE — 87637 SARSCOV2&INF A&B&RSV AMP PRB: CPT

## 2022-08-31 PROCEDURE — 84436 ASSAY OF TOTAL THYROXINE: CPT

## 2022-08-31 PROCEDURE — 84484 ASSAY OF TROPONIN QUANT: CPT

## 2022-08-31 PROCEDURE — 80307 DRUG TEST PRSMV CHEM ANLYZR: CPT

## 2022-08-31 PROCEDURE — 85018 HEMOGLOBIN: CPT

## 2022-08-31 PROCEDURE — 86780 TREPONEMA PALLIDUM: CPT

## 2022-08-31 PROCEDURE — 85610 PROTHROMBIN TIME: CPT

## 2022-08-31 PROCEDURE — 71045 X-RAY EXAM CHEST 1 VIEW: CPT

## 2022-08-31 PROCEDURE — 70450 CT HEAD/BRAIN W/O DYE: CPT | Mod: MA

## 2022-08-31 PROCEDURE — 36415 COLL VENOUS BLD VENIPUNCTURE: CPT

## 2022-08-31 PROCEDURE — 82435 ASSAY OF BLOOD CHLORIDE: CPT

## 2022-08-31 PROCEDURE — 83550 IRON BINDING TEST: CPT

## 2022-08-31 PROCEDURE — 86901 BLOOD TYPING SEROLOGIC RH(D): CPT

## 2022-08-31 PROCEDURE — 82962 GLUCOSE BLOOD TEST: CPT

## 2022-08-31 PROCEDURE — 83930 ASSAY OF BLOOD OSMOLALITY: CPT

## 2022-08-31 PROCEDURE — 82436 ASSAY OF URINE CHLORIDE: CPT

## 2022-08-31 PROCEDURE — 82570 ASSAY OF URINE CREATININE: CPT

## 2022-08-31 PROCEDURE — 84300 ASSAY OF URINE SODIUM: CPT

## 2022-08-31 PROCEDURE — 82330 ASSAY OF CALCIUM: CPT

## 2022-08-31 PROCEDURE — 82947 ASSAY GLUCOSE BLOOD QUANT: CPT

## 2022-08-31 PROCEDURE — 83540 ASSAY OF IRON: CPT

## 2022-08-31 PROCEDURE — 82140 ASSAY OF AMMONIA: CPT

## 2022-08-31 PROCEDURE — 83735 ASSAY OF MAGNESIUM: CPT

## 2022-08-31 PROCEDURE — 83935 ASSAY OF URINE OSMOLALITY: CPT

## 2022-08-31 PROCEDURE — 85027 COMPLETE CBC AUTOMATED: CPT

## 2022-08-31 PROCEDURE — 84100 ASSAY OF PHOSPHORUS: CPT

## 2022-08-31 PROCEDURE — 80061 LIPID PANEL: CPT

## 2022-08-31 PROCEDURE — 82565 ASSAY OF CREATININE: CPT

## 2022-08-31 PROCEDURE — 86900 BLOOD TYPING SEROLOGIC ABO: CPT

## 2022-08-31 PROCEDURE — 80048 BASIC METABOLIC PNL TOTAL CA: CPT

## 2022-08-31 PROCEDURE — 84540 ASSAY OF URINE/UREA-N: CPT

## 2022-08-31 PROCEDURE — 82746 ASSAY OF FOLIC ACID SERUM: CPT

## 2022-08-31 PROCEDURE — 86850 RBC ANTIBODY SCREEN: CPT

## 2022-08-31 PROCEDURE — 82607 VITAMIN B-12: CPT

## 2022-08-31 PROCEDURE — 82728 ASSAY OF FERRITIN: CPT

## 2022-08-31 PROCEDURE — 83605 ASSAY OF LACTIC ACID: CPT

## 2022-08-31 PROCEDURE — 84295 ASSAY OF SERUM SODIUM: CPT

## 2022-08-31 PROCEDURE — 85025 COMPLETE CBC W/AUTO DIFF WBC: CPT

## 2022-08-31 PROCEDURE — 84443 ASSAY THYROID STIM HORMONE: CPT

## 2022-08-31 PROCEDURE — 84132 ASSAY OF SERUM POTASSIUM: CPT

## 2022-08-31 PROCEDURE — 82803 BLOOD GASES ANY COMBINATION: CPT

## 2022-08-31 PROCEDURE — 85730 THROMBOPLASTIN TIME PARTIAL: CPT

## 2022-08-31 PROCEDURE — 80053 COMPREHEN METABOLIC PANEL: CPT

## 2022-08-31 PROCEDURE — 99285 EMERGENCY DEPT VISIT HI MDM: CPT | Mod: 25

## 2022-08-31 PROCEDURE — 85014 HEMATOCRIT: CPT

## 2022-08-31 PROCEDURE — 81003 URINALYSIS AUTO W/O SCOPE: CPT

## 2022-08-31 PROCEDURE — 97161 PT EVAL LOW COMPLEX 20 MIN: CPT

## 2022-08-31 RX ORDER — TOPIRAMATE 25 MG
3 TABLET ORAL
Qty: 0 | Refills: 0 | DISCHARGE

## 2022-08-31 RX ORDER — SUMATRIPTAN SUCCINATE 4 MG/.5ML
1 INJECTION, SOLUTION SUBCUTANEOUS
Qty: 5 | Refills: 0
Start: 2022-08-31 | End: 2022-09-04

## 2022-08-31 RX ORDER — TOPIRAMATE 25 MG
2 TABLET ORAL
Qty: 0 | Refills: 0 | DISCHARGE

## 2022-08-31 RX ORDER — GABAPENTIN 400 MG/1
1 CAPSULE ORAL
Qty: 0 | Refills: 0 | DISCHARGE
Start: 2022-08-31

## 2022-08-31 RX ORDER — SUMATRIPTAN SUCCINATE 4 MG/.5ML
25 INJECTION, SOLUTION SUBCUTANEOUS ONCE
Refills: 0 | Status: COMPLETED | OUTPATIENT
Start: 2022-08-31 | End: 2022-08-31

## 2022-08-31 RX ORDER — CYCLOBENZAPRINE HYDROCHLORIDE 10 MG/1
1 TABLET, FILM COATED ORAL
Qty: 0 | Refills: 0 | DISCHARGE

## 2022-08-31 RX ORDER — MORPHINE SULFATE 50 MG/1
1 CAPSULE, EXTENDED RELEASE ORAL
Qty: 0 | Refills: 0 | DISCHARGE

## 2022-08-31 RX ORDER — GABAPENTIN 400 MG/1
1 CAPSULE ORAL
Qty: 0 | Refills: 0 | DISCHARGE

## 2022-08-31 RX ORDER — MORPHINE SULFATE 50 MG/1
1 CAPSULE, EXTENDED RELEASE ORAL
Qty: 0 | Refills: 0 | DISCHARGE
Start: 2022-08-31

## 2022-08-31 RX ORDER — SENNA PLUS 8.6 MG/1
2 TABLET ORAL
Qty: 0 | Refills: 0 | DISCHARGE

## 2022-08-31 RX ADMIN — MONTELUKAST 10 MILLIGRAM(S): 4 TABLET, CHEWABLE ORAL at 12:27

## 2022-08-31 RX ADMIN — HEPARIN SODIUM 5000 UNIT(S): 5000 INJECTION INTRAVENOUS; SUBCUTANEOUS at 05:44

## 2022-08-31 RX ADMIN — Medication 88 MICROGRAM(S): at 05:45

## 2022-08-31 RX ADMIN — FAMOTIDINE 20 MILLIGRAM(S): 10 INJECTION INTRAVENOUS at 12:28

## 2022-08-31 RX ADMIN — AMLODIPINE BESYLATE 10 MILLIGRAM(S): 2.5 TABLET ORAL at 05:45

## 2022-08-31 RX ADMIN — LOSARTAN POTASSIUM 100 MILLIGRAM(S): 100 TABLET, FILM COATED ORAL at 05:45

## 2022-08-31 RX ADMIN — LIDOCAINE 2 PATCH: 4 CREAM TOPICAL at 05:55

## 2022-08-31 RX ADMIN — Medication 100 MILLIGRAM(S): at 05:45

## 2022-08-31 RX ADMIN — HEPARIN SODIUM 5000 UNIT(S): 5000 INJECTION INTRAVENOUS; SUBCUTANEOUS at 13:46

## 2022-08-31 RX ADMIN — LIDOCAINE 1 PATCH: 4 CREAM TOPICAL at 00:54

## 2022-08-31 RX ADMIN — Medication 1 MILLIGRAM(S): at 12:27

## 2022-08-31 RX ADMIN — SUMATRIPTAN SUCCINATE 25 MILLIGRAM(S): 4 INJECTION, SOLUTION SUBCUTANEOUS at 13:46

## 2022-08-31 RX ADMIN — LIDOCAINE 2 PATCH: 4 CREAM TOPICAL at 12:28

## 2022-08-31 NOTE — PROGRESS NOTE ADULT - PROBLEM SELECTOR PLAN 7
- h/o of multiple spinal surgeries and taras placement   - s/p RLQ medical stimulator device for pain management   - NOT MRI compatible*  - follow pain management plan as above

## 2022-08-31 NOTE — PROGRESS NOTE ADULT - PROBLEM SELECTOR PLAN 3
- h/o of chronic marijuana use, still uses  - h/o cocaine/heroin use in 70s - h/o of chronic marijuana use, still uses intermittently  - h/o cocaine/heroin use in 70s

## 2022-08-31 NOTE — DISCHARGE NOTE PROVIDER - NSDCCPCAREPLAN_GEN_ALL_CORE_FT
PRINCIPAL DISCHARGE DIAGNOSIS  Diagnosis: Hyponatremia  Assessment and Plan of Treatment: You arrived confused and found to have an mild to moderately low sodium level in your blood, which in your case was likely due to drowsiness and confusion due to inappropriate dosing of some of your medications. This confusion likely contributed to poor fluid and food intake, which ultimately lead to the low sodium level. We ensured that you were not suffering a stroke, and we replaced your low sodium through IV fluids. It is important for you to continue your medications as prescribed and to stop taking muscle relaxants (namely Flexeril and Skelaxin), which can significantly contribute to drowsiness.      SECONDARY DISCHARGE DIAGNOSES  Diagnosis: Metabolic encephalopathy  Assessment and Plan of Treatment: Encephalopathy means that you were confused and not acting as your normal self. Metabolic indicates that this was likely due to chemical abnormalities, such as your abnormal sodium level.     PRINCIPAL DISCHARGE DIAGNOSIS  Diagnosis: Hyponatremia  Assessment and Plan of Treatment: You arrived confused and found to have an mild to moderately low sodium level in your blood, which in your case was likely due to drowsiness and confusion due to inappropriate dosing of some of your medications. This confusion likely contributed to poor fluid and food intake, which ultimately lead to the low sodium level. We ensured that you were not suffering a stroke, and we replaced your low sodium through IV fluids. Our pain management service was consulted to assist with the optimal pain regimen for you while in the hospital, and neurology was consulted for your migraines and will see you in the office. It is important for you to follow-up with your primary care physician, pain management physician, and now neurology. Please continue your medications as prescribed and do not take muscle relaxants (namely Flexeril and Skelaxin), which can significantly contribute to drowsiness.      SECONDARY DISCHARGE DIAGNOSES  Diagnosis: Metabolic encephalopathy  Assessment and Plan of Treatment: Encephalopathy means that you were confused and not acting as your normal self. Metabolic indicates that this was likely due to chemical abnormalities, such as your abnormal sodium level.

## 2022-08-31 NOTE — DISCHARGE NOTE PROVIDER - NSDCFUSCHEDAPPT_GEN_ALL_CORE_FT
Heriberto Buenrostro  [facility] Physician [facility]  ORTHOSURG [address]  Scheduled Appointment: 09/21/2022    Chance Robertson  [facility]  PULMMED [address] [facility]  Scheduled Appointment: 11/17/2022

## 2022-08-31 NOTE — DISCHARGE NOTE NURSING/CASE MANAGEMENT/SOCIAL WORK - NSDCPEFALRISK_GEN_ALL_CORE
For information on Fall & Injury Prevention, visit: [email or web address] OR  [email or web address] OR  https://www.cdc.gov/steadi/patient.html

## 2022-08-31 NOTE — DISCHARGE NOTE PROVIDER - PROVIDER TOKENS
PROVIDER:[TOKEN:[[id number]],FOLLOWUP:[2 weeks]] PROVIDER:[TOKEN:[[id number]],FOLLOWUP:[2 weeks]],PROVIDER:[TOKEN:[[id number]],FOLLOWUP:[2 weeks]] PROVIDER:[TOKEN:[[id number]],FOLLOWUP:[2 weeks]],PROVIDER:[TOKEN:[[id number]],FOLLOWUP:[2 weeks]],PROVIDER:[TOKEN:[[id number]],FOLLOWUP:[1 week]]

## 2022-08-31 NOTE — DISCHARGE NOTE NURSING/CASE MANAGEMENT/SOCIAL WORK - PATIENT PORTAL LINK FT
You can access the FollowMyHealth Patient Portal offered by [facility] by registering at the following website: [email or web address]. By joining [facility]’s FollowMyHealth portal, you will also be able to view your health information using other applications (apps) compatible with our system.

## 2022-08-31 NOTE — PROGRESS NOTE ADULT - SUBJECTIVE AND OBJECTIVE BOX
DATE OF SERVICE: 08-31-22 @ 06:35    Patient is a 76y old  Female who presents with a chief complaint of encephalopathy (30 Aug 2022 17:02)      SUBJECTIVE / OVERNIGHT EVENTS:  No acute events overnight, patient reports doing well and all questions answered at this time.     Additional Review of Systems:  Denies any other acute sx including f/c, HA, cough, sore throat, eye/ear changes, rhinorrhea, CP, palpitations, SOB, n/v, abdominal pain, back pain, bowel/bladder changes, fatigue, numbness or tingling.    MEDICATIONS  (STANDING):  amLODIPine   Tablet 10 milliGRAM(s) Oral daily  famotidine    Tablet 20 milliGRAM(s) Oral daily  folic acid 1 milliGRAM(s) Oral daily  gabapentin 400 milliGRAM(s) Oral at bedtime  heparin   Injectable 5000 Unit(s) SubCutaneous every 8 hours  levothyroxine 88 MICROGram(s) Oral daily  lidocaine   4% Patch 2 Patch Transdermal daily  losartan 100 milliGRAM(s) Oral daily  montelukast 10 milliGRAM(s) Oral daily  morphine ER Tablet 60 milliGRAM(s) Oral at bedtime  topiramate 100 milliGRAM(s) Oral two times a day    MEDICATIONS  (PRN):  sodium chloride 0.65% Nasal 1 Spray(s) Both Nostrils two times a day PRN Nasal Congestion      Vital Signs Last 24 Hrs  T(C): 36.6 (31 Aug 2022 05:31), Max: 37.1 (30 Aug 2022 12:28)  T(F): 97.9 (31 Aug 2022 05:31), Max: 98.8 (30 Aug 2022 12:28)  HR: 86 (31 Aug 2022 05:31) (86 - 99)  BP: 150/68 (31 Aug 2022 05:31) (149/76 - 161/76)  BP(mean): --  RR: 18 (31 Aug 2022 05:31) (18 - 19)  SpO2: 98% (31 Aug 2022 05:31) (93% - 98%)    Parameters below as of 31 Aug 2022 05:31  Patient On (Oxygen Delivery Method): room air      CAPILLARY BLOOD GLUCOSE      POCT Blood Glucose.: 100 mg/dL (30 Aug 2022 22:12)  POCT Blood Glucose.: 128 mg/dL (30 Aug 2022 13:30)  POCT Blood Glucose.: 121 mg/dL (30 Aug 2022 09:09)    I&O's Summary    30 Aug 2022 07:01  -  31 Aug 2022 06:35  --------------------------------------------------------  IN: 830 mL / OUT: 1502 mL / NET: -672 mL        PHYSICAL EXAM:  GENERAL: Clinically well-appearing and comfortable  HEAD:  Atraumatic, Normocephalic  EYES: EOMI, PERRLA, conjunctiva and sclera clear  NECK: Supple, No JVD  CHEST/LUNG: Clear to auscultation bilaterally; No wheeze  HEART: Regular rate and rhythm; No murmurs, rubs, or gallops  ABDOMEN: Soft, Nontender, Nondistended; Bowel sounds present  EXTREMITIES:  2+ Peripheral Pulses, No clubbing, cyanosis, or edema  PSYCH: Normal mood, Normal affect  NEUROLOGY: non-focal  SKIN: No rashes or lesions    LABS:                        10.5   9.50  )-----------( 324      ( 30 Aug 2022 07:26 )             31.1     08-30    135  |  98  |  28<H>  ----------------------------<  118<H>  4.4   |  26  |  0.85    Ca    9.9      30 Aug 2022 07:32  Phos  3.3     08-30  Mg     2.0     08-30    TPro  8.0  /  Alb  4.0  /  TBili  0.2  /  DBili  x   /  AST  17  /  ALT  13  /  AlkPhos  60  08-30              RADIOLOGY & ADDITIONAL TESTS:    Imaging Personally Reviewed:    Consultant(s) Notes Reviewed:      Care Discussed with Consultants/Other Providers:   DATE OF SERVICE: 08-31-22 @ 06:35    Patient is a 76y old  Female who presents with a chief complaint of encephalopathy (30 Aug 2022 17:02)      SUBJECTIVE / OVERNIGHT EVENTS:  No acute events overnight, patient reports her back pain is much improved, and all questions answered at this time.     Additional Review of Systems:  Denies any other acute sx including f/c, HA, cough, sore throat, eye/ear changes, rhinorrhea, CP, palpitations, SOB, n/v, abdominal pain, back pain, bowel/bladder changes, fatigue, numbness or tingling.    MEDICATIONS  (STANDING):  amLODIPine   Tablet 10 milliGRAM(s) Oral daily  famotidine    Tablet 20 milliGRAM(s) Oral daily  folic acid 1 milliGRAM(s) Oral daily  gabapentin 400 milliGRAM(s) Oral at bedtime  heparin   Injectable 5000 Unit(s) SubCutaneous every 8 hours  levothyroxine 88 MICROGram(s) Oral daily  lidocaine   4% Patch 2 Patch Transdermal daily  losartan 100 milliGRAM(s) Oral daily  montelukast 10 milliGRAM(s) Oral daily  morphine ER Tablet 60 milliGRAM(s) Oral at bedtime  topiramate 100 milliGRAM(s) Oral two times a day    MEDICATIONS  (PRN):  sodium chloride 0.65% Nasal 1 Spray(s) Both Nostrils two times a day PRN Nasal Congestion      Vital Signs Last 24 Hrs  T(C): 36.6 (31 Aug 2022 05:31), Max: 37.1 (30 Aug 2022 12:28)  T(F): 97.9 (31 Aug 2022 05:31), Max: 98.8 (30 Aug 2022 12:28)  HR: 86 (31 Aug 2022 05:31) (86 - 99)  BP: 150/68 (31 Aug 2022 05:31) (149/76 - 161/76)  BP(mean): --  RR: 18 (31 Aug 2022 05:31) (18 - 19)  SpO2: 98% (31 Aug 2022 05:31) (93% - 98%)    Parameters below as of 31 Aug 2022 05:31  Patient On (Oxygen Delivery Method): room air      CAPILLARY BLOOD GLUCOSE      POCT Blood Glucose.: 100 mg/dL (30 Aug 2022 22:12)  POCT Blood Glucose.: 128 mg/dL (30 Aug 2022 13:30)  POCT Blood Glucose.: 121 mg/dL (30 Aug 2022 09:09)    I&O's Summary    30 Aug 2022 07:01  -  31 Aug 2022 06:35  --------------------------------------------------------  IN: 830 mL / OUT: 1502 mL / NET: -672 mL        PHYSICAL EXAM:  GENERAL: Clinically well-appearing and comfortable  HEAD:  Atraumatic, Normocephalic  EYES: EOMI, PERRLA, conjunctiva and sclera clear  NECK: Supple, No JVD  CHEST/LUNG: Clear to auscultation bilaterally; No wheeze  HEART: Regular rate and rhythm; No murmurs, rubs, or gallops  ABDOMEN: Soft, Nontender, Nondistended; Bowel sounds present  EXTREMITIES:  2+ Peripheral Pulses, No clubbing, cyanosis, or edema  PSYCH: Normal mood, Normal affect  NEUROLOGY: A&Ox4, non-focal, moving all extremities, strength and sensation normal throughout  SKIN: No rashes or lesions    LABS:                        10.5   9.50  )-----------( 324      ( 30 Aug 2022 07:26 )             31.1     08-30    135  |  98  |  28<H>  ----------------------------<  118<H>  4.4   |  26  |  0.85    Ca    9.9      30 Aug 2022 07:32  Phos  3.3     08-30  Mg     2.0     08-30    TPro  8.0  /  Alb  4.0  /  TBili  0.2  /  DBili  x   /  AST  17  /  ALT  13  /  AlkPhos  60  08-30              RADIOLOGY & ADDITIONAL TESTS:    Imaging Personally Reviewed:    Consultant(s) Notes Reviewed:      Care Discussed with Consultants/Other Providers:

## 2022-08-31 NOTE — PROGRESS NOTE ADULT - ASSESSMENT
76 year old female w/ PMHx chronic back pain (s/p multiple spinal surgeries, including cervical ACDF and lumbar laminectomy, on morphine 60mg QD), HTN, T2DM, migraine, hypothyroid, Raynaud who presents with metabolic encephalopathy most likely i/s/o hyponatremia, improving s/p volume resuscitation 76 year old female w/ PMHx chronic back pain (s/p multiple spinal surgeries, including cervical ACDF and lumbar laminectomy, neurostimulator implant, on chronic pain medication), HTN, T2DM, migraine, hypothyroid, Raynaud who presents with metabolic encephalopathy most likely i/s/o polypharmacy c/b hypovolemic hyponatremia

## 2022-08-31 NOTE — DISCHARGE NOTE PROVIDER - HOSPITAL COURSE
76 year old female w/ PMHx chronic back pain (s/p multiple spinal surgeries, including cervical ACDF and lumbar laminectomy, neurostimulator implant, on chronic pain medication), HTN, T2DM, migraine, hypothyroid, Raynaud who presents with metabolic encephalopathy most likely i/s/o polypharmacy c/b hypovolemic hyponatremia.    76 year old female w/ PMHx chronic back pain (s/p multiple spinal surgeries, including cervical ACDF and lumbar laminectomy, neurostimulator implant, on chronic pain medication), HTN, T2DM, migraine, hypothyroid, Raynaud who presents with metabolic encephalopathy most likely i/s/o polypharmacy c/b hypovolemic hyponatremia.     Patient was r/o for acute bleed with negative CT Head and labs were notable for mild hyponatremia to 124 which was corrected with volume resuscitation with concurrent resolution of patient's encephalopathy. Given suspicion of polypharmacy involving topiramate, gabapentin, multiple muscle relaxants, along with opioids, chronic pain service was consulted which confirmed patient's inappropriate dosing. CT chest revealed patchy opacities but patient did not meet SIRS criteria and without significant clinical symptoms, thus monitored off antibiotics. Patient also had RENNY likely 2/2 to hypovolemia due to her AMS, which rapidly corrected with volume resuscitation. Topiramate was held in setting of RENNY, and neurology was consulted for recommendations regarding migraine management and agreed with primary team's management with sumatriptan acutely, followed by resumption of topiramate once kidney function improved with plan to manage on an outpatient basis. Decision was made to discharge patient as she was back to baseline mentation and verbalized understanding of how to properly dose medications and otherwise remain well-hydrated.

## 2022-08-31 NOTE — PROGRESS NOTE ADULT - PROBLEM SELECTOR PLAN 2
- Serum Na 124 (no hyperglycemic correction indicated), serum osm low  - FeNa 0.2% pre-renal (hypovolemic)  - most likely hypovolemic, hypotonic hyponatremia, no elevated TG or lipid panel (no additional solutes)  - cw volume expansion s/p  cc (Na correction 414 rate)  - goal Na ~6-8 meq correction/24hr  - CTM for headache, sz precautions, neurological changes  - f/u CMP now q24  RESOLVED

## 2022-08-31 NOTE — CHART NOTE - NSCHARTNOTEFT_GEN_A_CORE
76 year old left-handed female w/ PMHx chronic back pain (s/p multiple spinal surgeries, including cervical ACDF and lumbar laminectomy, on morphine 60mg QD), HTN, T2DM, migraine, hypothyroid, Raynaud's, who presents for confusion and difficulty ambulating. Patient accompanied by her daughter, who called EMS. Patient's LKN over 3 days prior to arrival. Patient's daughter stating that she had not seen patient in about a week. However, today, patient noted to be repeating sentences, not making sense. Patient told her daughter that she was having difficulty walking since last night. Patient is normally AOx3, ambulates on her own, and is cognitively intact.    Code stroke was called upon arrival to ED, CTH unremarkable, code stroke cancelled; electrolyte findings consistent with metabolic encephalopathy possibly 2/2 hyponatremia.      Pt with chronic back pain, on chronic opioid therapy, ?SCS    Pt states she has only been taking the MS Contin 60 mg at HS along with total daily dose of neurontin 600 mg at HS.  Topamax recently increased to TID from BID; however pt was taking entire dose of 300 mg Q AM.    EMR reviewed, pain controlled with current regimen. pain level 0/10.    Continue MS Contin 60 mg QHS  Continue Neurontin at 400 mg QHS - current CrCl is 47.8  Continue Topamax 100 mg BID for now; agree with Neurology consultation for headaches.  Lidoderm patch x 2 daily- one to low back and one to L scapula.    Monitor for sedation and respiratory depression.  Bowel regimen.  PT/ OOB per Medicine team.    Follow up with Dr Vinay Alex for continued pain management after discharge.  Discharge with a narcan rescue kit (naloxone 4 mg/ 0.1 ml nasal spray- 1 spray Q 2-3 minutes alternating between nostrils).    Signing off    Chronic Pain Service  [phone]
Confidential Drug Utilization Report  Search Terms: Miri Kenney, 1945Search Date: 08/29/2022 07:05:02 AM  Searching on behalf of: 05 - [facility]  The Drug Utilization Report below displays all of the controlled substance prescriptions, if any, that your patient has filled in the last twelve months. The information displayed on this report is compiled from pharmacy submissions to the Department, and accurately reflects the information as submitted by the pharmacies.    This report was requested by: Georgette Camacho | Reference #: [id number]    Others' Prescriptions  Patient Name: Miri KenneyBirth Date: 1945  Address: [address] [place], [place] [address]Bqh: Female  Rx Written	Rx Dispensed	Drug	Quantity	Days Supply	Prescriber Name	Prescriber Patricia #	Payment Method	Dispenser  08/12/2022	08/19/2022	morphine sulf er 60 mg tablet	30	30	StamatosVinay	[id number]	[facility]are	[facility] #[id number]  08/12/2022	08/19/2022	morphine sulf er 30 mg tablet	30	30	Stamatos, Vinay	[id number]	Medicare	[facility] #[id number]  07/15/2022	07/20/2022	morphine sulf er 60 mg tablet	30	30	Stamatos, Vinay	[id number]	[facility] #[id number]  07/15/2022	07/20/2022	morphine sulf er 30 mg tablet	30	30	Stamatos, Vinay	[id number]	Medicare	[facility] #[id number]  06/10/2022	06/22/2022	morphine sulf er 60 mg tablet	30	30	Stamatos, Vinay	[id number]	Medicare	[facility] #[id number]  06/10/2022	06/22/2022	morphine sulf er 30 mg tablet	30	30	Stamatos, Vinay	[id number]	[facility]icare	[facility] #[id number]  05/13/2022	05/24/2022	morphine sulf er 60 mg tablet	30	30	Stamatos, Viany	[id number]	[facility]icare	[facility] #[id number]  05/13/2022	05/24/2022	morphine sulf er 30 mg tablet	30	30	Stamatos, Vinay	[id number]	Medicare	[facility] #[id number]  04/15/2022	04/20/2022	morphine sulf er 60 mg tablet	30	30	Stamatos, Vinay [id number]	Medicare	[facility] #[id number]  04/15/2022	04/20/2022	morphine sulf er 30 mg tablet	30	30	Stamatos, Vinay [id number]	Medicare	[facility] #[id number]  03/14/2022	03/19/2022	morphine sulf er 60 mg tablet	30	30	Stamatos, Vinay	[id number]	[facility]icare	[facility] #[id number]  03/14/2022	03/19/2022	morphine sulf er 30 mg tablet	30	30	Stamatos, Vinay	[id number]	Medicare	[facility] #[id number]  02/14/2022	02/19/2022	morphine sulf er 30 mg tablet	30	30	Stamatos, Vinay	[id number]	Medicare	[facility] #[id number]  02/14/2022	02/19/2022	morphine sulf er 60 mg tablet	30	30	Stamatos, Vinay	[id number]	Medicare	[facility] #[id number]  01/14/2022	01/21/2022	morphine sulf er 30 mg tablet	30	30	Stamatos, Vinay [id number]	Medicare	[facility] #[id number]  01/14/2022	01/21/2022	morphine sulf er 60 mg tablet	30	30	Stamatos, Vinay	[id number]	Medicare	[facility] #[id number]  12/10/2021	12/22/2021	morphine sulf er 30 mg tablet	30	30	Stamatos, Vinay	[id number]	Medicare	[facility] #[id number]  12/10/2021	12/22/2021	morphine sulf er 60 mg tablet	30	30	Stamatos, Vinay [id number]	Medicare	[facility] #[id number]  11/11/2021	11/22/2021	morphine sulf er 60 mg tablet	30	30	Janny Louis	[id number]	Medicare	[facility] #[id number]  11/11/2021	11/22/2021	morphine sulf er 30 mg tablet	30	30	Janny Louis	[id number]	Medicare	[facility] #[id number]  10/13/2021	10/23/2021	morphine sulf er 60 mg tablet	30	30	Stamatos, Vinay	[id number]	Medicare	[facility] #[id number]  10/13/2021	10/23/2021	morphine sulf er 30 mg tablet	30	30	StamatosVinay	[id number]	Medicare	[facility] #[id number]  09/03/2021	09/23/2021	morphine sulf er 30 mg tablet	30	30	Stakalina, Vinay	[id number]	Medicare	[facility] #[id number]  09/03/2021	09/23/2021	morphine sulf er 60 mg tablet	30	30	Stamapaul, Vinay	[id number]	Medicare	[facility] #[id number]  * - Drugs marked with an asterisk are compound drugs. If the compound drug is made up of more than one controlled substance, then each controlled substance will be a separate row in the table.
The Drug Utilization Report below displays all of the controlled substance prescriptions, if any, that your patient has filled in the last twelve months. The information displayed on this report is compiled from pharmacy submissions to the Department, and accurately reflects the information as submitted by the pharmacies.    This report was requested by: Saleem Jenkins | Reference #: [id number]    Others' Prescriptions  Patient Name: Miri Ga Date: 1945  Address: [address] [place], [place] [address]Kri: Female  Rx Written	Rx Dispensed	Drug	Quantity	Days Supply	Prescriber Name	Prescriber Patricia #	Payment Method	Dispenser  08/12/2022	08/19/2022	morphine sulf er 60 mg tablet	30	30	StamatosVinay	[id number]	[facility]icare	[facility] #[id number]  08/12/2022	08/19/2022	morphine sulf er 30 mg tablet	30	30	Stamatos, Vinay	[id number]	Medicare	[facility] #[id number]  07/15/2022	07/20/2022	morphine sulf er 60 mg tablet	30	30	Stamatos, Vinay	[id number]	[facility] #[id number]  07/15/2022	07/20/2022	morphine sulf er 30 mg tablet	30	30	Stamatos, Vinay	[id number]	Medicare	[facility] #[id number]  06/10/2022	06/22/2022	morphine sulf er 60 mg tablet	30	30	Stamatos, Vinay	[id number]	Medicare	[facility] #[id number]  06/10/2022	06/22/2022	morphine sulf er 30 mg tablet	30	30	Stamatos, Vinay	[id number]	Medicare	[facility] #[id number]  05/13/2022	05/24/2022	morphine sulf er 60 mg tablet	30	30	Stamatos, Vinay	[id number]	Medicare	[facility] #[id number]  05/13/2022	05/24/2022	morphine sulf er 30 mg tablet	30	30	Stamatos, Vinay	[id number]	Medicare	[facility] #[id number]  04/15/2022	04/20/2022	morphine sulf er 60 mg tablet	30	30	Stamatos, Vinay	[id number]	Medicare	[facility] #[id number]  04/15/2022	04/20/2022	morphine sulf er 30 mg tablet	30	30	Stamatos, Vniay	[id number]	Medicare	[facility] #[id number]  03/14/2022	03/19/2022	morphine sulf er 60 mg tablet	30	30	Stamatos, Vinay [id number]	Medicare	[facility] #[id number]  03/14/2022	03/19/2022	morphine sulf er 30 mg tablet	30	30	Stamatos, Vinay	[id number]	Medicare	[facility] #[id number]  02/14/2022	02/19/2022	morphine sulf er 30 mg tablet	30	30	Stamatos, Vinay [id number]	Medicare	[facility] #[id number]  02/14/2022	02/19/2022	morphine sulf er 60 mg tablet	30	30	Stamatos, Vinay	[id number]	Medicare	[facility] #[id number]  01/14/2022	01/21/2022	morphine sulf er 30 mg tablet	30	30	Stamatos, Vinay [id number]	Medicare	[facility] #[id number]  01/14/2022	01/21/2022	morphine sulf er 60 mg tablet	30	30	Stamatos, Vinay [id number]	Medicare	[facility] #[id number]  12/10/2021	12/22/2021	morphine sulf er 30 mg tablet	30	30	Stamatos, Vinay [id number]	Medicare	[facility] #[id number]  12/10/2021	12/22/2021	morphine sulf er 60 mg tablet	30	30	[id number]	Medicare	[facility] #[id number]  11/11/2021	11/22/2021	morphine sulf er 60 mg tablet	30	30	Janny Louis	[id number]	Medicare	[facility] #[id number]  11/11/2021	11/22/2021	morphine sulf er 30 mg tablet	30	30	Janny Louis	[id number]	Medicare	[facility] #[id number]  10/13/2021	10/23/2021	morphine sulf er 60 mg tablet	30	30	Stamatos, Vinay [id number]	Medicare	[facility] #[id number]  10/13/2021	10/23/2021	morphine sulf er 30 mg tablet	30	30	Stamatos, Vinay [id number]	Medicare	[facility] #[id number]  09/03/2021	09/23/2021	morphine sulf er 30 mg tablet	30	30	Stamatos, Vinay [id number]	Medicare	[facility] #[id number]  09/03/2021	09/23/2021	morphine sulf er 60 mg tablet	30	30	Vinay Alex	[id number]	Medicare	[facility] #[id number]

## 2022-08-31 NOTE — PROGRESS NOTE ADULT - ATTENDING COMMENTS
76F  w/ PMHx chronic back pain , HTN, T2DM, migraine, hypothyroid, Raynaud's, p/w AMS  x few days    Seen and examined at bedside. She is back to her baseline and says her headache has improved. We discussed medication adjustment and management in detail with the pt and her daughter at bedside. We also discussed the importance of close follow up including Neurology referral and Dentistry for any oral complaints.    Migraine Headache  Increase Topamax to home dose on dc 100mg TID - pt was taking 300mg PO daily which increases the risk of sedation. Neurology agrees with dosing.  Sumatriptan PRN  F/u Neurology regarding continued management    AMS likely due to polypharmacy, resolved  Resume home dose of Morphine, dc Flexeril  Chronic pain service recommendations appreciated    Hypovolemic Hyponatremia, resolved  discontinue IVF  Sodium is now 135  Disposition: home PT  45 minutes spent on dc planning  d/w pt and team in detail

## 2022-08-31 NOTE — PROGRESS NOTE ADULT - PROBLEM SELECTOR PLAN 8
- c/w home amlodipine 10 qd  - will restart ARB, holding patient's home HCTZ - c/w home amlodipine 10 qd  - will restart ARB, pending improved kidney function would restart home HCTZ

## 2022-08-31 NOTE — DISCHARGE NOTE PROVIDER - CARE PROVIDERS DIRECT ADDRESSES
,[email or web address] ,[email or web address],DirectAddress_Unknown ,[email or web address],DirectAddress_Unknown,DirectAddress_Unknown

## 2022-08-31 NOTE — DISCHARGE NOTE PROVIDER - CARE PROVIDER_API CALL
Haresh Mendenhall)  Neurology  [address], [address]  [place] [address]  Phone: [phone]  Fax: [phone]  Follow Up Time: 2 weeks   Haresh Mendenhall)  Neurology  [address], [address]  [place] [address]  Phone: [phone]  Fax: [phone]  Follow Up Time: 2 weeks    Vinay Alex)  Anesthesiology; Pain Medicine  [address] [place] [address]  Phone: [phone]  Fax: [phone]  Follow Up Time: 2 weeks   Haresh Mendenhall)  Neurology  [address], [address]  [place] [address]  Phone: [phone]  Fax: [phone]  Follow Up Time: 2 weeks    Vinay Alex)  Anesthesiology; Pain Medicine  [address], [address] [place] [address]  Phone: [phone]  Fax: [phone]  Follow Up Time: 2 weeks    ELE RUIZ  Internal Medicine  [address]  [place] [address]  Phone: [phone]  Fax: [phone]  Follow Up Time: 1 week

## 2022-08-31 NOTE — PROGRESS NOTE ADULT - PROBLEM SELECTOR PLAN 4
- RENNY with BUN 60/1.399  - possibly i/s/o hypovolemia  - f/u repeat Cr s/p IVF hydration  RESOLVED

## 2022-08-31 NOTE — PROGRESS NOTE ADULT - PROBLEM SELECTOR PLAN 1
- metabolic encephalopathy possibly 2/2 metabolic vs. infectious vs. drug induced; polypharmacy likely a factor  - most likely 2/2 hyponatremia vs. opoid induced hyponatremia vs. marijuana induced  - CTH unremarkable, Ammonia wnl, alcohol level wnl, +acidotic, hypoNatremic/hypochloremic, neg covid, clear ua  - negative code stroke per neurology, no sz, +repetitive wording, but mental status improving  - U negative for illicits, positive for opiates with known Rx  - will hold home topiramate, muscle relaxants  - c/s Chronic pain, appreciate recs for medication titration, as patient was incorrectly dosing several Beers Criteria medications at home as expected  - c/s Neurology, appreciate recs and will f/u as outpatient for further management of migraines - metabolic encephalopathy possibly 2/2 polypharmacy precipitating hypovolemic hyponatremia  - CTH unremarkable, Ammonia wnl, alcohol level wnl, +acidotic, hypoNatremic/hypochloremic, neg covid, clear ua  - negative code stroke per neurology, no sz, +repetitive wording, but mental status improving  - U negative for illicits, positive for opiates with known Rx  - Chronic pain on board, appreciate recs for medication titration, as patient was incorrectly dosing several Beers Criteria medications at home as expected  - Neurology on board, appreciate recs and will f/u as outpatient for further management of migraines  RESOLVED

## 2022-08-31 NOTE — DISCHARGE NOTE PROVIDER - NSDCMRMEDTOKEN_GEN_ALL_CORE_FT
amLODIPine 10 mg oral tablet: 1 tab(s) orally once a day  cyclobenzaprine 5 mg oral tablet: 1 tab(s) orally 3 times a day  folic acid 1 mg oral tablet: 1 tab(s) orally once a day  gabapentin 300 mg oral tablet: 1 tab(s) orally every 8 hours  levothyroxine 88 mcg (0.088 mg) oral tablet: 1 tab(s) orally once a day  losartan-hydrochlorothiazide 100 mg-12.5 mg oral tablet: 1 tab(s) orally once a day  morphine 30 mg/24 hours oral capsule, extended release: 1 cap(s) orally once a day  morphine 60 mg/24 hours oral capsule, extended release: 1 cap(s) orally once a day (at bedtime)  Skelaxin 800 mg oral tablet: 1 tab(s) orally 3 times a day, As Needed  Topamax 100 mg oral tablet: 3 tab(s) orally once a day  Vitamin D3 2000 intl units oral tablet: 1 tab(s) orally once a day   amLODIPine 10 mg oral tablet: 1 tab(s) orally once a day  Dulcolax Laxative 5 mg oral delayed release tablet: 1 tab(s) orally once a day  folic acid 1 mg oral tablet: 1 tab(s) orally once a day  levothyroxine 88 mcg (0.088 mg) oral tablet: 1 tab(s) orally once a day  losartan-hydrochlorothiazide 100 mg-12.5 mg oral tablet: 1 tab(s) orally once a day  morphine 30 mg/24 hours oral capsule, extended release: 1 cap(s) orally once a day  morphine 60 mg/24 hours oral capsule, extended release: 1 cap(s) orally once a day (at bedtime)  Neurontin 400 mg oral capsule: 1 tab(s) orally once a day (at bedtime)  Senna 8.6 mg oral tablet: 2 tab(s) orally once a day  SUMAtriptan 25 mg oral tablet: 1 tab(s) orally once  Topamax 100 mg oral tablet: 2 tab(s) orally 2 times a day  Vitamin D3 2000 intl units oral tablet: 1 tab(s) orally once a day

## 2022-09-02 ENCOUNTER — NON-APPOINTMENT (OUTPATIENT)
Age: 77
End: 2022-09-02

## 2022-09-02 LAB — DRUG SCREEN, SERUM: SIGNIFICANT CHANGE UP

## 2022-10-10 ENCOUNTER — OUTPATIENT (OUTPATIENT)
Dept: OUTPATIENT SERVICES | Facility: HOSPITAL | Age: 77
LOS: 1 days | End: 2022-10-10
Payer: MEDICARE

## 2022-10-10 ENCOUNTER — APPOINTMENT (OUTPATIENT)
Dept: CT IMAGING | Facility: CLINIC | Age: 77
End: 2022-10-10

## 2022-10-10 DIAGNOSIS — Z98.89 OTHER SPECIFIED POSTPROCEDURAL STATES: Chronic | ICD-10-CM

## 2022-10-10 DIAGNOSIS — Z98.890 OTHER SPECIFIED POSTPROCEDURAL STATES: Chronic | ICD-10-CM

## 2022-10-10 DIAGNOSIS — Z98.49 CATARACT EXTRACTION STATUS, UNSPECIFIED EYE: Chronic | ICD-10-CM

## 2022-10-10 DIAGNOSIS — Z98.1 ARTHRODESIS STATUS: Chronic | ICD-10-CM

## 2022-10-10 DIAGNOSIS — Z96.89 PRESENCE OF OTHER SPECIFIED FUNCTIONAL IMPLANTS: Chronic | ICD-10-CM

## 2022-10-10 DIAGNOSIS — M54.9 DORSALGIA, UNSPECIFIED: Chronic | ICD-10-CM

## 2022-10-10 DIAGNOSIS — R91.8 OTHER NONSPECIFIC ABNORMAL FINDING OF LUNG FIELD: ICD-10-CM

## 2022-10-10 PROCEDURE — 71250 CT THORAX DX C-: CPT | Mod: 26,MH

## 2022-10-10 PROCEDURE — 71250 CT THORAX DX C-: CPT

## 2022-10-11 NOTE — ED ADULT NURSE NOTE - NS ED NURSE DISCH DISPOSITION
October 12, 2022      To Whom It May Concern:      Latia Bauer was in the ER today with her daughter.  Please excuse her from work for the next 4 days to help take care of her daughter at home.    Sincerely,        Yoana Ibrahim MD        
Admitted

## 2022-10-18 ENCOUNTER — NON-APPOINTMENT (OUTPATIENT)
Age: 77
End: 2022-10-18

## 2022-10-27 ENCOUNTER — NON-APPOINTMENT (OUTPATIENT)
Age: 77
End: 2022-10-27

## 2022-10-31 ENCOUNTER — APPOINTMENT (OUTPATIENT)
Dept: ORTHOPEDIC SURGERY | Facility: CLINIC | Age: 77
End: 2022-10-31

## 2022-10-31 PROCEDURE — 99214 OFFICE O/P EST MOD 30 MIN: CPT | Mod: 25

## 2022-10-31 PROCEDURE — 20611 DRAIN/INJ JOINT/BURSA W/US: CPT | Mod: RT

## 2022-10-31 PROCEDURE — 73564 X-RAY EXAM KNEE 4 OR MORE: CPT | Mod: 50

## 2022-11-21 ENCOUNTER — APPOINTMENT (OUTPATIENT)
Dept: ORTHOPEDIC SURGERY | Facility: CLINIC | Age: 77
End: 2022-11-21

## 2022-11-21 PROCEDURE — 99214 OFFICE O/P EST MOD 30 MIN: CPT | Mod: 25

## 2022-11-21 PROCEDURE — 20611 DRAIN/INJ JOINT/BURSA W/US: CPT | Mod: LT

## 2022-11-21 NOTE — HISTORY OF PRESENT ILLNESS
[de-identified] : 76 year old female presents complaining of resolved right knee pain as of 2-3 days ago. Left knee pain mostly unchanged. She did start doing PT for her back again with thigh strengthening. Pain is localized on the lateral aspect of the right knee. She noted no swelling or buckling. She was last seen in 10/31/2022 and received a right knee Monovisc injection. Pt presents to receive left knee Monovisc injection (11/21/22).\par She also complains about right posterior hip pain. She has hx of chronic low back pain, neuropathy and scar tissue after 8 spinal surgeries with Dr. Blancas, most recent 2017. She sees pain management (Dr. Alex) and takes Skelaxin, cyclobenzaprine and morphine for chronic pain. She uses uses 5% Lidoderm patches on her back. She has a spinal cord stimulator and cannot have an MRI. She is off of Gabapentin (was taking 2200mg but then started getting very lethargic). PMHx of DM controlled by diet, A1c 5.8%.\par \par CT Scan of the Right Hip obtained on 10/21/2021 revealed:\par 1.  Moderate posterior right hip arthrosis.\par 2.  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  Moderate right fat-containing inguinal hernia.\par 4.  Mild tendinosis of the gluteus medius insertion with small calcifications. Mild edema in the right greater trochanteric bursa.\par \par Radiology Results taken on 5/5/2021:\par o Lumbosacral Spine : AP and lateral views were obtained and revealed a fusion of the entire lumbosacral spine from T11 to the sacrum with a spinal cord stimulator.\par o Pelvis: AP pelvis was obtained and revealed mild to moderate arthritis of both hips.\par \par Radiology Results taken on 10/31/22: \par o Right Knee : Standing AP, lateral, tunnel, and skyline views of the knee were obtained and reveal moderate medial and patellofemoral arthritis.\par o Left Knee : Standing AP, lateral, tunnel, and skyline views of the knee were obtained and reveal moderate medial and patellofemoral arthritis.

## 2022-11-21 NOTE — ADDENDUM
[FreeTextEntry1] : I, Lulu Johnson, acted solely as a scribe for Dr. Heriberto Buenrostro on this date 11/21/2022 \par All medical record entries made by the Scribe were at my, Dr. Heriberto Buenrostro, direction and personally dictated by me on 11/21/2022 . I have reviewed the chart and agree that the record accurately reflects my personal performance of the history, physical exam, assessment and plan. I have also personally directed, reviewed, and agreed with the chart.

## 2022-11-21 NOTE — PHYSICAL EXAM
[de-identified] : Constitutional\par o Appearance : well-nourished, well developed, alert, in no acute distress \par Head and Face\par o Head :\par ¦ Inspection : atraumatic, normocephalic\par o Face :\par ¦ Inspection : no visible rash or discoloration\par Respiratory\par o Respiratory Effort: breathing unlabored \par Neurologic\par o Mental Status Examination :\par ¦ Orientation : alert and oriented X 3\par Psychiatric\par o Mood and Affect: mood normal, affect appropriate\par Cardiovascular\par o Observation/Palpation : - no swelling\par Lymphatic\par o Additional Nodes : No palpable lymph nodes present\par \par Lumbosacral Spine\par o Inspection : no visible rash or discoloration\par o Palpation : no paraspinal musculature tenderness\par o Range of Motion : limited motion of the lumbosacral spine spine, extension causes no discomfort, sidebending to the right causes parascapular discomfort on the left, rotation causes minimal discomfort\par o Muscle Strength : paraspinal muscle strength and tone within normal limits\par o Muscle Tone : paraspinal muscle strength and tone within normal limits\par o Tests: straight leg test negative and SARAI test negative bilaterally \par  \par Right Lower Extremity\par o Buttock : no tenderness, swelling or deformities\par o Right Hip :\par ¦ Inspection/Palpation : posterior tenderness over the trochanter, no swelling or deformities\par ¦ Range of Motion : full flexion and rotation,  full external rotation causes discomfort, no pain with rotation in extension, no crepitance\par ¦ Stability : joint stability intact\par ¦ Strength : extension, flexion 5/5, adduction, abduction 4-/5, internal rotation and external rotation\par \par o Right Knee :\par ¦ Inspection/Palpation : mild lateral compartment tenderness to palpation, no swelling\par ¦ Range of Motion : 5-125°, no crepitance, good patellofemoral glide \par ¦ Stability : no valgus or varus instability present on provocative testing\par ¦ Strength : flexion and extension 5/5\par ¦ Tests and Signs : all tests for stability normal\par o Reflexes : patellar tendon reflex 2+, ankle reflex 2+, Babinski sign absent (toes downgoing) \par \par Left Lower Extremity\par o Buttock : no tenderness, swelling or deformities \par o Left Hip :\par ¦ Inspection/Palpation : greater trochanteric tenderness, no swelling or deformities\par ¦ Range of Motion : full flexion, painless rotation, no crepitance\par ¦ Stability : joint stability intact\par ¦ Strength : extension, flexion 5/5, adduction, abduction 4-/5, internal rotation and external rotation\par \par o Left Knee :\par ¦ Inspection/Palpation : medial compartment tenderness to palpation, no swelling\par ¦ Range of Motion : FROM, no crepitance, good patellofemoral glide \par ¦ Stability : no valgus or varus instability present on provocative testing\par ¦ Strength : flexion and extension 5/5\par ¦ Tests and Signs : all tests for stability normal\par o Reflexes : patellar tendon reflex 2+, ankle reflex 2+, Babinski sign absent (toes downgoing) \par  \par Gait: kyphotic, no significant extremity swelling or lymphedema, equal leg lengths, no foot drop, reasonable core strength\par \par \par o Left Knee injection : Indication- knee osteoarthritis, Anatomic location- left intra-articular joint space, Spray - area was sterilized with Betadine and alcohol and anesthetized with Ethyl Chloride, needle used-20G, Medications given- 4cc's Monovisc under Ultrasound guidance, and patient tolerated it well.  Amandaot: Monovisc: [id number]   oExp: 2025-06-30

## 2022-11-21 NOTE — DISCUSSION/SUMMARY
[de-identified] : I discussed the underlying pathophysiology of the patient's condition in great detail with the patient. I went over the patient's x-rays with them in great detail. Pr The patient elected to receive a Monovisc injection into her left knee today and tolerated it well. I instructed the patient on ROM exercises and told them to take it easy. The use of ice and rest was reviewed with the patient. The patient may resume activities tomorrow. I reminded the patient that it takes 4 to 6 weeks after the injection to feel symptom relief. I am recommending the patient continues going to physical therapy to obtain increases in her strength and mobility. A prescription was provided. All of their questions were answered. They understand and consent to the plan.\par \par FU in 6 weeks.\par after a left knee Monovisc injection today (11/21/2022).

## 2022-12-12 VITALS — BODY MASS INDEX: 21.71 KG/M2 | WEIGHT: 118 LBS | HEIGHT: 62 IN

## 2023-01-01 NOTE — H&P PST ADULT - PRO INTERPRETER NEED 2
Good to see you today!    William looks great on exam today. It seems as if he is tolerating this upper respiratory infection well.   Continue symptomatic care. Call back or return to office if fever develops, symptoms worsen, difficulty breathing, shortness of breath, or new symptoms.    Great growth.      Continue good health habits - These are of primary importance for your child's optimal good health, growth, and development:   Good Nutrition - continue to breast feed on demand.     Floor time/play each day   Continue to foster Good Sleeping habits with routines at naps and night time.       Vaccines deferred today due to current respiratory illness. VIS sheets were offered and counseling on immunization(s) and side effects was given   Mother will schedule for 1-2 weeks and he will receive Pediarix, hib, vaxneuvance, and rotateq      English

## 2023-01-04 ENCOUNTER — APPOINTMENT (OUTPATIENT)
Dept: ORTHOPEDIC SURGERY | Facility: CLINIC | Age: 78
End: 2023-01-04
Payer: MEDICARE

## 2023-01-04 DIAGNOSIS — M76.892 OTHER SPECIFIED ENTHESOPATHIES OF LEFT LOWER LIMB, EXCLUDING FOOT: ICD-10-CM

## 2023-01-04 PROCEDURE — 99214 OFFICE O/P EST MOD 30 MIN: CPT

## 2023-01-05 ENCOUNTER — NON-APPOINTMENT (OUTPATIENT)
Age: 78
End: 2023-01-05

## 2023-01-05 DIAGNOSIS — Z87.39 PERSONAL HISTORY OF OTHER DISEASES OF THE MUSCULOSKELETAL SYSTEM AND CONNECTIVE TISSUE: ICD-10-CM

## 2023-01-05 DIAGNOSIS — R52 PAIN, UNSPECIFIED: ICD-10-CM

## 2023-01-05 DIAGNOSIS — Z79.891 LONG TERM (CURRENT) USE OF OPIATE ANALGESIC: ICD-10-CM

## 2023-01-05 DIAGNOSIS — M79.18 MYALGIA, OTHER SITE: ICD-10-CM

## 2023-01-05 DIAGNOSIS — F11.20 OPIOID DEPENDENCE, UNCOMPLICATED: ICD-10-CM

## 2023-01-05 DIAGNOSIS — M43.27 FUSION OF SPINE, LUMBOSACRAL REGION: ICD-10-CM

## 2023-01-05 DIAGNOSIS — M46.1 SACROILIITIS, NOT ELSEWHERE CLASSIFIED: ICD-10-CM

## 2023-01-05 DIAGNOSIS — G89.4 CHRONIC PAIN SYNDROME: ICD-10-CM

## 2023-01-05 RX ORDER — LOSARTAN POTASSIUM 100 MG/1
100 TABLET, FILM COATED ORAL DAILY
Refills: 0 | Status: ACTIVE | COMMUNITY
Start: 2017-02-01

## 2023-01-05 RX ORDER — BUTALBITAL, ACETAMINOPHEN AND CAFFEINE 300; 50; 40 MG/1; MG/1; MG/1
50-300-40 CAPSULE ORAL TWICE DAILY
Refills: 0 | Status: ACTIVE | COMMUNITY

## 2023-01-05 RX ORDER — CHLORHEXIDINE GLUCONATE, 0.12% ORAL RINSE 1.2 MG/ML
0.12 SOLUTION DENTAL
Refills: 0 | Status: ACTIVE | COMMUNITY
Start: 2018-02-21

## 2023-01-05 RX ORDER — PREDNISONE 5 MG/1
5 TABLET ORAL
Refills: 0 | Status: ACTIVE | COMMUNITY

## 2023-01-18 ENCOUNTER — APPOINTMENT (OUTPATIENT)
Dept: PAIN MANAGEMENT | Facility: CLINIC | Age: 78
End: 2023-01-18
Payer: MEDICARE

## 2023-01-18 VITALS — WEIGHT: 109 LBS | BODY MASS INDEX: 20.06 KG/M2 | HEIGHT: 62 IN

## 2023-01-18 PROCEDURE — 99213 OFFICE O/P EST LOW 20 MIN: CPT | Mod: 95

## 2023-01-18 NOTE — HISTORY OF PRESENT ILLNESS
[Lower back] : lower back [Left Leg] : left leg [7] : 7 [Dull/Aching] : dull/aching [Sharp] : sharp [Stabbing] : stabbing [Constant] : constant [Meds] : meds [Walking/activity] : walking/activity [Lying in bed] : lying in bed [Disabled] : Work status: disabled [Home] : at home, [unfilled] , at the time of the visit. [Medical Office: ([facility] [place])___] : at the medical office located in  [Verbal consent obtained from patient] : the patient, [unfilled] [] : no [FreeTextEntry7] : LT LEG  [de-identified] : 2022

## 2023-01-18 NOTE — ASSESSMENT
[FreeTextEntry1] : Interim history\par The patient is tolerating their medications without problems. There has no new pains, injuries, or complaints and no new issues. The use of medications appears appropriate and there are no aberrant behaviors noted. Side effects to current medications are denied. Average pain score for the month is 7 out of ten. The patient's current medications are documented to the best of their ability. THe  was obtained and reviewed prior to the visit, and any discrepancies were discussed with the patient.\par Objective information\par Since the last visit there are no additional radiologic studies, labs, or pain complaints. There are no changes in the patient's physical status.\par Plan\par The patient was given refill of their medication at their current level and will return to the office as needed for follow-up. The patient is showing no aberrant behavior or evidence of diversion. Opioid contract and opioid risk assessment on chart.  reviewed and any discrepancy discussed with patent. Applicable urine toxicology reviewed and recorded in the patient's electronic record. Urine toxicology is ordered for patient per office protocol or patient's risk assessment.  Patient will follow-up in 1 month unless new issues arise the patient has returned earlier.\par

## 2023-02-14 ENCOUNTER — APPOINTMENT (OUTPATIENT)
Dept: PULMONOLOGY | Facility: CLINIC | Age: 78
End: 2023-02-14
Payer: MEDICARE

## 2023-02-14 ENCOUNTER — NON-APPOINTMENT (OUTPATIENT)
Age: 78
End: 2023-02-14

## 2023-02-14 VITALS
SYSTOLIC BLOOD PRESSURE: 110 MMHG | WEIGHT: 114 LBS | HEIGHT: 61 IN | DIASTOLIC BLOOD PRESSURE: 65 MMHG | RESPIRATION RATE: 16 BRPM | TEMPERATURE: 97.8 F | BODY MASS INDEX: 21.52 KG/M2

## 2023-02-14 PROCEDURE — 99214 OFFICE O/P EST MOD 30 MIN: CPT | Mod: 25

## 2023-02-14 PROCEDURE — 94010 BREATHING CAPACITY TEST: CPT

## 2023-02-14 PROCEDURE — 95012 NITRIC OXIDE EXP GAS DETER: CPT

## 2023-02-14 NOTE — ADDENDUM
[FreeTextEntry1] : Documented by SARAHI Pastor acting as a scribe for Dr. Chance Robertson on 02/14/2023 .\par \par All medical record entries made by the Scribe were at my, Dr. Chance Robertson's, direction and personally dictated by me on 02/14/2023. I have reviewed the chart and agree that the record accurately reflects my personal performance of the history, physical exam, assessment and plan. I have also personally directed, reviewed, and agree with the discharge instructions. \par \par \par \par

## 2023-02-14 NOTE — PHYSICAL EXAM
[No Acute Distress] : no acute distress [Normal Oropharynx] : normal oropharynx [II] : Mallampati Class: II [Normal Appearance] : normal appearance [No Neck Mass] : no neck mass [Normal Rate/Rhythm] : normal rate/rhythm [Normal S1, S2] : normal s1, s2 [No Murmurs] : no murmurs [No Resp Distress] : no resp distress [Clear to Auscultation Bilaterally] : clear to auscultation bilaterally [No Abnormalities] : no abnormalities [Kyphosis] : kyphosis [Benign] : benign [Normal Gait] : normal gait [No Clubbing] : no clubbing [No Cyanosis] : no cyanosis [No Edema] : no edema [FROM] : FROM [Normal Color/ Pigmentation] : normal color/ pigmentation [No Focal Deficits] : no focal deficits [Oriented x3] : oriented x3 [Normal Affect] : normal affect [TextBox_68] : I:E ratio 1:3; clear

## 2023-02-14 NOTE — ASSESSMENT
[FreeTextEntry1] : Ms. Kenney is a 77 year old female who has a history of DM, HLD, HTN, neurontin, hypothyroid, IBS, OA, asthma, allergy, and abnormal CT scan, bronchiectasis, GERD, and orthopedic issues. She is currently stable from a pulmonary perspective - except back pain (still)\par \par Problem 1: Bronchiectasis\par - Recommended Acapella device.\par Seen on the CT of the chest or chest x-ray signifies damaged bronchial tubes focal or diffuse which can be sites of recurrent infections. These areas can be colonized by various organisms including bacteria (hemophilous influenzae/Pseudomonas species etc.) as well as acid fast bacilli (mycobacterial disease- inclusive of TB/MACI etc.). Sputum either for bacteria culture/sensitivity or AFB culture and sensitivity will need to be sent if the patient has sputum- 3 specimens on consecutive days will need to be dropped at the laboratory- if the patient can produce sputum.\par \par problem 2: asthma (quiet)\par -continue to use Breo Ellipta 200 at 1 inhalation QD (Noncompliant- needs to improve)\par -continue Ventolin as a rescue inhaler 2 inhalations pre-exercise \par -Asthma is  believed to be caused by inherited (genetic) and environmental factor, but its exact cause is unknown. Asthma may be triggered by allergens, lung infections, or irritants in the air. Asthma triggers are different for each person\par -Inhaler technique reviewed as well as oral hygiene techniques reviewed with patient. Avoidance of cold air, extremes of temperature, rescue inhaler should be used before exercise. Order of medication reviewed with patient. Recommended use of a cool mist humidifier in the bedroom.\par \par problem 3: allergic rhinitis (quiet)\par -continue to use Flonase 1 sniff/nostril BID\par -continue to use Astelin 0.15 1 sniff/nostril BID\par -continue to use Clarinex 5 mg QHS \par - Planning on beginning Allegra OTC QAM\par - Recommended the Navage \par -Environmental measures for allergies were encouraged including mattress and pillow cover, air purifier, and \par \par problem 3: abnormal chest CT, ? early cancer (improved- ?inflammation)- improved\par -s/p chest CT in 10/2022; next 10/2023\par -Differential diagnosis include: (minimally invasive) cancer, BOOP, or hypersensitivity pneumonitis\par -recommended thoracic surgical evaluation to determine treatment environmental controls.\par -CAT scans are the only radiological modality to identify abnormalities within the lungs with regards to nodules/masses/lymph nodes. Risks, benefits were reviewed in detail. The guidelines for abnormalities include follow up CT scans at various intervals which could range from 6 weeks to 1 year intervals. If there is a change for the worse then consideration for a biopsy will be considered if the patient is a candidate. Second opinion evaluation with a thoracic surgeon or an interventional radiologist could be offered. \par \par Problem 3A: ?TBM / ?Bronchomalacia/Laryngomalacia (NOT)\par -s/p Dynamic CT 4/2022\par -Tracheomalacia is usually acquired in adults and common causes include damage by tracheostomy or endotracheal intubation damaging the tracheal cartilage with increase risk with multiple intubations, prolonged intubation, and concurrent high dose steroid therapy; external chest wall trauma and surgery; chronic compression of the trachea by benign etiologies (eg, benign mediastinal goiter) or malignancy; relapsing polychondritis; or recurrent infection. Tracheomalacia can be asymptomatic, however signs or symptoms can develop as the severity of the airway narrowing progresses with major symptoms include dyspnea, cough, and sputum retention. Other symptoms include severe paroxysms of coughing, wheezing or stridor, barking cough and may be exacerbated by forced expiration, cough, and valsalva maneuver. Tracheomalacia is diagnosed by a bronchoscopic visualization of dynamic airway collapse on dynamic chest CT. Therapy is warranted in symptomatic patients with severe tracheomalacia and includes surgical repair as tracheobronchoplasty. The patient was referred to Dr. Rene Le or Dr. Cricket Serna, at [facility] for a surgical consult. \par \par problem 4: GERD\par -Add Pepcid 40 mg QHS \par -Rule of 2s: avoid eating too much, eating too late, eating too spicy, eating two hours before bed\par -Things to avoid including overeating, spicy foods, tight clothing, eating within three hours of bed, this list is not all inclusive. \par -For treatment of reflux, possible options discussed including diet control, H2 blockers, PPIs, as well as coating motility agents discussed as treatment options. Timing of meals and proximity of last meal to sleep were discussed. If symptoms persist, a formal gastrointestinal evaluation is needed.\par \par problem 5: sicca\par -continue to use Evoxac 30 mg TID\par \par problem 6: Health Maintenance/COVID19 Precautions:\par -s/p Covid-19 vaccine x3\par \par - Clean your hands often. Wash your hands often with soap and water for at least 20 seconds, especially after blowing your nose, coughing, or sneezing, or having been in a public place.\par - If soap and water are not available, use a hand  that contains at least 60% alcohol.\par - To the extent possible, avoid touching high-touch surfaces in public places - elevator buttons, door handles, handrails, handshaking with people, etc. Use a tissue or your sleeve to cover your hand or finger if you must touch something.\par - Wash your hands after touching surfaces in public places.\par - Avoid touching your face, nose, eyes, etc.\par - Clean and disinfect your home to remove germs: practice routine cleaning of frequently touched surfaces (for example: tables, doorknobs, light switches, handles, desks, toilets, faucets, sinks & cell phones)\par - Avoid crowds, especially in poorly ventilated spaces. Your risk of exposure to respiratory viruses like COVID-19 may increase in crowded, closed-in settings with little air circulation if there are people in the crowd who are sick. All patients are recommended to practice social distancing and stay at least 6 feet away from others.\par - Avoid all non-essential travel including plane trips, and especially avoid embarking on cruise ships.\par -If COVID-19 is spreading in your community, take extra measures to put distance between yourself and other people to further reduce your risk of being exposed to this new virus.\par -Stay home as much as possible.\par - Consider ways of getting food brought to your house through family, social, or commercial networks\par -Be aware that the virus has been known to live in the air up to 3 hours post exposure, cardboard up to 24 hours post exposure, copper up to 4 hours post exposure, steel and plastic up to 2-3 days post exposure. Risk of transmission from these surfaces are affected by many variables.\par Immune Support Recommendations:\par -OTC Vitamin C 500mg BID \par -OTC Quercetin 250-500mg BID \par -OTC Zinc 75-100mg per day \par -OTC Melatonin 1 or 2 mg a night \par -OTC Vitamin D 1-4000mg per day \par -OTC Tonic Water 8oz per day\par Asthma and COVID19:\par You need to make sure your asthma is under control. This often requires the use of inhaled corticosteroids (and sometimes oral corticosteroids). Inhaled corticosteroids do not likely reduce your immune system’s ability to fight infections, but oral corticosteroids may. It is important to use the steps above to protect yourself to limit your exposure to any respiratory virus. \par \par problem 7: health maintenance \par -recommended evaluation with Dr. Trejo for back issue\par -Recommended Christian Matt "Foundation Stretching" \par -recommended yearly flu shot after October 15 s/p 2022\par -recommended strep pneumonia vaccines: Prevnar-13 vaccine, followed by Pneumo vaccine 23 one year following (completed) \par -recommended early intervention for URIs\par -recommended regular osteoporosis evaluations\par -recommended early dermatological evaluations\par -recommended after the age of 50 to the age of 70, colonoscopy every 5 years \par \par F/U in 4 months\par She is encouraged to call with any changes, concerns, or questions

## 2023-02-14 NOTE — HISTORY OF PRESENT ILLNESS
[FreeTextEntry1] : Ms. Kenney is a 77 year old female presenting to the office for a follow up visit for abnormal chest CT, reflux, allergic rhinitis, asthma, bronchiectasis, chronic rhinitis, lung nodules and shortness of breath. Her chief complaint is \par \par -s/p hospitalization ([facility])  for 4 days during the end of Summer due to hyponatremia\par -she notes bowels are regular \par -she notes frequent conjunctivitis\par -she notes L leg issues\par -she notes her breathing is stable \par \par -she denies any headaches, nausea, emesis, fever, chills, sweats, chest pain, chest pressure, coughing, wheezing, palpitations, constipation, diarrhea, vertigo, dysphagia, heartburn, reflux, itchy ears, leg swelling, myalgias, or sour taste in the mouth.

## 2023-02-14 NOTE — PROCEDURE
[FreeTextEntry1] : Chest CT (10.10.2022) revealed Near total resolution of groundglass and nodularity in the posterior right upper lobe, with similar appearing region left lower lobe and new peribronchiolar groundglass densities in the right lower lobe. Findings suggestive of chronic/recurrent infectious or inflammatory process\par \par PFT reveals normal flows, with an FEV1 of  2.17L, which is 121% of predicted, with a normal flow volume loop.\par \par FENO was 24; a normal value being less than 25\par Fractional exhaled nitric oxide (FENO) is regarded as a simple, noninvasive method for assessing eosinophilic airway inflammation. Produced by a variety of cells within the lung, nitric oxide (NO) concentrations are generally low in healthy individuals. However, high concentrations of NO appear to be involved in nonspecific host defense mechanisms and chronic inflammatory diseases such as asthma. The American Thoracic Society (ATS) therefore has recommended using FENO to aid in the diagnosis and monitoring of eosinophilic airway inflammation and asthma, and for identifying steroid responsive individuals whose chronic respiratory symptoms may be caused by airway inflammation.

## 2023-02-15 ENCOUNTER — APPOINTMENT (OUTPATIENT)
Dept: PAIN MANAGEMENT | Facility: CLINIC | Age: 78
End: 2023-02-15
Payer: MEDICARE

## 2023-02-15 VITALS — WEIGHT: 114 LBS | HEIGHT: 61 IN | BODY MASS INDEX: 21.52 KG/M2

## 2023-02-15 PROCEDURE — 99213 OFFICE O/P EST LOW 20 MIN: CPT | Mod: 95

## 2023-02-15 RX ORDER — OXYCODONE 5 MG/1
5 TABLET ORAL
Qty: 12 | Refills: 0 | Status: DISCONTINUED | COMMUNITY
Start: 2021-03-08 | End: 2023-02-15

## 2023-02-15 NOTE — HISTORY OF PRESENT ILLNESS
[Lower back] : lower back [8] : 8 [Burning] : burning [Stabbing] : stabbing [Constant] : constant [Household chores] : household chores [Sitting] : sitting [Home] : at home, [unfilled] , at the time of the visit. [Medical Office: ([facility])___] : at the medical office located in  [Verbal consent obtained from patient] : the patient, [unfilled] [] : Post Surgical Visit: no [FreeTextEntry7] : LT LEG

## 2023-02-16 ENCOUNTER — APPOINTMENT (OUTPATIENT)
Dept: ORTHOPEDIC SURGERY | Facility: CLINIC | Age: 78
End: 2023-02-16
Payer: MEDICARE

## 2023-02-16 PROCEDURE — 73502 X-RAY EXAM HIP UNI 2-3 VIEWS: CPT | Mod: LT

## 2023-02-16 PROCEDURE — 99214 OFFICE O/P EST MOD 30 MIN: CPT | Mod: 25

## 2023-02-16 PROCEDURE — 20610 DRAIN/INJ JOINT/BURSA W/O US: CPT | Mod: LT

## 2023-03-08 NOTE — ASU PATIENT PROFILE, ADULT - CAREGIVER
What Is The Reason For Today's Visit?: Full Body Skin Examination What Is The Reason For Today's Visit? (Being Monitored For X): concerning skin lesions on a periodic basis No

## 2023-03-20 ENCOUNTER — APPOINTMENT (OUTPATIENT)
Dept: PAIN MANAGEMENT | Facility: CLINIC | Age: 78
End: 2023-03-20
Payer: MEDICARE

## 2023-03-20 PROCEDURE — 99213 OFFICE O/P EST LOW 20 MIN: CPT | Mod: 95

## 2023-03-20 NOTE — HISTORY OF PRESENT ILLNESS
[Lower back] : lower back [8] : 8 [Burning] : burning [Stabbing] : stabbing [Constant] : constant [Household chores] : household chores [Sitting] : sitting [Home] : at home, [unfilled] , at the time of the visit. [Medical Office: ([facility])___] : at the medical office located in  [Verbal consent obtained from patient] : the patient, [unfilled] [de-identified] : 03/20/2023- PATIENT STATES COMPLETED PHYSICAL THERAPY ON AND OFF THE PAST YEAR IN  3X A WEEK  AND REPORT'S  IMPROVEMENT WITH PAIN.\par PT STATES CURRENTLY COMPLETES HOME STRETCHING PROGRAM AT HOME 4X A WEEK  AND REPORT'S MILD IMPROVEMENT WITH PAIN.  \par  [] : Post Surgical Visit: no [FreeTextEntry7] : LT LEG  [de-identified] : 2023

## 2023-04-17 ENCOUNTER — APPOINTMENT (OUTPATIENT)
Dept: PAIN MANAGEMENT | Facility: CLINIC | Age: 78
End: 2023-04-17
Payer: MEDICARE

## 2023-04-17 PROCEDURE — 99213 OFFICE O/P EST LOW 20 MIN: CPT | Mod: 95

## 2023-04-17 NOTE — HISTORY OF PRESENT ILLNESS
[Lower back] : lower back [8] : 8 [Burning] : burning [Stabbing] : stabbing [Constant] : constant [Household chores] : household chores [Sitting] : sitting [Home] : at home, [unfilled] , at the time of the visit. [Medical Office: ([facility])___] : at the medical office located in  [Verbal consent obtained from patient] : the patient, [unfilled] [de-identified] : 03/20/2023- PATIENT STATES COMPLETED PHYSICAL THERAPY ON AND OFF THE PAST YEAR IN  3X A WEEK  AND REPORT'S  IMPROVEMENT WITH PAIN.\par PT STATES CURRENTLY COMPLETES HOME STRETCHING PROGRAM AT HOME 4X A WEEK  AND REPORT'S MILD IMPROVEMENT WITH PAIN.  \par  [] : Post Surgical Visit: no [FreeTextEntry7] : LT LEG  [de-identified] : 2023

## 2023-05-10 ENCOUNTER — APPOINTMENT (OUTPATIENT)
Dept: ORTHOPEDIC SURGERY | Facility: CLINIC | Age: 78
End: 2023-05-10
Payer: MEDICARE

## 2023-05-10 PROCEDURE — 20610 DRAIN/INJ JOINT/BURSA W/O US: CPT | Mod: RT

## 2023-05-10 PROCEDURE — 99214 OFFICE O/P EST MOD 30 MIN: CPT | Mod: 25

## 2023-05-17 ENCOUNTER — APPOINTMENT (OUTPATIENT)
Dept: PAIN MANAGEMENT | Facility: CLINIC | Age: 78
End: 2023-05-17
Payer: MEDICARE

## 2023-05-17 VITALS — BODY MASS INDEX: 21.52 KG/M2 | WEIGHT: 114 LBS | HEIGHT: 61 IN

## 2023-05-17 PROCEDURE — 99213 OFFICE O/P EST LOW 20 MIN: CPT

## 2023-05-17 NOTE — HISTORY OF PRESENT ILLNESS
[Lower back] : lower back [Gradual] : gradual [7] : 7 [6] : 6 [Burning] : burning [Dull/Aching] : dull/aching [Radiating] : radiating [Sharp] : sharp [Stabbing] : stabbing [Constant] : constant [Household chores] : household chores [Work] : work [Sleep] : sleep [Rest] : rest [Meds] : meds [Ice] : ice [Heat] : heat [Sitting] : sitting [Walking] : walking [Retired] : Work status: retired [] : This patient has had an injection before: no [FreeTextEntry6] : SPASMS , NUMBNESS  [FreeTextEntry7] : LUMBAR DOWN TO FEET  [FreeTextEntry9] : [employer]  [de-identified] : 2023-05-12 [de-identified] : PT GOES TO PHYSICAL THERAPY 2X WEEK AND EXERCISES AT HOME REGULARLY

## 2023-06-03 ENCOUNTER — OUTPATIENT (OUTPATIENT)
Dept: OUTPATIENT SERVICES | Facility: HOSPITAL | Age: 78
LOS: 1 days | End: 2023-06-03
Payer: MEDICARE

## 2023-06-03 ENCOUNTER — APPOINTMENT (OUTPATIENT)
Dept: CT IMAGING | Facility: CLINIC | Age: 78
End: 2023-06-03
Payer: MEDICARE

## 2023-06-03 ENCOUNTER — RESULT REVIEW (OUTPATIENT)
Age: 78
End: 2023-06-03

## 2023-06-03 DIAGNOSIS — Z98.89 OTHER SPECIFIED POSTPROCEDURAL STATES: Chronic | ICD-10-CM

## 2023-06-03 DIAGNOSIS — Z96.89 PRESENCE OF OTHER SPECIFIED FUNCTIONAL IMPLANTS: Chronic | ICD-10-CM

## 2023-06-03 DIAGNOSIS — M54.9 DORSALGIA, UNSPECIFIED: Chronic | ICD-10-CM

## 2023-06-03 DIAGNOSIS — Z98.1 ARTHRODESIS STATUS: Chronic | ICD-10-CM

## 2023-06-03 DIAGNOSIS — Z98.49 CATARACT EXTRACTION STATUS, UNSPECIFIED EYE: Chronic | ICD-10-CM

## 2023-06-03 DIAGNOSIS — G03.9 MENINGITIS, UNSPECIFIED: ICD-10-CM

## 2023-06-03 DIAGNOSIS — Z98.890 OTHER SPECIFIED POSTPROCEDURAL STATES: Chronic | ICD-10-CM

## 2023-06-03 PROCEDURE — G1004: CPT

## 2023-06-03 PROCEDURE — 72131 CT LUMBAR SPINE W/O DYE: CPT | Mod: ME

## 2023-06-03 PROCEDURE — 76376 3D RENDER W/INTRP POSTPROCES: CPT

## 2023-06-03 PROCEDURE — 72131 CT LUMBAR SPINE W/O DYE: CPT | Mod: 26,ME

## 2023-06-03 PROCEDURE — 76376 3D RENDER W/INTRP POSTPROCES: CPT | Mod: 26

## 2023-06-07 ENCOUNTER — APPOINTMENT (OUTPATIENT)
Dept: ORTHOPEDIC SURGERY | Facility: CLINIC | Age: 78
End: 2023-06-07
Payer: MEDICARE

## 2023-06-07 PROCEDURE — 20611 DRAIN/INJ JOINT/BURSA W/US: CPT | Mod: RT

## 2023-06-16 ENCOUNTER — APPOINTMENT (OUTPATIENT)
Dept: PAIN MANAGEMENT | Facility: CLINIC | Age: 78
End: 2023-06-16
Payer: MEDICARE

## 2023-06-16 PROCEDURE — 99213 OFFICE O/P EST LOW 20 MIN: CPT | Mod: 95

## 2023-06-16 NOTE — HISTORY OF PRESENT ILLNESS
[Lower back] : lower back [Gradual] : gradual [7] : 7 [6] : 6 [Burning] : burning [Dull/Aching] : dull/aching [Radiating] : radiating [Sharp] : sharp [Stabbing] : stabbing [Constant] : constant [Household chores] : household chores [Work] : work [Sleep] : sleep [Rest] : rest [Meds] : meds [Ice] : ice [Heat] : heat [Sitting] : sitting [Walking] : walking [Retired] : Work status: retired [Home] : at home, [unfilled] , at the time of the visit. [Medical Office: ([facility])___] : at the medical office located in  [Verbal consent obtained from patient] : the patient, [unfilled] [] : This patient has had an injection before: no [FreeTextEntry6] : SPASMS , NUMBNESS  [FreeTextEntry7] : LUMBAR DOWN TO FEET  [FreeTextEntry9] : [employer]  [de-identified] : 2023-05-12 [de-identified] : PT GOES TO PHYSICAL THERAPY 2X WEEK AND EXERCISES AT HOME REGULARLY

## 2023-06-16 NOTE — ASSESSMENT
[FreeTextEntry1] : Interim history\par The patient is tolerating their medications without problems. There has no new pains, injuries, or complaints and no new issues. The use of medications appears appropriate and there are no aberrant behaviors noted. Side effects to current medications are denied. Average pain score for the month is 6 out of ten. The patient's current medications are documented to the best of their ability. THe  was obtained and reviewed prior to the visit, and any discrepancies were discussed with the patient.\par Objective information\par Since the last visit there are no additional radiologic studies, labs, or pain complaints. There are no changes in the patient's physical status.\par Plan\par The patient was given refill of their medication at their current level and will return to the office as needed for follow-up. The patient is showing no aberrant behavior or evidence of diversion. Opioid contract and opioid risk assessment on chart.  reviewed and any discrepancy discussed with patent. Applicable urine toxicology reviewed and recorded in the patient's electronic record. Urine toxicology is ordered for patient per office protocol or patient's risk assessment.  Patient will follow-up in 1 month unless new issues arise the patient has returned earlier.\par 
General

## 2023-07-19 ENCOUNTER — APPOINTMENT (OUTPATIENT)
Dept: ORTHOPEDIC SURGERY | Facility: CLINIC | Age: 78
End: 2023-07-19
Payer: MEDICARE

## 2023-07-19 VITALS — WEIGHT: 114 LBS | HEIGHT: 61 IN | BODY MASS INDEX: 21.52 KG/M2

## 2023-07-19 PROCEDURE — 99213 OFFICE O/P EST LOW 20 MIN: CPT | Mod: 25

## 2023-07-19 PROCEDURE — 20611 DRAIN/INJ JOINT/BURSA W/US: CPT | Mod: LT

## 2023-07-19 NOTE — DISCUSSION/SUMMARY
[de-identified] : I went over the pathophysiology of the patient's symptoms in great detail with the patient. The patient elected to receive a Monovisc  injection into her left knee today, and tolerated it well. I instructed the patient on ROM exercises, and told them to take it easy. The use of ice and rest was reviewed with the patient. The patient may resume activities tomorrow. I reminded the patient that it takes 4 to 6 weeks after the final injection to feel symptom relief. \par \par All of their questions were answered. They understand and consent to the plan. \par \par FU in 6 weeks.

## 2023-07-19 NOTE — PHYSICAL EXAM
[de-identified] : Constitutional\par o Appearance : well-nourished, well developed, alert, in no acute distress \par Head and Face\par o Head :\par ¦ Inspection : atraumatic, normocephalic\par o Face :\par ¦ Inspection : no visible rash or discoloration\par Respiratory\par o Respiratory Effort: breathing unlabored \par Neurologic\par o Mental Status Examination :\par ¦ Orientation : alert and oriented X 3\par Psychiatric\par o Mood and Affect: mood normal, affect appropriate\par Cardiovascular\par o Observation/Palpation : - no swelling\par Lymphatic\par o Additional Nodes : No palpable lymph nodes present\par \par Lumbosacral Spine\par o Inspection : no visible rash or discoloration, well-healed extensive scar\par o Palpation : no paraspinal musculature tenderness\par o Range of Motion : limited motion of the LS spine; extension causes left buttock pain, rotation does not cause discomfort \par o Muscle Strength : paraspinal muscle strength and tone within normal limits\par o Muscle Tone : paraspinal muscle strength and tone within normal limits\par o Tests: straight leg test negative and SARAI test negative bilaterally \par  \par Right Lower Extremity\par o Buttock : no tenderness, swelling or deformities\par o Right Hip :\par ¦ Inspection/Palpation : no tenderness, no swelling or deformities\par ¦ Range of Motion : full flexion and rotation,  no crepitance\par ¦ Stability : joint stability intact\par ¦ Strength : extension, flexion 5/5, adduction, abduction 5/5, internal rotation and external rotation\par \par o Right Knee :\par ¦ Inspection/Palpation : anteromedial compartment tenderness, no lateral compartment tenderness to palpation, no swelling\par ¦ Range of Motion : 0-120°, no crepitance, good patellofemoral glide \par ¦ Stability : no valgus or varus instability present on provocative testing\par ¦ Strength : flexion 5/5, extension 5/5\par ¦ Tests : negative Anterior Drawer, negative Lachman, negative Rikki \par \par Left Lower Extremity\par o Buttock : sciatic notch tenderness, no swelling or deformities \par o Left Hip :\par ¦ Inspection/Palpation : greater trochanteric and IT band tenderness, no swelling or deformities\par ¦ Range of Motion : full flexion, pain with rotation, negative pain with log rolling\par ¦ Stability : joint stability intact\par ¦ Strength : extension, flexion 5/5, adduction, abduction 4/5 but limited by pain, internal rotation and external rotation\par \par o Left Knee :\par ¦ Inspection/Palpation : posteromedial joint line tenderness to palpation, no swelling\par ¦ Range of Motion : 0-140°, no crepitance, good patellofemoral glide \par ¦ Stability : no valgus or varus instability present on provocative testing\par ¦ Strength : flexion 5/5, extension 5/5\par ¦ Tests and Signs : all tests for stability normal\par o Reflexes : patellar tendon reflex 2+, ankle reflex 2+, Babinski sign absent (toes downgoing) \par  \par Gait: slight list to the right, no significant extremity swelling or lymphedema, equal leg lengths, no foot drop, reasonable core strength\par \par Radiology Results\par o Pelvis & Left Hip : AP pelvis and lateral LEFT hip were obtained and revealed a fusion from L3 to the sacrum. Mild degenerative arthritis of both hips. Spinal stimulator present. \par \par Constitutional\par o Appearance : well-nourished, well developed, alert, in no acute distress \par Head and Face\par o Head :\par ¦ Inspection : atraumatic, normocephalic\par o Face :\par ¦ Inspection : no visible rash or discoloration\par Respiratory\par o Respiratory Effort: breathing unlabored \par Neurologic\par o Mental Status Examination :\par ¦ Orientation : alert and oriented X 3\par Psychiatric\par o Mood and Affect: mood normal, affect appropriate\par Cardiovascular\par o Observation/Palpation : - no swelling\par Lymphatic\par o Additional Nodes : No palpable lymph nodes present\par \par Lumbosacral Spine\par o Inspection : no visible rash or discoloration, well-healed incision \par o Palpation : no paraspinal musculature tenderness\par o Range of Motion : extension does not cause discomfort, sidebending is very limited, rotation to the left is more limited than to the right \par o Muscle Strength : paraspinal muscle strength and tone within normal limits\par o Muscle Tone : paraspinal muscle strength and tone within normal limits\par o Tests: straight leg test negative and SARAI test negative bilaterally \par \par Right Lower Extremity\par o Buttock : no tenderness, swelling or deformities \par o Right Hip :\par ¦ Inspection/Palpation : no tenderness, swelling or deformities\par ¦ Range of Motion : full and painless in all planes, no crepitance\par ¦ Stability : joint stability intact\par ¦ Strength : extension, flexion, adduction, abduction, internal rotation and external rotation, 5/5\par \par o Right Knee :\par ¦ Inspection/Palpation : medial / mild lateral compartment tenderness to palpation, no swelling\par ¦ Range of Motion : 0-125 active flexion and extension full and painless, no crepitance,  decreased patellofemoral glide \par ¦ Stability : no valgus or varus instability present on provocative testing\par ¦ Strength : flexion and extension 5/5\par ¦ Tests and Signs : negative Anterior Drawer, negative Lachman, negative Rikki\par \par Left Lower Extremity\par o Buttock : no tenderness, swelling or deformities \par o Left Hip :\par ¦ Inspection/Palpation : no tenderness, no swelling or deformities\par ¦ Range of Motion : full and painless in all planes, no crepitance\par ¦ Stability : joint stability intact\par ¦ Strength : extension, flexion, adduction, abduction, internal rotation and external rotation, 5/5\par \par o Left Knee :\par ¦ Inspection/Palpation : no tenderness to palpation, no swelling\par ¦ Range of Motion :0-125 active flexion and extension full and painless, no crepitance decreased patellofemoral glide \par ¦ Stability : no valgus or varus instability present on provocative testing\par ¦ Strength : flexion and extension 5/5\par ¦ Tests and Signs : negative Anterior Drawer, negative Lachman, negative Rikki\par \par Gait and Station:\par Gait: gait normal, no significant extremity swelling or lymphedema, good proprioception and balance, improving core strength\par \par o Left  Knee injection : Indication- knee osteoarthritis, Anatomic location- left intra-articular joint space, Spray - area was sterilized with Betadine and alcohol and anesthetized with Ethyl Chloride , needle used-20G, Medications given- 4cc's Monovisc under Ultrasound guidance. The patient tolerated the procedure well. Lot# [id number] Exp# [id number]

## 2023-07-19 NOTE — HISTORY OF PRESENT ILLNESS
[de-identified] : 77 year old female presents for a left knee Monovisc injection today 07/19/2023. She notes 80% improvement after the gel injection in June. She notes occasional sharp pain across the knee cap in the right knee knee. \par PMHx of DM controlled by diet, A1c 5.8%.\par \par Radiology Results from 2/16/2023:\par o Pelvis & Left Hip : AP pelvis and lateral LEFT hip were obtained and revealed a fusion from L3 to the sacrum. Mild degenerative arthritis of both hips. Spinal stimulator present. \par \par Radiology Results from 10/31/2022: \par o Right Knee : Standing AP, lateral, tunnel, and skyline views of the knee were obtained and reveal moderate medial and patellofemoral arthritis.\par o Left Knee : Standing AP, lateral, tunnel, and skyline views of the knee were obtained and reveal moderate medial and patellofemoral arthritis. \par \par CT Scan of the Right Hip obtained on 10/21/2021 revealed:\par 1.  Moderate posterior right hip arthrosis.\par 2.  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  Moderate right fat-containing inguinal hernia.\par 4.  Mild tendinosis of the gluteus medius insertion with small calcifications. Mild edema in the right greater trochanteric bursa.

## 2023-07-19 NOTE — ADDENDUM
[FreeTextEntry1] : I, ANGIERACHID MTZ, acted solely as a scribe for Dr. Heriberto Buenrostro on this date 07/19/2023.\par \par All medical record entries made by the Scribe were at my, Dr. Heriberto Buenrostro, direction and personally dictated by me on 07/19/2023. I have reviewed the chart and agree that the record accurately reflects my personal performance of the history, physical exam, assessment and plan. I have also personally directed, reviewed, and agreed with the chart.

## 2023-07-26 ENCOUNTER — APPOINTMENT (OUTPATIENT)
Dept: PAIN MANAGEMENT | Facility: CLINIC | Age: 78
End: 2023-07-26
Payer: MEDICARE

## 2023-07-26 VITALS — HEIGHT: 61 IN | BODY MASS INDEX: 21.52 KG/M2 | WEIGHT: 114 LBS

## 2023-07-26 PROCEDURE — 99213 OFFICE O/P EST LOW 20 MIN: CPT | Mod: 95

## 2023-07-26 NOTE — HISTORY OF PRESENT ILLNESS
[Lower back] : lower back [Gradual] : gradual [7] : 7 [6] : 6 [Burning] : burning [Sharp] : sharp [Stabbing] : stabbing [Throbbing] : throbbing [Tightness] : tightness [Constant] : constant [Household chores] : household chores [Work] : work [Sleep] : sleep [Rest] : rest [Meds] : meds [Ice] : ice [Heat] : heat [Sitting] : sitting [Walking] : walking [Retired] : Work status: retired [Home] : at home, [unfilled] , at the time of the visit. [Medical Office: ([facility])___] : at the medical office located in  [Verbal consent obtained from patient] : the patient, [unfilled] [] : This patient has had an injection before: no [FreeTextEntry9] : [employer]  [de-identified] : 2023-05-12 [de-identified] : HOME EXERCISES DAILY 5-7X WEEK WALKING , STRETCHING, LEG LIFTS, AND UPPER BODY WORKOUTS

## 2023-08-23 ENCOUNTER — APPOINTMENT (OUTPATIENT)
Dept: PAIN MANAGEMENT | Facility: CLINIC | Age: 78
End: 2023-08-23
Payer: MEDICARE

## 2023-08-23 VITALS — WEIGHT: 114 LBS | BODY MASS INDEX: 21.52 KG/M2 | HEIGHT: 61 IN

## 2023-08-23 PROCEDURE — 99213 OFFICE O/P EST LOW 20 MIN: CPT

## 2023-08-23 NOTE — HISTORY OF PRESENT ILLNESS
[Lower back] : lower back [Gradual] : gradual [7] : 7 [6] : 6 [Burning] : burning [Sharp] : sharp [Stabbing] : stabbing [Throbbing] : throbbing [Tightness] : tightness [Constant] : constant [Household chores] : household chores [Work] : work [Sleep] : sleep [Rest] : rest [Meds] : meds [Ice] : ice [Heat] : heat [Sitting] : sitting [Walking] : walking [Retired] : Work status: retired [] : This patient has had an injection before: no [FreeTextEntry9] : [employer]  [de-identified] : 2023-05-12 [de-identified] : HOME EXERCISES DAILY 5-7X WEEK WALKING , STRETCHING, LEG LIFTS, AND UPPER BODY WORKOUTS

## 2023-09-12 ENCOUNTER — APPOINTMENT (OUTPATIENT)
Dept: PULMONOLOGY | Facility: CLINIC | Age: 78
End: 2023-09-12
Payer: MEDICARE

## 2023-09-12 VITALS
TEMPERATURE: 96.6 F | DIASTOLIC BLOOD PRESSURE: 58 MMHG | OXYGEN SATURATION: 98 % | HEIGHT: 61 IN | BODY MASS INDEX: 21.52 KG/M2 | HEART RATE: 85 BPM | RESPIRATION RATE: 16 BRPM | SYSTOLIC BLOOD PRESSURE: 132 MMHG | WEIGHT: 114 LBS

## 2023-09-12 DIAGNOSIS — J47.9 BRONCHIECTASIS, UNCOMPLICATED: ICD-10-CM

## 2023-09-12 PROCEDURE — ZZZZZ: CPT

## 2023-09-12 PROCEDURE — 94729 DIFFUSING CAPACITY: CPT

## 2023-09-12 PROCEDURE — 94010 BREATHING CAPACITY TEST: CPT

## 2023-09-12 PROCEDURE — 95012 NITRIC OXIDE EXP GAS DETER: CPT

## 2023-09-12 PROCEDURE — 94727 GAS DIL/WSHOT DETER LNG VOL: CPT

## 2023-09-12 PROCEDURE — 99214 OFFICE O/P EST MOD 30 MIN: CPT | Mod: 25

## 2023-09-18 ENCOUNTER — APPOINTMENT (OUTPATIENT)
Dept: ORTHOPEDIC SURGERY | Facility: CLINIC | Age: 78
End: 2023-09-18

## 2023-09-20 NOTE — PHARMACOTHERAPY INTERVENTION NOTE - COMMENTS
Patient called stating she is 2 wks nicotine free and would like to schedule surgery with Dr Chester Stinson. States she would like to schedule the 1 st week of November.   Ph#  [phone]
Patient is a 76 year old female w/ PMHx chronic back pain (s/p multiple spinal surgeries, including cervical ACDF and lumbar laminectomy, on morphine 60mg QD), HTN, T2DM, migraine, hypothyroid, Raynaud's, who presents for confusion and difficulty ambulating. Serum creatinine on admission 1.39, now on 8/30 0.85 with creatinine clearance 44ml/min. Based on renal function, recommended to decrease famotidine 20mg twice daily to famotidine 20mg once daily. Patient is on amlodipine 10mg once daily and losartan/hydrochlorothiazide 100mg-12.5mg once daily for HTN at home. Patient's BP has been elevated 8/30 (152/74, 149/76). Recommended to add losartan 100mg once daily, and not add hydrochlorothiazide at this time due to risk of diuresis/dehydration. Patient already on amlodipine 10mg once daily.  Discussed with Dr. Wade.     Florina Stoner, PharmD   PGY-1 Pharmacy Resident   Spectra: [phone]  Available on Teams
Spoke to patient about indication, directions, and side effect profile of medications. Informed patient of anticoagulant use in hospital, formulary changes, and other changes made in hospital. Inpatient medication list provided.    Florina Stoner, PharmD   PGY-1 Pharmacy Resident   Spectra: [phone]  Available on Teams

## 2023-09-25 ENCOUNTER — APPOINTMENT (OUTPATIENT)
Dept: PAIN MANAGEMENT | Facility: CLINIC | Age: 78
End: 2023-09-25
Payer: MEDICARE

## 2023-09-25 PROCEDURE — 99213 OFFICE O/P EST LOW 20 MIN: CPT | Mod: 95

## 2023-10-04 ENCOUNTER — APPOINTMENT (OUTPATIENT)
Dept: ORTHOPEDIC SURGERY | Facility: CLINIC | Age: 78
End: 2023-10-04
Payer: MEDICARE

## 2023-10-04 DIAGNOSIS — M47.816 SPONDYLOSIS W/OUT MYELOPATHY OR RADICULOPATHY, LUMBAR REGION: ICD-10-CM

## 2023-10-04 PROCEDURE — 99214 OFFICE O/P EST MOD 30 MIN: CPT

## 2023-10-04 PROCEDURE — 73564 X-RAY EXAM KNEE 4 OR MORE: CPT | Mod: 50

## 2023-10-25 ENCOUNTER — APPOINTMENT (OUTPATIENT)
Dept: PAIN MANAGEMENT | Facility: CLINIC | Age: 78
End: 2023-10-25
Payer: MEDICARE

## 2023-10-25 PROCEDURE — 99213 OFFICE O/P EST LOW 20 MIN: CPT | Mod: 95

## 2023-11-18 ENCOUNTER — OUTPATIENT (OUTPATIENT)
Dept: OUTPATIENT SERVICES | Facility: HOSPITAL | Age: 78
LOS: 1 days | End: 2023-11-18
Payer: MEDICARE

## 2023-11-18 ENCOUNTER — APPOINTMENT (OUTPATIENT)
Dept: CT IMAGING | Facility: CLINIC | Age: 78
End: 2023-11-18
Payer: MEDICARE

## 2023-11-18 DIAGNOSIS — Z98.89 OTHER SPECIFIED POSTPROCEDURAL STATES: Chronic | ICD-10-CM

## 2023-11-18 DIAGNOSIS — J47.9 BRONCHIECTASIS, UNCOMPLICATED: ICD-10-CM

## 2023-11-18 DIAGNOSIS — Z98.890 OTHER SPECIFIED POSTPROCEDURAL STATES: Chronic | ICD-10-CM

## 2023-11-18 DIAGNOSIS — Z98.49 CATARACT EXTRACTION STATUS, UNSPECIFIED EYE: Chronic | ICD-10-CM

## 2023-11-18 DIAGNOSIS — M54.9 DORSALGIA, UNSPECIFIED: Chronic | ICD-10-CM

## 2023-11-18 DIAGNOSIS — Z96.89 PRESENCE OF OTHER SPECIFIED FUNCTIONAL IMPLANTS: Chronic | ICD-10-CM

## 2023-11-18 DIAGNOSIS — R91.8 OTHER NONSPECIFIC ABNORMAL FINDING OF LUNG FIELD: ICD-10-CM

## 2023-11-18 DIAGNOSIS — Z98.1 ARTHRODESIS STATUS: Chronic | ICD-10-CM

## 2023-11-18 PROCEDURE — 71250 CT THORAX DX C-: CPT

## 2023-11-18 PROCEDURE — 71250 CT THORAX DX C-: CPT | Mod: 26,MH

## 2023-11-22 ENCOUNTER — APPOINTMENT (OUTPATIENT)
Dept: PAIN MANAGEMENT | Facility: CLINIC | Age: 78
End: 2023-11-22
Payer: MEDICARE

## 2023-11-22 PROCEDURE — 99213 OFFICE O/P EST LOW 20 MIN: CPT | Mod: 95

## 2023-12-01 NOTE — ASU PATIENT PROFILE, ADULT - TEACHING/LEARNING DEVELOPMENTAL CONSIDERATIONS
ASSESSMENT:  71F w/ PMHx of Afib (on Xarelto), h/o DVT/PE, s/p IVC filter, HTN, hypothyroidism, COPD/emphysema, ex-smoker (quit >16 years ago) who presents as a TRAUMA ALERT (transfer from [facility]) s/p mechanical fall (-HT -LOC +AC). ABCs intact, GCS 15.     Injuries identified:   Comminuted right acetabular fracture w/ iliopsoas hematoma    PLAN:  - Hemodynamic monitoring  - Adequate pain control  - Replete electrolytes as needed  - Cardiology: moderate risk, pulmonology: high risk  - F/U OR plans with Ortho  - Bedrest NWB  - Urinalysis negative  - Rest of care per SICU  - Surgery to follow closely    [phone] Assessment & Plan    71y Female s/p mechanical fall, with acute R comminuted acetabular fracture.      NEURO:  #Acute pain  - tylenol standing   - oxycodone 2.5 mg q4 prn moderate pain   - oxycodone 5 mg Q6 PRN severe pain        RESP:   #Oxygenation    RA   #Hx of emphysema (not on home O2)   -Continue spiriva   -Continue advair   #Activity    -Bedrest per ortho  -pulm: high risk for surgery. Keep inhalers       CARDS:   #Hematoma iliopsoas/retroperitoneal hematoma   - trend CBC stable x3 s/p 1 unit PRBCs. Hbg 10.0    - Echo pending, cardio clearance: hold xarelto 48 hrs prior to procedure    #Hx Afib   -Continue metoprolol 25 q12   -Holding home Xarelto  (last dose 11/28/23)   -Rate controlled  #Hx HTN   - Lisinopril 10 q12 held   Rx-lisinopril 10 two times a day  metoprolol tartrate 50 daily      GI/NUTR:   #Diet DASH Full       -aspiration precautions, HOB 30  #GI Prophylaxis    -not indicated  #Hx HLD    - Continue atorvastatin 10mg  #Bowel regimen    -senna    -miralax     -last bowel movement: Wednesday        /RENAL:   #urine output in crtically ill primafit  IVL    Labs:    Labs:          BUN/Cr- 20/0.7  -->,  17/0.7  -->          Electrolytes-Na 138 // K 4.2 // Mg 2.2 //  Phos 4.6 (12-01 @ 00:40)           #maintain euvolemia             HEME/ONC:   #Hx DVT/PE  - s/p IVC filter placed 10 years ago  #DVT prophylaxsis: therepauetic lovenox     Labs: Hb/Hct:  10.0/30.4  (11-30 @ 13:33)  -->,  8.8/26.5  (12-01 @ 00:40)  -->                      Plts:  305  -->,  271  -->                 PTT/INR:  --/1.16  --->           ENDO:  #Hx hypothyroidism   -Levothyroxine per patient schedule    -Glucose goal 140-180    -if above 180 start ISS    MSK:  #R comminuted acetabular fx  -cardio and pulm: high risk   -Ortho following:  high risk, pt and family will decide. ok with dvt pphx and RLE TTWB PT    - Activity - Weight Bearing Status:     Left Lower Extremity:  Non-Weight Bearing    Right Lower Extremity:  Non-Weight Bearing (11-30-23 @ 04:00)  Activity - Bedrest (11-30-23 @ 04:00)  R knee and R elbow xrays: no fracture     LINES/DRAINS:  PIV    ADVANCED DIRECTIVES:  Full Code    HCP/Emergency Contact-    INDICATION FOR SICU: Hypotension s/p mechanical fall      DISPO: Surgical floor > 4C  none

## 2023-12-22 ENCOUNTER — APPOINTMENT (OUTPATIENT)
Dept: PAIN MANAGEMENT | Facility: CLINIC | Age: 78
End: 2023-12-22
Payer: MEDICARE

## 2023-12-22 PROCEDURE — 99213 OFFICE O/P EST LOW 20 MIN: CPT | Mod: 95

## 2023-12-22 RX ORDER — LIDOCAINE 5% 50 MG/G
5 CREAM TOPICAL
Qty: 1 | Refills: 5 | Status: ACTIVE | COMMUNITY
Start: 2023-11-22 | End: 1900-01-01

## 2023-12-22 NOTE — ASSESSMENT
[FreeTextEntry1] : Interim history The patient is tolerating their medications without problems. There has no new pains, injuries, or complaints and no new issues. The use of medications appears appropriate and there are no aberrant behaviors noted. Side effects to current medications are denied. Average pain score for the month is 6  out of ten. The patient's current medications are documented to the best of their ability. THe  was obtained and reviewed prior to the visit, and any discrepancies were discussed with the patient. Objective information Since the last visit there are no additional radiologic studies, labs, or pain complaints. There are no changes in the patient's physical status. Plan The patient was given refill of their medication at their current level and will return to the office as needed for follow-up. The patient is showing no aberrant behavior or evidence of diversion. Opioid contract and opioid risk assessment on chart.  reviewed and any discrepancy discussed with patent. Applicable urine toxicology reviewed and recorded in the patient's electronic record. Urine toxicology is ordered for patient per office protocol or patient's risk assessment.  Patient will follow-up in 1 month unless new issues arise the patient has returned earlier.

## 2023-12-22 NOTE — HISTORY OF PRESENT ILLNESS
[Lower back] : lower back [Gradual] : gradual [7] : 7 [6] : 6 [Burning] : burning [Sharp] : sharp [Stabbing] : stabbing [Throbbing] : throbbing [Tightness] : tightness [Constant] : constant [Household chores] : household chores [Work] : work [Sleep] : sleep [Rest] : rest [Meds] : meds [Ice] : ice [Heat] : heat [Sitting] : sitting [Walking] : walking [Retired] : Work status: retired [Home] : at home, [unfilled] , at the time of the visit. [Medical Office: ([facility])___] : at the medical office located in  [Verbal consent obtained from patient] : the patient, [unfilled] [FreeTextEntry1] : THE PATIENT IS 77 year YEARS OLD RT HAND DOMINANT F WHO PRESENT TODAY IN OFFICE WITH LUMBAR SPINE DOWN LT LEG PAIN FOR MNTHLY PAIN MED REFILL.   LAST UDS: 08/23/2023    [] : This patient has had an injection before: no [FreeTextEntry9] : [employer]  [de-identified] : 2023-05-12 [de-identified] : HOME EXERCISES DAILY 5-7X WEEK WALKING , STRETCHING, LEG LIFTS, AND UPPER BODY WORKOUTS

## 2024-01-24 ENCOUNTER — APPOINTMENT (OUTPATIENT)
Dept: PAIN MANAGEMENT | Facility: CLINIC | Age: 79
End: 2024-01-24
Payer: MEDICARE

## 2024-01-24 PROCEDURE — 99213 OFFICE O/P EST LOW 20 MIN: CPT

## 2024-01-24 NOTE — HISTORY OF PRESENT ILLNESS
[Lower back] : lower back [Gradual] : gradual [7] : 7 [6] : 6 [Burning] : burning [Sharp] : sharp [Stabbing] : stabbing [Throbbing] : throbbing [Tightness] : tightness [Constant] : constant [Household chores] : household chores [Work] : work [Sleep] : sleep [Rest] : rest [Meds] : meds [Ice] : ice [Heat] : heat [Sitting] : sitting [Walking] : walking [Retired] : Work status: retired [FreeTextEntry1] : BANDAR RODRÍGUEZ IS FOLLOWING UP FOR PAIN MED REFILL, THERE HAS _ BEEN CHANGES IN PAIN SINCE LAST VISIT.   LAST UDS: 01/24/2024    [] : This patient has had an injection before: no [FreeTextEntry9] : [employer]  [de-identified] : 2023-05-12 [de-identified] : HOME EXERCISES DAILY 5-7X WEEK WALKING , STRETCHING, LEG LIFTS, AND UPPER BODY WORKOUTS

## 2024-02-23 ENCOUNTER — APPOINTMENT (OUTPATIENT)
Dept: PAIN MANAGEMENT | Facility: CLINIC | Age: 79
End: 2024-02-23
Payer: MEDICARE

## 2024-02-23 DIAGNOSIS — G89.29 OTHER CHRONIC PAIN: ICD-10-CM

## 2024-02-23 PROCEDURE — 99213 OFFICE O/P EST LOW 20 MIN: CPT

## 2024-02-23 RX ORDER — NALOXONE HYDROCHLORIDE 4 MG/.1ML
4 SPRAY NASAL
Qty: 1 | Refills: 0 | Status: ACTIVE | COMMUNITY
Start: 2023-08-23 | End: 1900-01-01

## 2024-02-23 RX ORDER — ROSUVASTATIN CALCIUM 5 MG/1
TABLET, FILM COATED ORAL
Refills: 0 | Status: ACTIVE | COMMUNITY

## 2024-02-23 NOTE — HISTORY OF PRESENT ILLNESS
[Lower back] : lower back [Gradual] : gradual [9] : 9 [6] : 6 [Burning] : burning [Sharp] : sharp [Throbbing] : throbbing [Stabbing] : stabbing [Tightness] : tightness [Constant] : constant [Household chores] : household chores [Work] : work [Sleep] : sleep [Rest] : rest [Meds] : meds [Heat] : heat [Ice] : ice [Sitting] : sitting [Walking] : walking [Retired] : Work status: retired [FreeTextEntry1] : States she maintains a functional ADL. States her back pain has limited her social activities at times due to prolonged sitting. Meds effective.  [] : This patient has had an injection before: no [FreeTextEntry9] : [employer]  [de-identified] : 2023-05-12 [de-identified] : HOME EXERCISES DAILY 5-7X WEEK WALKING , STRETCHING, LEG LIFTS, AND UPPER BODY WORKOUTS

## 2024-02-23 NOTE — REASON FOR VISIT
[Follow-Up Visit] : a follow-up pain management visit [Home] : at home, [unfilled] , at the time of the visit. [Medical Office: ([facility])___] : at the medical office located in  [Patient] : the patient [FreeTextEntry2] : MED REFILL  [Self] : self

## 2024-02-23 NOTE — DISCUSSION/SUMMARY
[Medication Risks Reviewed] : Medication risks reviewed [de-identified] : Prescriptions renewed. Opioid agreement/obtained on chart [place]  reviewed and appropriate. SOAPP-R completed on chart. The patient's medications are documented to the best of their ability. Quality of life and functional ability improved on medications. The patient is showing no aberrant behavior or evidence of diversion. The patient was advised not to use narcotic medication while operating an automobile or heavy machinery due to potential sedation or dizziness. The patient was educated to the risks associated with potential opioid dependence and addiction. Urine toxicology screens as per office protocol. Use of multimodal analgesia used prn. Follow up one month.

## 2024-02-29 ENCOUNTER — APPOINTMENT (OUTPATIENT)
Dept: ORTHOPEDIC SURGERY | Facility: CLINIC | Age: 79
End: 2024-02-29

## 2024-03-12 ENCOUNTER — APPOINTMENT (OUTPATIENT)
Dept: PULMONOLOGY | Facility: CLINIC | Age: 79
End: 2024-03-12
Payer: MEDICARE

## 2024-03-12 VITALS
HEART RATE: 78 BPM | RESPIRATION RATE: 16 BRPM | SYSTOLIC BLOOD PRESSURE: 140 MMHG | BODY MASS INDEX: 21.9 KG/M2 | HEIGHT: 61 IN | TEMPERATURE: 96.8 F | DIASTOLIC BLOOD PRESSURE: 70 MMHG | OXYGEN SATURATION: 98 % | WEIGHT: 116 LBS

## 2024-03-12 DIAGNOSIS — J39.8 OTHER SPECIFIED DISEASES OF UPPER RESPIRATORY TRACT: ICD-10-CM

## 2024-03-12 DIAGNOSIS — K21.9 GASTRO-ESOPHAGEAL REFLUX DISEASE W/OUT ESOPHAGITIS: ICD-10-CM

## 2024-03-12 DIAGNOSIS — J31.0 CHRONIC RHINITIS: ICD-10-CM

## 2024-03-12 DIAGNOSIS — R06.02 SHORTNESS OF BREATH: ICD-10-CM

## 2024-03-12 DIAGNOSIS — J45.909 UNSPECIFIED ASTHMA, UNCOMPLICATED: ICD-10-CM

## 2024-03-12 DIAGNOSIS — R91.8 OTHER NONSPECIFIC ABNORMAL FINDING OF LUNG FIELD: ICD-10-CM

## 2024-03-12 DIAGNOSIS — J30.9 ALLERGIC RHINITIS, UNSPECIFIED: ICD-10-CM

## 2024-03-12 PROCEDURE — 99214 OFFICE O/P EST MOD 30 MIN: CPT | Mod: 25

## 2024-03-12 PROCEDURE — 94010 BREATHING CAPACITY TEST: CPT

## 2024-03-12 PROCEDURE — 95012 NITRIC OXIDE EXP GAS DETER: CPT

## 2024-03-12 RX ORDER — FLUTICASONE FUROATE AND VILANTEROL 200; 25 UG/1; UG/1
200-25 POWDER RESPIRATORY (INHALATION)
Qty: 3 | Refills: 1 | Status: ACTIVE | COMMUNITY
Start: 2018-05-23 | End: 1900-01-01

## 2024-03-12 RX ORDER — OLOPATADINE HYDROCHLORIDE 665 UG/1
0.6 SPRAY, METERED NASAL
Qty: 1 | Refills: 3 | Status: ACTIVE | COMMUNITY
Start: 2024-03-12 | End: 1900-01-01

## 2024-03-12 NOTE — ASSESSMENT
[FreeTextEntry1] : Ms. Kenney is a 78 year old female who has a history of DM, HLD, HTN, neurontin, hypothyroid, IBS, OA, asthma, allergy, and abnormal CT scan, bronchiectasis, GERD, and orthopedic issues. She is currently stable from a pulmonary perspective - except back pain (still) progressive- PNDrip/Bronchospasm  Problem 1: Bronchiectasis - Recommended Acapella device. Seen on the CT of the chest or chest x-ray signifies damaged bronchial tubes focal or diffuse which can be sites of recurrent infections. These areas can be colonized by various organisms including bacteria (hemophilous influenzae/Pseudomonas species etc.) as well as acid fast bacilli (mycobacterial disease- inclusive of TB/MACI etc.). Sputum either for bacteria culture/sensitivity or AFB culture and sensitivity will need to be sent if the patient has sputum- 3 specimens on consecutive days will need to be dropped at the laboratory- if the patient can produce sputum.  problem 2: asthma (active) -continue to use Breo Ellipta 200 at 1 inhalation QD (Noncompliant- needs to improve) -continue Ventolin as a rescue inhaler 2 inhalations pre-exercise  -Asthma is  believed to be caused by inherited (genetic) and environmental factor, but its exact cause is unknown. Asthma may be triggered by allergens, lung infections, or irritants in the air. Asthma triggers are different for each person -Inhaler technique reviewed as well as oral hygiene techniques reviewed with patient. Avoidance of cold air, extremes of temperature, rescue inhaler should be used before exercise. Order of medication reviewed with patient. Recommended use of a cool mist humidifier in the bedroom.  problem 3: allergic rhinitis (semi-active) -continue to use Flonase 1 sniff/nostril BID -continue to use Astelin 0.10 1 sniff/nostril BID -continue to use Clarinex 5 mg QHS  - Planning on beginning Allegra OTC QAM - Recommended the [employer]  -Environmental measures for allergies were encouraged including mattress and pillow cover, air purifier, and   problem 3: abnormal chest CT, ? early cancer (improved- ?inflammation)- improved -s/p chest CT in 10/2022; 10/2023, next 11/2024 -Differential diagnosis include: (minimally invasive) cancer, BOOP, or hypersensitivity pneumonitis -recommended thoracic surgical evaluation to determine treatment environmental controls. -CAT scans are the only radiological modality to identify abnormalities within the lungs with regards to nodules/masses/lymph nodes. Risks, benefits were reviewed in detail. The guidelines for abnormalities include follow up CT scans at various intervals which could range from 6 weeks to 1 year intervals. If there is a change for the worse then consideration for a biopsy will be considered if the patient is a candidate. Second opinion evaluation with a thoracic surgeon or an interventional radiologist could be offered.   Problem 3A: ?TBM / ?Bronchomalacia/Laryngomalacia (NOT) -s/p Dynamic CT 4/2022 -Tracheomalacia is usually acquired in adults and common causes include damage by tracheostomy or endotracheal intubation damaging the tracheal cartilage with increase risk with multiple intubations, prolonged intubation, and concurrent high dose steroid therapy; external chest wall trauma and surgery; chronic compression of the trachea by benign etiologies (eg, benign mediastinal goiter) or malignancy; relapsing polychondritis; or recurrent infection. Tracheomalacia can be asymptomatic, however signs or symptoms can develop as the severity of the airway narrowing progresses with major symptoms include dyspnea, cough, and sputum retention. Other symptoms include severe paroxysms of coughing, wheezing or stridor, barking cough and may be exacerbated by forced expiration, cough, and valsalva maneuver. Tracheomalacia is diagnosed by a bronchoscopic visualization of dynamic airway collapse on dynamic chest CT. Therapy is warranted in symptomatic patients with severe tracheomalacia and includes surgical repair as tracheobronchoplasty. The patient was referred to Dr. Rene Le or Dr. Cricket Serna, at [facility] for a surgical consult.   problem 4: GERD -Add Pepcid 40 mg QHS  -Rule of 2s: avoid eating too much, eating too late, eating too spicy, eating two hours before bed -Things to avoid including overeating, spicy foods, tight clothing, eating within three hours of bed, this list is not all inclusive.  -For treatment of reflux, possible options discussed including diet control, H2 blockers, PPIs, as well as coating motility agents discussed as treatment options. Timing of meals and proximity of last meal to sleep were discussed. If symptoms persist, a formal gastrointestinal evaluation is needed.  problem 5: sicca -continue to use Evoxac 30 mg TID  problem 6: Health Maintenance/COVID19 Precautions: -s/p Covid-19 vaccine x3  - Clean your hands often. Wash your hands often with soap and water for at least 20 seconds, especially after blowing your nose, coughing, or sneezing, or having been in a public place. - If soap and water are not available, use a hand  that contains at least 60% alcohol. - To the extent possible, avoid touching high-touch surfaces in public places - elevator buttons, door handles, handrails, handshaking with people, etc. Use a tissue or your sleeve to cover your hand or finger if you must touch something. - Wash your hands after touching surfaces in public places. - Avoid touching your face, nose, eyes, etc. - Clean and disinfect your home to remove germs: practice routine cleaning of frequently touched surfaces (for example: tables, doorknobs, light switches, handles, desks, toilets, faucets, sinks & cell phones) - Avoid crowds, especially in poorly ventilated spaces. Your risk of exposure to respiratory viruses like COVID-19 may increase in crowded, closed-in settings with little air circulation if there are people in the crowd who are sick. All patients are recommended to practice social distancing and stay at least 6 feet away from others. - Avoid all non-essential travel including plane trips, and especially avoid embarking on cruise ships. -If COVID-19 is spreading in your community, take extra measures to put distance between yourself and other people to further reduce your risk of being exposed to this new virus. -Stay home as much as possible. - Consider ways of getting food brought to your house through family, social, or commercial networks -Be aware that the virus has been known to live in the air up to 3 hours post exposure, cardboard up to 24 hours post exposure, copper up to 4 hours post exposure, steel and plastic up to 2-3 days post exposure. Risk of transmission from these surfaces are affected by many variables. Immune Support Recommendations: -OTC Vitamin C 500mg BID  -OTC Quercetin 250-500mg BID  -OTC Zinc 75-100mg per day  -OTC Melatonin 1 or 2 mg a night  -OTC Vitamin D 1-4000mg per day  -OTC Tonic Water 8oz per day Asthma and COVID19: You need to make sure your asthma is under control. This often requires the use of inhaled corticosteroids (and sometimes oral corticosteroids). Inhaled corticosteroids do not likely reduce your immune system's ability to fight infections, but oral corticosteroids may. It is important to use the steps above to protect yourself to limit your exposure to any respiratory virus.   problem 7: health maintenance -back pain- reccomended [facility] mat  -recommended RSV vaccine if over the age of 60  -recommended evaluation with Dr. Trejo for back issue -Recommended Christian Matt "Foundation Stretching"  -recommended yearly flu shot after October 15 s/p 2023 -recommended strep pneumonia vaccines: Prevnar-13 vaccine, followed by Pneumo vaccine 23 one year following (completed)  -recommended early intervention for URIs -recommended regular osteoporosis evaluations -recommended early dermatological evaluations -recommended after the age of 50 to the age of 70, colonoscopy every 5 years   F/P in 4-6 months She is encouraged to call with any changes, concerns, or questions

## 2024-03-12 NOTE — ADDENDUM
[FreeTextEntry1] : Documented by Fortunato Arcos acting as a scribe for Dr. Chance Robertson on 03/12/2024.   All medical record entries made by the Scribe were at my, Dr. Chance Robertson's, direction and personally dictated by me on 03/12/2024. I have reviewed the chart and agree that the record accurately reflects my personal performance of the history, physical exam, assessment and plan. I have also personally directed, reviewed, and agree with the discharge instructions.

## 2024-03-12 NOTE — PHYSICAL EXAM
[No Acute Distress] : no acute distress [Normal Oropharynx] : normal oropharynx [II] : Mallampati Class: II [Normal Appearance] : normal appearance [No Neck Mass] : no neck mass [Normal Rate/Rhythm] : normal rate/rhythm [Normal S1, S2] : normal s1, s2 [No Murmurs] : no murmurs [No Resp Distress] : no resp distress [Clear to Auscultation Bilaterally] : clear to auscultation bilaterally [No Abnormalities] : no abnormalities [Kyphosis] : kyphosis [Benign] : benign [Normal Gait] : normal gait [No Clubbing] : no clubbing [No Cyanosis] : no cyanosis [No Edema] : no edema [FROM] : FROM [Normal Color/ Pigmentation] : normal color/ pigmentation [No Focal Deficits] : no focal deficits [Normal Affect] : normal affect [Oriented x3] : oriented x3 [TextBox_68] : I:E ratio 1:3; clear

## 2024-03-12 NOTE — HISTORY OF PRESENT ILLNESS
[FreeTextEntry1] : Ms. Kenney is a 78-year-old female presenting to the office for a follow-up pulmonary evaluation for abnormal chest CT, reflux, allergic rhinitis, asthma, bronchiectasis, chronic rhinitis, lung nodules and shortness of breath. Her chief complaint is   -She notes neck, back and leg pain -She notes having neuropathic pain -She notes vision is stable -She notes leg swelling -She notes chest congestion recently (2-3 weeks ago) -She notes nasal drip and blowing her nose constantly -She notes hoarseness -She notes being diabetic -she notes her weight is stable -She notes that she is on a Statin(rosuvastatin) -She notes taking Vitamin D -She reports being anemic -She notes using Brio and Flonase at night  -she denies any headaches, nausea, emesis, fever, chills, sweats, chest pain, chest pressure, coughing, wheezing, palpitations, constipation, diarrhea, vertigo, dysphagia, heartburn, reflux, itchy eyes, itchy ears, leg pain, arthralgias, myalgias, or sour taste in the mouth.

## 2024-03-12 NOTE — PROCEDURE
[FreeTextEntry1] : PFT reveals normal flows, with an FEV1 of 2.23 L, which is 133.9% of predicted, with a normal flow volume loop. PFTs were performed to evaluate for SOB.  FENO was 29; a normal value being less than 25 Fractional exhaled nitric oxide (FENO) is regarded as a simple, noninvasive method for assessing eosinophilic airway inflammation. Produced by a variety of cells within the lung, nitric oxide (NO) concentrations are generally low in healthy individuals. However, high concentrations of NO appear to be involved in nonspecific host defense mechanisms and chronic inflammatory diseases such as asthma. The American Thoracic Society (ATS) therefore has strongly recommended using FENO to aid in the assessment, management, and long-term monitoring of eosinophilic airway inflammation and asthma, and for identifying steroid responsive individuals whose chronic respiratory symptoms may be caused by airway inflammation. In their 2011 clinical practice guideline, the ATS emphasizes the importance of using FENO.

## 2024-03-14 ENCOUNTER — APPOINTMENT (OUTPATIENT)
Dept: ORTHOPEDIC SURGERY | Facility: CLINIC | Age: 79
End: 2024-03-14
Payer: MEDICARE

## 2024-03-14 VITALS — WEIGHT: 116 LBS | BODY MASS INDEX: 21.9 KG/M2 | HEIGHT: 61 IN

## 2024-03-14 DIAGNOSIS — M16.0 BILATERAL PRIMARY OSTEOARTHRITIS OF HIP: ICD-10-CM

## 2024-03-14 PROCEDURE — 20611 DRAIN/INJ JOINT/BURSA W/US: CPT | Mod: RT

## 2024-03-14 PROCEDURE — 99213 OFFICE O/P EST LOW 20 MIN: CPT | Mod: 25

## 2024-03-14 NOTE — HISTORY OF PRESENT ILLNESS
[de-identified] : 77-year-old female presents for a bilateral knee follow-up. She is interested in a right knee Monovisc injection today. She denies any swelling or buckling. She states the knee is starting to act up. She states she has rashes on both legs. she states the rashes are burning and red. She had a left knee Monovisc injection on 07/19/2023. She notes she has been doing yoga for her back. She states she exercises constantly.   PMHx of DM controlled by diet, A1c 5.8%.  Radiology Results  o Right Knee : Standing AP, lateral and tunnel views of the knee were obtained and revealed moderate medial and patellofemoral arthritis calcification of the lateral meniscus  o Left Knee : Standing AP, lateral and tunnel views of the knee were obtained and revealed modeate medial and patellofemoral arthritis with calcification of the lateral mensicus   Radiology Results from 2/16/2023: o Pelvis & Left Hip : AP pelvis and lateral LEFT hip were obtained and revealed a fusion from L3 to the sacrum. Mild degenerative arthritis of both hips. Spinal stimulator present.   Radiology Results from 10/31/2022:  o Right Knee : Standing AP, lateral, tunnel, and skyline views of the knee were obtained and reveal moderate medial and patellofemoral arthritis. o Left Knee : Standing AP, lateral, tunnel, and skyline views of the knee were obtained and reveal moderate medial and patellofemoral arthritis.   CT Scan of the Right Hip obtained on 10/21/2021 revealed: 1.  Moderate posterior right hip arthrosis. 2.  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 3.  Moderate right fat-containing inguinal hernia. 4.  Mild tendinosis of the gluteus medius insertion with small calcifications. Mild edema in the right greater trochanteric bursa.

## 2024-03-14 NOTE — PHYSICAL EXAM
[de-identified] : Constitutional o Appearance : well-nourished, well developed, alert, in no acute distress  Head and Face o Head :  Inspection : atraumatic, normocephalic o Face :  Inspection : no visible rash or discoloration Respiratory o Respiratory Effort: breathing unlabored  Neurologic o Mental Status Examination :  Orientation : alert and oriented X 3 Psychiatric o Mood and Affect: mood normal, affect appropriate Cardiovascular o Observation/Palpation : - no swelling Lymphatic o Additional Nodes : No palpable lymph nodes present  Lumbosacral Spine o Inspection : no visible rash or discoloration, well-healed extensive scar o Palpation : no paraspinal musculature tenderness o Range of Motion : limited motion of the LS spine; extension causes left buttock pain, rotation does not cause discomfort  o Muscle Strength : paraspinal muscle strength and tone within normal limits o Muscle Tone : paraspinal muscle strength and tone within normal limits o Tests: straight leg test negative and SARAI test negative bilaterally    Right Lower Extremity o Buttock : no tenderness, swelling or deformities o Right Hip :  Inspection/Palpation : no tenderness, no swelling or deformities  Range of Motion : full flexion and rotation,  no crepitance  Stability : joint stability intact  Strength : extension, flexion 5/5, adduction, abduction 5/5, internal rotation and external rotation  o Right Knee :  Inspection/Palpation : anteromedial compartment tenderness, no lateral compartment tenderness to palpation, no swelling  Range of Motion : 0-120, no crepitance, good patellofemoral glide   Stability : no valgus or varus instability present on provocative testing  Strength : flexion 5/5, extension 5/5  Tests : negative Anterior Drawer, negative Lachman, negative Rikki   Left Lower Extremity o Buttock : sciatic notch tenderness, no swelling or deformities  o Left Hip :  Inspection/Palpation : greater trochanteric and IT band tenderness, no swelling or deformities  Range of Motion : full flexion, pain with rotation, negative pain with log rolling  Stability : joint stability intact  Strength : extension, flexion 5/5, adduction, abduction 4/5 but limited by pain, internal rotation and external rotation  o Left Knee :  Inspection/Palpation : posteromedial joint line tenderness to palpation, no swelling  Range of Motion : 0-140, no crepitance, good patellofemoral glide   Stability : no valgus or varus instability present on provocative testing  Strength : flexion 5/5, extension 5/5  Tests and Signs : all tests for stability normal o Reflexes : patellar tendon reflex 2+, ankle reflex 2+, Babinski sign absent (toes downgoing)    Gait: slight list to the right, no significant extremity swelling or lymphedema, equal leg lengths, no foot drop, reasonable core strength  Radiology Results o Pelvis & Left Hip : AP pelvis and lateral LEFT hip were obtained and revealed a fusion from L3 to the sacrum. Mild degenerative arthritis of both hips. Spinal stimulator present.   Constitutional o Appearance : well-nourished, well developed, alert, in no acute distress  Head and Face o Head :  Inspection : atraumatic, normocephalic o Face :  Inspection : no visible rash or discoloration Respiratory o Respiratory Effort: breathing unlabored  Neurologic o Mental Status Examination :  Orientation : alert and oriented X 3 Psychiatric o Mood and Affect: mood normal, affect appropriate Cardiovascular o Observation/Palpation : - no swelling Lymphatic o Additional Nodes : No palpable lymph nodes present  Lumbosacral Spine o Inspection : no visible rash or discoloration, well-healed incision  o Palpation : no paraspinal musculature tenderness o Range of Motion : extension does not cause discomfort, sidebending is very limited, rotation to the left is more limited than to the right  o Muscle Strength : paraspinal muscle strength and tone within normal limits o Muscle Tone : paraspinal muscle strength and tone within normal limits o Tests: straight leg test negative and SARAI test negative bilaterally   Right Lower Extremity o Buttock : no tenderness, swelling or deformities  o Right Hip :  Inspection/Palpation : no tenderness, swelling or deformities  Range of Motion : full and painless in all planes, no crepitance  Stability : joint stability intact  Strength : extension, flexion, adduction, abduction, internal rotation and external rotation, 5/5  o Right Knee :  Inspection/Palpation : medial / mild medial  compartment tenderness and tenderness over the medial femoral condyle, to palpation, no swelling  Range of Motion : 5-130 active flexion and extension full and painless, no crepitance,  decreased patellofemoral glide   Stability : no valgus or varus instability present on provocative testing  Strength : flexion and extension 5/5  Tests and Signs : negative Anterior Drawer, negative Lachman, negative Rikki  Left Lower Extremity o Buttock : no tenderness, swelling or deformities  o Left Hip :  Inspection/Palpation : no tenderness, no swelling or deformities  Range of Motion : full and painless in all planes, no crepitance  Stability : joint stability intact  Strength : extension, flexion, adduction, abduction, internal rotation and external rotation, 5/5  o Left Knee :  Inspection/Palpation : no tenderness to palpation, no swelling  Range of Motion :0-140active flexion and extension full and painless, no crepitance decreased patellofemoral glide   Stability : no valgus or varus instability present on provocative testing  Strength : flexion and extension 5/5  Tests and Signs : negative Anterior Drawer, negative Lachman, negative Rikki  Gait and Station: Gait: good core strength no significant extremity swelling or lymphedema, good proprioception and balance,   o Knee injection : Indication- right knee osteoarthritis, Anatomic location- right intra-articular joint space, Spray - area was sterilized with Betadine and alcohol and anesthetized with Ethyl Chloride , needle used-20G, Medications given- 4cc's Monovisc under Ultrasound guidance. The patient tolerated the procedure well. Lot#[id number] Exp#[id number]

## 2024-03-14 NOTE — ADDENDUM
[FreeTextEntry1] : I, ANGIE MTZ, acted solely as a scribe for Dr. Heriberto Buenrostro on this date 03/14/2024.  All medical record entries made by the Scribe were at my, Dr. Heriberto Buenrostro, direction and personally dictated by me on 03/14/2024. I have reviewed the chart and agree that the record accurately reflects my personal performance of the history, physical exam, assessment and plan. I have also personally directed, reviewed, and agreed with the chart.

## 2024-03-14 NOTE — DISCUSSION/SUMMARY
[de-identified] : I went over the pathophysiology of the patient's symptoms in great detail with the patient. The patient elected to receive a Monovisc injection into her right knee today, and tolerated it well. I instructed the patient on ROM exercises, and told them to take it easy. The use of ice and rest was reviewed with the patient. The patient may resume activities tomorrow. I reminded the patient that it takes 4 to 6 weeks after the final injection to feel symptom relief.  All of their questions were answered. They understand and consent to the plan.   FU in a couple weeks for the left knee gel.

## 2024-03-22 ENCOUNTER — APPOINTMENT (OUTPATIENT)
Dept: PAIN MANAGEMENT | Facility: CLINIC | Age: 79
End: 2024-03-22
Payer: MEDICARE

## 2024-03-22 DIAGNOSIS — G89.29 DORSALGIA, UNSPECIFIED: ICD-10-CM

## 2024-03-22 DIAGNOSIS — M54.9 DORSALGIA, UNSPECIFIED: ICD-10-CM

## 2024-03-22 PROCEDURE — 99213 OFFICE O/P EST LOW 20 MIN: CPT

## 2024-03-22 NOTE — HISTORY OF PRESENT ILLNESS
[Lower back] : lower back [Gradual] : gradual [9] : 9 [6] : 6 [Burning] : burning [Stabbing] : stabbing [Sharp] : sharp [Throbbing] : throbbing [Tightness] : tightness [Constant] : constant [Household chores] : household chores [Work] : work [Sleep] : sleep [Meds] : meds [Rest] : rest [Ice] : ice [Heat] : heat [Sitting] : sitting [Walking] : walking [Retired] : Work status: retired [FreeTextEntry1] : States. her low back and.bilateral leg pain has increased. Notes her left sciatica is severe. at. times. She has a SCS and finds helpful and I suggested she contact [employer] and maybe. a. reprogramming may help her.  Maintains a functional ADL. She would like to. be more active . Meds effective.  [] : no [FreeTextEntry9] : [employer] [facility]  [de-identified] : 2023-05-12 [de-identified] : HOME EXERCISES DAILY 5-7X WEEK WALKING , STRETCHING, LEG LIFTS, AND UPPER BODY WORKOUTS

## 2024-03-22 NOTE — DISCUSSION/SUMMARY
[Medication Risks Reviewed] : Medication risks reviewed [de-identified] : Prescriptions renewed. Opioid agreement/obtained on chart [place]  reviewed and appropriate. SOAPP-R completed on chart. The patient's medications are documented to the best of their ability. Quality of life and functional ability improved on medications. The patient is showing no aberrant behavior or evidence of diversion. The patient was advised not to use narcotic medication while operating an automobile or heavy machinery due to potential sedation or dizziness. The patient was educated to the risks associated with potential opioid dependence and addiction. Urine toxicology screens as per office protocol. Use of multimodal analgesia used prn. She will contact [employer] and arrange an office visit.  Follow up one month.

## 2024-03-22 NOTE — REASON FOR VISIT
[Follow-Up Visit] : a follow-up pain management visit [Home] : at home, [unfilled] , at the time of the visit. [Medical Office: ([facility])___] : at the medical office located in  [Patient] : the patient [Self] : self [FreeTextEntry2] : MED REFILL

## 2024-04-15 ENCOUNTER — APPOINTMENT (OUTPATIENT)
Dept: ORTHOPEDIC SURGERY | Facility: CLINIC | Age: 79
End: 2024-04-15
Payer: MEDICARE

## 2024-04-15 DIAGNOSIS — M70.62 TROCHANTERIC BURSITIS, RIGHT HIP: ICD-10-CM

## 2024-04-15 DIAGNOSIS — M70.61 TROCHANTERIC BURSITIS, RIGHT HIP: ICD-10-CM

## 2024-04-15 PROCEDURE — 99213 OFFICE O/P EST LOW 20 MIN: CPT

## 2024-04-15 NOTE — PHYSICAL EXAM
[de-identified] : Constitutional o Appearance : well-nourished, well developed, alert, in no acute distress  Head and Face o Head :  Inspection : atraumatic, normocephalic o Face :  Inspection : no visible rash or discoloration Respiratory o Respiratory Effort: breathing unlabored  Neurologic o Mental Status Examination :  Orientation : alert and oriented X 3 Psychiatric o Mood and Affect: mood normal, affect appropriate Cardiovascular o Observation/Palpation : - no swelling Lymphatic o Additional Nodes : No palpable lymph nodes present  Lumbosacral Spine o Inspection : no visible rash or discoloration, well-healed extensive scar o Palpation : no paraspinal musculature tenderness o Range of Motion : limited motion of the LS spine; extension causes left buttock pain, rotation does not cause discomfort  o Muscle Strength : paraspinal muscle strength and tone within normal limits o Muscle Tone : paraspinal muscle strength and tone within normal limits o Tests: straight leg test negative and SARAI test negative bilaterally    Right Lower Extremity o Buttock : no tenderness, swelling or deformities o Right Hip :  Inspection/Palpation : no tenderness, no swelling or deformities  Range of Motion : full flexion and rotation,  no crepitance  Stability : joint stability intact  Strength : extension, flexion 5/5, adduction, abduction 5/5, internal rotation and external rotation  o Right Knee :  Inspection/Palpation : anteromedial compartment tenderness, no lateral compartment tenderness to palpation, no swelling  Range of Motion : 0-120, no crepitance, good patellofemoral glide   Stability : no valgus or varus instability present on provocative testing  Strength : flexion 5/5, extension 5/5  Tests : negative Anterior Drawer, negative Lachman, negative Rikki   Left Lower Extremity o Buttock : sciatic notch tenderness, no swelling or deformities  o Left Hip :  Inspection/Palpation : greater trochanteric and IT band tenderness, no swelling or deformities  Range of Motion : full flexion, pain with rotation, negative pain with log rolling  Stability : joint stability intact  Strength : extension, flexion 5/5, adduction, abduction 4/5 but limited by pain, internal rotation and external rotation  o Left Knee :  Inspection/Palpation : posteromedial joint line tenderness to palpation, no swelling  Range of Motion : 0-140, no crepitance, good patellofemoral glide   Stability : no valgus or varus instability present on provocative testing  Strength : flexion 5/5, extension 5/5  Tests and Signs : all tests for stability normal o Reflexes : patellar tendon reflex 2+, ankle reflex 2+, Babinski sign absent (toes downgoing)    Gait: slight list to the right, no significant extremity swelling or lymphedema, equal leg lengths, no foot drop, reasonable core strength  Radiology Results o Pelvis & Left Hip : AP pelvis and lateral LEFT hip were obtained and revealed a fusion from L3 to the sacrum. Mild degenerative arthritis of both hips. Spinal stimulator present.   Constitutional o Appearance : well-nourished, well developed, alert, in no acute distress  Head and Face o Head :  Inspection : atraumatic, normocephalic o Face :  Inspection : no visible rash or discoloration Respiratory o Respiratory Effort: breathing unlabored  Neurologic o Mental Status Examination :  Orientation : alert and oriented X 3 Psychiatric o Mood and Affect: mood normal, affect appropriate Cardiovascular o Observation/Palpation : - no swelling Lymphatic o Additional Nodes : No palpable lymph nodes present  Lumbosacral Spine o Inspection : no visible rash or discoloration, well-healed incision  o Palpation : no paraspinal musculature tenderness o Range of Motion : extension does not cause discomfort, sidebending is very limited, rotation to the left is more limited than to the right  o Muscle Strength : paraspinal muscle strength and tone within normal limits o Muscle Tone : paraspinal muscle strength and tone within normal limits o Tests: straight leg test negative and SARAI test negative bilaterally   Right Lower Extremity o Buttock : no tenderness, swelling or deformities  o Right Hip :  Inspection/Palpation : no tenderness, swelling or deformities  Range of Motion : full and painless in all planes, no crepitance  Stability : joint stability intact  Strength : extension, flexion, adduction, abduction, internal rotation and external rotation, 5/5  o Right Knee :  Inspection/Palpation : no medial / mild medial  compartment tenderness and tenderness over the medial femoral condyle, to palpation, no swelling  Range of Motion : 0-130 active flexion and extension full and painless, no crepitance,  decreased patellofemoral glide   Stability : no valgus or varus instability present on provocative testing  Strength : flexion and extension 5/5  Tests and Signs : negative Anterior Drawer, negative Lachman, negative Rikki  Left Lower Extremity o Buttock : no tenderness, swelling or deformities  o Left Hip :  Inspection/Palpation : no tenderness, no swelling or deformities  Range of Motion : full and painless in all planes, no crepitance  Stability : joint stability intact  Strength : extension, flexion, adduction, abduction, internal rotation and external rotation, 5/5  o Left Knee :  Inspection/Palpation : no tenderness to palpation, no swelling,   Range of Motion :0-135 active flexion and extension full and painless, no crepitance decreased patellofemoral glide   Stability : no valgus or varus instability present on provocative testing  Strength : flexion and extension 5/5  Tests and Signs : negative Anterior Drawer, negative Lachman, negative Rikki  Gait and Station: Gait: tight hamstrings, limited core strength no significant extremity swelling or lymphedema, good proprioception and balance,

## 2024-04-15 NOTE — ADDENDUM
[FreeTextEntry1] : I, ANGIE MTZ, acted solely as a scribe for Dr. Heriebrto Buenrostro on this date 04/15/2024.  All medical record entries made by the Scribe were at my, Dr. Heriberto Buenrostro, direction and personally dictated by me on 04/15/2024. I have reviewed the chart and agree that the record accurately reflects my personal performance of the history, physical exam, assessment and plan. I have also personally directed, reviewed, and agreed with the chart.

## 2024-04-15 NOTE — DISCUSSION/SUMMARY
[de-identified] : I went over the pathophysiology of the patient's symptoms in great detail with the patient. I informed the patient they are due for a repeat gel injection any time after she returns from [place]. She should continue to do at-home strengthening exercises were discussed and demonstrated with the patient.   All of their questions were answered. They understand and consent to the plan.   FU when she returns from [place] for a left knee gel.

## 2024-04-15 NOTE — HISTORY OF PRESENT ILLNESS
[de-identified] : 77-year-old female presents for a bilateral knee follow-up. She had a right knee Monovisc injection on 3/14/62854. She denies any swelling or buckling. She states the knee is starting to act up. She is due for a left knee gel injection. She states she would like to get a left knee gel injection when she returns from her granddaughters graduation. She denies any swelling or buckling. She states her left knee pain is mostly posteriorly. She states she exercises daily.  PMHx of DM controlled by diet, A1c 5.8%.  Radiology Results  o Right Knee : Standing AP, lateral and tunnel views of the knee were obtained and revealed moderate medial and patellofemoral arthritis calcification of the lateral meniscus  o Left Knee : Standing AP, lateral and tunnel views of the knee were obtained and revealed modeate medial and patellofemoral arthritis with calcification of the lateral mensicus   Radiology Results from 2/16/2023: o Pelvis & Left Hip : AP pelvis and lateral LEFT hip were obtained and revealed a fusion from L3 to the sacrum. Mild degenerative arthritis of both hips. Spinal stimulator present.   Radiology Results from 10/31/2022:  o Right Knee : Standing AP, lateral, tunnel, and skyline views of the knee were obtained and reveal moderate medial and patellofemoral arthritis. o Left Knee : Standing AP, lateral, tunnel, and skyline views of the knee were obtained and reveal moderate medial and patellofemoral arthritis.   CT Scan of the Right Hip obtained on 10/21/2021 revealed: 1.  Moderate posterior right hip arthrosis. 2.  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 3.  Moderate right fat-containing inguinal hernia. 4.  Mild tendinosis of the gluteus medius insertion with small calcifications. Mild edema in the right greater trochanteric bursa.

## 2024-04-22 ENCOUNTER — APPOINTMENT (OUTPATIENT)
Dept: PAIN MANAGEMENT | Facility: CLINIC | Age: 79
End: 2024-04-22
Payer: MEDICARE

## 2024-04-22 PROCEDURE — 99213 OFFICE O/P EST LOW 20 MIN: CPT

## 2024-04-22 NOTE — HISTORY OF PRESENT ILLNESS
[Lower back] : lower back [Gradual] : gradual [7] : 7 [6] : 6 [Burning] : burning [Sharp] : sharp [Stabbing] : stabbing [Throbbing] : throbbing [Tightness] : tightness [Constant] : constant [Household chores] : household chores [Work] : work [Sleep] : sleep [Rest] : rest [Meds] : meds [Ice] : ice [Heat] : heat [Sitting] : sitting [Walking] : walking [Retired] : Work status: retired [Home] : at home, [unfilled] , at the time of the visit. [Medical Office: ([facility])___] : at the medical office located in  [Verbal consent obtained from patient] : the patient, [unfilled] [FreeTextEntry1] : BANDAR RODRÍGUEZ IS FOLLOWING UP FOR PAIN MED REFILL, THERE HAS NOT BEEN CHANGES IN PAIN SINCE LAST VISIT.   LAST UDS: 01/24/2024    [] : This patient has had an injection before: no [FreeTextEntry9] : [employer] [facility]  [de-identified] : 2023-05-12 [de-identified] : HOME EXERCISES DAILY 5-7X WEEK WALKING , STRETCHING, LEG LIFTS, AND UPPER BODY WORKOUTS

## 2024-05-07 ENCOUNTER — APPOINTMENT (OUTPATIENT)
Dept: ORTHOPEDIC SURGERY | Facility: CLINIC | Age: 79
End: 2024-05-07

## 2024-05-14 ENCOUNTER — APPOINTMENT (OUTPATIENT)
Dept: ORTHOPEDIC SURGERY | Facility: CLINIC | Age: 79
End: 2024-05-14

## 2024-05-14 ENCOUNTER — APPOINTMENT (OUTPATIENT)
Dept: ORTHOPEDIC SURGERY | Facility: CLINIC | Age: 79
End: 2024-05-14
Payer: MEDICARE

## 2024-05-14 DIAGNOSIS — M17.0 BILATERAL PRIMARY OSTEOARTHRITIS OF KNEE: ICD-10-CM

## 2024-05-14 PROCEDURE — 99213 OFFICE O/P EST LOW 20 MIN: CPT | Mod: 25

## 2024-05-14 PROCEDURE — 20611 DRAIN/INJ JOINT/BURSA W/US: CPT | Mod: LT

## 2024-05-14 NOTE — PHYSICAL EXAM
[de-identified] : Constitutional o Appearance : well-nourished, well developed, alert, in no acute distress  Head and Face o Head :  Inspection : atraumatic, normocephalic o Face :  Inspection : no visible rash or discoloration Respiratory o Respiratory Effort: breathing unlabored  Neurologic o Mental Status Examination :  Orientation : alert and oriented X 3 Psychiatric o Mood and Affect: mood normal, affect appropriate Cardiovascular o Observation/Palpation : - no swelling Lymphatic o Additional Nodes : No palpable lymph nodes present  Lumbosacral Spine o Inspection : no visible rash or discoloration, well-healed extensive scar o Palpation : no paraspinal musculature tenderness o Range of Motion : limited motion of the LS spine; extension causes left buttock pain, rotation does not cause discomfort  o Muscle Strength : paraspinal muscle strength and tone within normal limits o Muscle Tone : paraspinal muscle strength and tone within normal limits o Tests: straight leg test negative and SARAI test negative bilaterally    Right Lower Extremity o Buttock : no tenderness, swelling or deformities o Right Hip :  Inspection/Palpation : no tenderness, no swelling or deformities  Range of Motion : full flexion and rotation,  no crepitance  Stability : joint stability intact  Strength : extension, flexion 5/5, adduction, abduction 5/5, internal rotation and external rotation  o Right Knee :  Inspection/Palpation : anteromedial compartment tenderness, no lateral compartment tenderness to palpation, no swelling  Range of Motion : 0-120, no crepitance, good patellofemoral glide   Stability : no valgus or varus instability present on provocative testing  Strength : flexion 5/5, extension 5/5  Tests : negative Anterior Drawer, negative Lachman, negative Rikki   Left Lower Extremity o Buttock : sciatic notch tenderness, no swelling or deformities  o Left Hip :  Inspection/Palpation : greater trochanteric and IT band tenderness, no swelling or deformities  Range of Motion : full flexion, pain with rotation, negative pain with log rolling  Stability : joint stability intact  Strength : extension, flexion 5/5, adduction, abduction 4/5 but limited by pain, internal rotation and external rotation  o Left Knee :  Inspection/Palpation : posteromedial joint line tenderness to palpation, no swelling  Range of Motion : 0-140, no crepitance, good patellofemoral glide   Stability : no valgus or varus instability present on provocative testing  Strength : flexion 5/5, extension 5/5  Tests and Signs : all tests for stability normal o Reflexes : patellar tendon reflex 2+, ankle reflex 2+, Babinski sign absent (toes downgoing)    Gait: slight list to the right, no significant extremity swelling or lymphedema, equal leg lengths, no foot drop, reasonable core strength  Radiology Results o Pelvis & Left Hip : AP pelvis and lateral LEFT hip were obtained and revealed a fusion from L3 to the sacrum. Mild degenerative arthritis of both hips. Spinal stimulator present.   Constitutional o Appearance : well-nourished, well developed, alert, in no acute distress  Head and Face o Head :  Inspection : atraumatic, normocephalic o Face :  Inspection : no visible rash or discoloration Respiratory o Respiratory Effort: breathing unlabored  Neurologic o Mental Status Examination :  Orientation : alert and oriented X 3 Psychiatric o Mood and Affect: mood normal, affect appropriate Cardiovascular o Observation/Palpation : - no swelling Lymphatic o Additional Nodes : No palpable lymph nodes present  Lumbosacral Spine o Inspection : no visible rash or discoloration, well-healed incision  o Palpation : no paraspinal musculature tenderness o Range of Motion : extension does not cause discomfort, sidebending is very limited, rotation to the left is more limited than to the right  o Muscle Strength : paraspinal muscle strength and tone within normal limits o Muscle Tone : paraspinal muscle strength and tone within normal limits o Tests: straight leg test negative and SARAI test negative bilaterally   Right Lower Extremity o Buttock : no tenderness, swelling or deformities  o Right Hip :  Inspection/Palpation : no tenderness, swelling or deformities  Range of Motion : full and painless in all planes, no crepitance  Stability : joint stability intact  Strength : extension, flexion, adduction, abduction, internal rotation and external rotation, 5/5  o Right Knee :  Inspection/Palpation : no medial / mild medial  compartment tenderness and tenderness over the medial femoral condyle, to palpation, no swelling  Range of Motion : 0-130 active flexion and extension full and painless, no crepitance,  decreased patellofemoral glide   Stability : no valgus or varus instability present on provocative testing  Strength : flexion and extension 5/5  Tests and Signs : negative Anterior Drawer, negative Lachman, negative Rikki  Left Lower Extremity o Buttock : no tenderness, swelling or deformities  o Left Hip :  Inspection/Palpation : no tenderness, no swelling or deformities  Range of Motion : full and painless in all planes, no crepitance  Stability : joint stability intact  Strength : extension, flexion, adduction, abduction, internal rotation and external rotation, 5/5  o Left Knee :  Inspection/Palpation : medial compartment tenderness no lateral compartment tenderness to palpation, no swelling,   Range of Motion :0-135 active flexion and extension full and painless, no crepitance decreased patellofemoral glide   Stability : no valgus or varus instability present on provocative testing  Strength : flexion and extension 5/5  Tests and Signs : negative Anterior Drawer, negative Lachman, negative Rikki  Gait and Station: Gait: tight hamstrings, limited core strength no significant extremity swelling or lymphedema, good proprioception and balance,   o Knee injection : Indication- left knee osteoarthritis, Anatomic location- left intra-articular joint space, Spray - area was sterilized with Betadine and alcohol and anesthetized with Ethyl Chloride , needle used-20G, Medications given- 4cc's Monovisc under Ultrasound guidance. The patient tolerated the procedure well. lot#[id number] Exp#[id number]

## 2024-05-14 NOTE — DISCUSSION/SUMMARY
[de-identified] : I went over the pathophysiology of the patient's symptoms in great detail with the patient. The patient elected to receive a Monovisv injection into her left knee today, and tolerated it well. I instructed the patient on ROM exercises, and told them to take it easy. The use of ice and rest was reviewed with the patient. The patient may resume activities tomorrow. I reminded the patient that it takes 4 to 6 weeks after the final injection to feel symptom relief.   All of their questions were answered. They understand and consent to the plan.   FU in 6 weeks.

## 2024-05-14 NOTE — ADDENDUM
[FreeTextEntry1] : I, ANGIE MTZ, acted solely as a scribe for Dr. Heriberto Buenrostro on this date 05/14/2024.  All medical record entries made by the Scribe were at my, Dr. Heriberto Buenrostro, direction and personally dictated by me on 05/14/2024. I have reviewed the chart and agree that the record accurately reflects my personal performance of the history, physical exam, assessment and plan. I have also personally directed, reviewed, and agreed with the chart.

## 2024-05-14 NOTE — HISTORY OF PRESENT ILLNESS
[de-identified] : 77-year-old female presents for a bilateral knee follow-up. She is interested in a left knee Monovisc injection today 5/14/2024. She had a right knee Monovisc injection on 3/14/29724. She denies any swelling or buckling. she feels like her legs are stronger.  PMHx of DM controlled by diet, A1c 5.8%.  Radiology Results 10/04/2023 o Right Knee : Standing AP, lateral and tunnel views of the knee were obtained and revealed moderate medial and patellofemoral arthritis calcification of the lateral meniscus  o Left Knee : Standing AP, lateral and tunnel views of the knee were obtained and revealed moderate medial and patellofemoral arthritis with calcification of the lateral mensicus   Radiology Results from 2/16/2023: o Pelvis & Left Hip : AP pelvis and lateral LEFT hip were obtained and revealed a fusion from L3 to the sacrum. Mild degenerative arthritis of both hips. Spinal stimulator present.   Radiology Results from 10/31/2022:  o Right Knee : Standing AP, lateral, tunnel, and skyline views of the knee were obtained and reveal moderate medial and patellofemoral arthritis. o Left Knee : Standing AP, lateral, tunnel, and skyline views of the knee were obtained and reveal moderate medial and patellofemoral arthritis.   CT Scan of the Right Hip obtained on 10/21/2021 revealed: 1.  Moderate posterior right hip arthrosis. 2.  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 3.  Moderate right fat-containing inguinal hernia. 4.  Mild tendinosis of the gluteus medius insertion with small calcifications. Mild edema in the right greater trochanteric bursa.

## 2024-05-20 ENCOUNTER — APPOINTMENT (OUTPATIENT)
Dept: PAIN MANAGEMENT | Facility: CLINIC | Age: 79
End: 2024-05-20
Payer: MEDICARE

## 2024-05-20 PROCEDURE — 99213 OFFICE O/P EST LOW 20 MIN: CPT

## 2024-05-20 RX ORDER — METHYLPREDNISOLONE 4 MG/1
4 TABLET ORAL
Qty: 1 | Refills: 0 | Status: ACTIVE | COMMUNITY
Start: 2024-05-20 | End: 1900-01-01

## 2024-05-20 NOTE — HISTORY OF PRESENT ILLNESS
[Lower back] : lower back [Gradual] : gradual [7] : 7 [6] : 6 [Burning] : burning [Sharp] : sharp [Stabbing] : stabbing [Throbbing] : throbbing [Tightness] : tightness [Constant] : constant [Household chores] : household chores [Work] : work [Sleep] : sleep [Rest] : rest [Meds] : meds [Ice] : ice [Heat] : heat [Sitting] : sitting [Walking] : walking [Retired] : Work status: retired [Home] : at home, [unfilled] , at the time of the visit. [Medical Office: ([facility])___] : at the medical office located in  [Verbal consent obtained from patient] : the patient, [unfilled] [] : This patient has had an injection before: no [FreeTextEntry9] : [employer] [facility]  [de-identified] : 2023-05-12 [de-identified] : HOME EXERCISES DAILY 5-7X WEEK WALKING , STRETCHING, LEG LIFTS, AND UPPER BODY WORKOUTS

## 2024-06-19 ENCOUNTER — APPOINTMENT (OUTPATIENT)
Dept: PAIN MANAGEMENT | Facility: CLINIC | Age: 79
End: 2024-06-19
Payer: MEDICARE

## 2024-06-19 DIAGNOSIS — G03.9 MENINGITIS, UNSPECIFIED: ICD-10-CM

## 2024-06-19 PROCEDURE — 99213 OFFICE O/P EST LOW 20 MIN: CPT

## 2024-06-19 RX ORDER — MORPHINE SULFATE 30 MG/1
30 TABLET, FILM COATED, EXTENDED RELEASE ORAL DAILY
Qty: 30 | Refills: 0 | Status: ACTIVE | COMMUNITY
Start: 1900-01-01 | End: 1900-01-01

## 2024-06-19 NOTE — HISTORY OF PRESENT ILLNESS
[Lower back] : lower back [Gradual] : gradual [7] : 7 [6] : 6 [Burning] : burning [Sharp] : sharp [Stabbing] : stabbing [Throbbing] : throbbing [Tightness] : tightness [Constant] : constant [Household chores] : household chores [Work] : work [Sleep] : sleep [Rest] : rest [Meds] : meds [Ice] : ice [Heat] : heat [Sitting] : sitting [Walking] : walking [Retired] : Work status: retired [FreeTextEntry1] : BANDAR RODRÍGUEZ  IS FOLLOWING UP FOR PAIN MED REFILL, THERE HAS NOT BEEN CHANGES IN PAIN SINCE LAST VISIT.   LAST UDS: 6/19/2024  [] : This patient has had an injection before: no [FreeTextEntry9] : [employer] [facility]  [de-identified] : 2023-05-12 [de-identified] : HOME EXERCISES DAILY 5-7X WEEK WALKING , STRETCHING, LEG LIFTS, AND UPPER BODY WORKOUTS

## 2024-06-19 NOTE — REASON FOR VISIT
[Follow-Up Visit] : a follow-up pain management visit Tremfya Counseling: I discussed with the patient the risks of guselkumab including but not limited to immunosuppression, serious infections, worsening of inflammatory bowel disease and drug reactions.  The patient understands that monitoring is required including a PPD at baseline and must alert us or the primary physician if symptoms of infection or other concerning signs are noted.

## 2024-06-25 ENCOUNTER — APPOINTMENT (OUTPATIENT)
Dept: ORTHOPEDIC SURGERY | Facility: CLINIC | Age: 79
End: 2024-06-25

## 2024-07-19 ENCOUNTER — APPOINTMENT (OUTPATIENT)
Dept: PAIN MANAGEMENT | Facility: CLINIC | Age: 79
End: 2024-07-19
Payer: MEDICARE

## 2024-07-19 DIAGNOSIS — G03.9 MENINGITIS, UNSPECIFIED: ICD-10-CM

## 2024-07-19 PROCEDURE — 99213 OFFICE O/P EST LOW 20 MIN: CPT

## 2024-08-06 ENCOUNTER — APPOINTMENT (OUTPATIENT)
Dept: ORTHOPEDIC SURGERY | Facility: CLINIC | Age: 79
End: 2024-08-06

## 2024-08-13 ENCOUNTER — APPOINTMENT (OUTPATIENT)
Dept: ORTHOPEDIC SURGERY | Facility: CLINIC | Age: 79
End: 2024-08-13
Payer: MEDICARE

## 2024-08-13 VITALS — WEIGHT: 116 LBS | BODY MASS INDEX: 21.9 KG/M2 | HEIGHT: 61 IN

## 2024-08-13 DIAGNOSIS — M17.0 BILATERAL PRIMARY OSTEOARTHRITIS OF KNEE: ICD-10-CM

## 2024-08-13 PROCEDURE — 99213 OFFICE O/P EST LOW 20 MIN: CPT

## 2024-08-13 NOTE — HISTORY OF PRESENT ILLNESS
[de-identified] : 77-year-old female presents for a bilateral knee follow-up. She had a left knee Monovisc injection on 5/14/2024. She had a right knee Monovisc injection on 3/14/11825. She denies any swelling or buckling. She notes she gets occasional left knee pain behind her knee. She notes the gel injections are working and providing relief.  PMHx of DM controlled by diet, A1c 5.6%.  Radiology Results 10/04/2023 o Right Knee : Standing AP, lateral and tunnel views of the knee were obtained and revealed moderate medial and patellofemoral arthritis calcification of the lateral meniscus  o Left Knee : Standing AP, lateral and tunnel views of the knee were obtained and revealed moderate medial and patellofemoral arthritis with calcification of the lateral mensicus   Radiology Results from 2/16/2023: o Pelvis & Left Hip : AP pelvis and lateral LEFT hip were obtained and revealed a fusion from L3 to the sacrum. Mild degenerative arthritis of both hips. Spinal stimulator present.   Radiology Results from 10/31/2022:  o Right Knee : Standing AP, lateral, tunnel, and skyline views of the knee were obtained and reveal moderate medial and patellofemoral arthritis. o Left Knee : Standing AP, lateral, tunnel, and skyline views of the knee were obtained and reveal moderate medial and patellofemoral arthritis.   CT Scan of the Right Hip obtained on 10/21/2021 revealed: 1.  Moderate posterior right hip arthrosis. 2.  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 3.  Moderate right fat-containing inguinal hernia. 4.  Mild tendinosis of the gluteus medius insertion with small calcifications. Mild edema in the right greater trochanteric bursa.

## 2024-08-13 NOTE — DISCUSSION/SUMMARY
[de-identified] : I went over the pathophysiology of the patient's symptoms in great detail with the patient. She needs to avoid high-impact activities such as running and jumping or riding a treadmill. I recommend alternative activities such as riding a stationary bike or elliptical on low tension. She should focus on light weight and high repetition exercises. She should avoid squatting and kneeling. We discussed the use of ice, Tylenol and anti-inflammatories to relieve pain.   All of their questions were answered. They understand and consent to the plan.   FU after 9/14/2024 for right knee gel injection.

## 2024-08-13 NOTE — ADDENDUM
[FreeTextEntry1] : I, ANGIE MTZ, acted solely as a scribe for Dr. Heriberto Buenrostro on this date 08/13/2024.  All medical record entries made by the Scribe were at my, Dr. Heriberto Buenrostro, direction and personally dictated by me on 08/13/2024. I have reviewed the chart and agree that the record accurately reflects my personal performance of the history, physical exam, assessment and plan. I have also personally directed, reviewed, and agreed with the chart.

## 2024-08-16 ENCOUNTER — APPOINTMENT (OUTPATIENT)
Dept: PAIN MANAGEMENT | Facility: CLINIC | Age: 79
End: 2024-08-16
Payer: MEDICARE

## 2024-08-16 DIAGNOSIS — G03.9 MENINGITIS, UNSPECIFIED: ICD-10-CM

## 2024-08-16 PROCEDURE — 99213 OFFICE O/P EST LOW 20 MIN: CPT

## 2024-08-16 NOTE — HISTORY OF PRESENT ILLNESS
[Lower back] : lower back [Gradual] : gradual [7] : 7 [6] : 6 [Burning] : burning [Sharp] : sharp [Stabbing] : stabbing [Throbbing] : throbbing [Tightness] : tightness [Constant] : constant [Household chores] : household chores [Work] : work [Sleep] : sleep [Rest] : rest [Meds] : meds [Ice] : ice [Heat] : heat [Sitting] : sitting [Walking] : walking [Retired] : Work status: retired [Home] : at home, [unfilled] , at the time of the visit. [Medical Office: ([facility])___] : at the medical office located in  [Verbal consent obtained from patient] : the patient, [unfilled] [FreeTextEntry1] : BANDAR RODRÍGUEZ IS FOLLOWING UP FOR PAIN MED REFILL, THERE HAS NOT BEEN CHANGES IN PAIN SINCE LAST VISIT.   LAST UDS: 01/24/2024    [] : This patient has had an injection before: no [FreeTextEntry9] : [employer]  [de-identified] : 2023-05-12 [de-identified] : HOME EXERCISES DAILY 5-7X WEEK WALKING , STRETCHING, LEG LIFTS, AND UPPER BODY WORKOUTS

## 2024-08-16 NOTE — H&P PST ADULT - PRO PAIN LIFE ADAPT
Quality 226: Preventive Care And Screening: Tobacco Use: Screening And Cessation Intervention: Patient screened for tobacco use and is an ex/non-smoker
Detail Level: Detailed
Quality 47: Advance Care Plan: Advance Care Planning discussed and documented; advance care plan or surrogate decision maker documented in the medical record.
decreased activity level

## 2024-09-12 ENCOUNTER — APPOINTMENT (OUTPATIENT)
Dept: PULMONOLOGY | Facility: CLINIC | Age: 79
End: 2024-09-12

## 2024-09-16 ENCOUNTER — NON-APPOINTMENT (OUTPATIENT)
Age: 79
End: 2024-09-16

## 2024-09-16 ENCOUNTER — APPOINTMENT (OUTPATIENT)
Dept: PAIN MANAGEMENT | Facility: CLINIC | Age: 79
End: 2024-09-16
Payer: MEDICARE

## 2024-09-16 DIAGNOSIS — G03.9 MENINGITIS, UNSPECIFIED: ICD-10-CM

## 2024-09-16 PROCEDURE — 99213 OFFICE O/P EST LOW 20 MIN: CPT

## 2024-09-16 NOTE — HISTORY OF PRESENT ILLNESS
[Lower back] : lower back [Gradual] : gradual [7] : 7 [6] : 6 [Burning] : burning [Sharp] : sharp [Stabbing] : stabbing [Throbbing] : throbbing [Tightness] : tightness [Constant] : constant [Household chores] : household chores [Work] : work [Sleep] : sleep [Rest] : rest [Meds] : meds [Ice] : ice [Heat] : heat [Sitting] : sitting [Walking] : walking [Retired] : Work status: retired [Home] : at home, [unfilled] , at the time of the visit. [Medical Office: ([facility])___] : at the medical office located in  [Verbal consent obtained from patient] : the patient, [unfilled] [FreeTextEntry1] : BANDAR RODRÍGUEZ IS FOLLOWING UP FOR PAIN MED REFILL, THERE HAS NOT BEEN CHANGES IN PAIN SINCE LAST VISIT.   LAST UDS: 01/24/2024    [] : This patient has had an injection before: no [FreeTextEntry9] : [employer]  [de-identified] : 2023-05-12 [de-identified] : HOME EXERCISES DAILY 5-7X WEEK WALKING , STRETCHING, LEG LIFTS, AND UPPER BODY WORKOUTS

## 2024-10-10 ENCOUNTER — APPOINTMENT (OUTPATIENT)
Dept: ORTHOPEDIC SURGERY | Facility: CLINIC | Age: 79
End: 2024-10-10

## 2024-10-14 ENCOUNTER — APPOINTMENT (OUTPATIENT)
Dept: PAIN MANAGEMENT | Facility: CLINIC | Age: 79
End: 2024-10-14
Payer: MEDICARE

## 2024-10-14 DIAGNOSIS — G03.9 MENINGITIS, UNSPECIFIED: ICD-10-CM

## 2024-10-14 PROCEDURE — 99213 OFFICE O/P EST LOW 20 MIN: CPT

## 2024-10-15 ENCOUNTER — APPOINTMENT (OUTPATIENT)
Dept: ORTHOPEDIC SURGERY | Facility: CLINIC | Age: 79
End: 2024-10-15

## 2024-10-28 ENCOUNTER — APPOINTMENT (OUTPATIENT)
Dept: ORTHOPEDIC SURGERY | Facility: CLINIC | Age: 79
End: 2024-10-28

## 2024-11-13 ENCOUNTER — APPOINTMENT (OUTPATIENT)
Dept: PAIN MANAGEMENT | Facility: CLINIC | Age: 79
End: 2024-11-13
Payer: MEDICARE

## 2024-11-13 DIAGNOSIS — G03.9 MENINGITIS, UNSPECIFIED: ICD-10-CM

## 2024-11-13 PROCEDURE — 99213 OFFICE O/P EST LOW 20 MIN: CPT

## 2024-11-18 ENCOUNTER — APPOINTMENT (OUTPATIENT)
Dept: ORTHOPEDIC SURGERY | Facility: CLINIC | Age: 79
End: 2024-11-18
Payer: MEDICARE

## 2024-11-18 VITALS — HEIGHT: 62 IN | BODY MASS INDEX: 21.71 KG/M2 | WEIGHT: 118 LBS

## 2024-11-18 DIAGNOSIS — M17.0 BILATERAL PRIMARY OSTEOARTHRITIS OF KNEE: ICD-10-CM

## 2024-11-18 PROCEDURE — 20611 DRAIN/INJ JOINT/BURSA W/US: CPT | Mod: LT

## 2024-12-18 ENCOUNTER — APPOINTMENT (OUTPATIENT)
Dept: PAIN MANAGEMENT | Facility: CLINIC | Age: 79
End: 2024-12-18
Payer: MEDICARE

## 2024-12-18 DIAGNOSIS — G03.9 MENINGITIS, UNSPECIFIED: ICD-10-CM

## 2024-12-18 PROCEDURE — 99213 OFFICE O/P EST LOW 20 MIN: CPT

## 2024-12-22 NOTE — PROGRESS NOTE ADULT - PROBLEM SELECTOR PROBLEM 7
12/21/24 2038 12/21/24 2039   Vital Signs   Heart Rate (!) 105 (!) 110   Resp 18 18   /73 113/66   MAP (mmHg)  --  84   Calculated MAP (mmHg) 88 mm Hg  --    Patient Position Sitting/High-Huizar's Standing       
S/P spinal surgery

## 2025-01-13 ENCOUNTER — APPOINTMENT (OUTPATIENT)
Dept: ORTHOPEDIC SURGERY | Facility: CLINIC | Age: 80
End: 2025-01-13

## 2025-01-15 ENCOUNTER — APPOINTMENT (OUTPATIENT)
Dept: PAIN MANAGEMENT | Facility: CLINIC | Age: 80
End: 2025-01-15
Payer: MEDICARE

## 2025-01-15 DIAGNOSIS — G03.9 MENINGITIS, UNSPECIFIED: ICD-10-CM

## 2025-01-15 PROCEDURE — 99213 OFFICE O/P EST LOW 20 MIN: CPT

## 2025-01-27 ENCOUNTER — APPOINTMENT (OUTPATIENT)
Dept: ORTHOPEDIC SURGERY | Facility: CLINIC | Age: 80
End: 2025-01-27
Payer: MEDICARE

## 2025-01-27 DIAGNOSIS — M47.816 SPONDYLOSIS W/OUT MYELOPATHY OR RADICULOPATHY, LUMBAR REGION: ICD-10-CM

## 2025-01-27 DIAGNOSIS — M17.0 BILATERAL PRIMARY OSTEOARTHRITIS OF KNEE: ICD-10-CM

## 2025-01-27 PROCEDURE — 20611 DRAIN/INJ JOINT/BURSA W/US: CPT | Mod: RT

## 2025-01-27 PROCEDURE — 99214 OFFICE O/P EST MOD 30 MIN: CPT | Mod: 25

## 2025-01-27 RX ORDER — CELECOXIB 200 MG/1
200 CAPSULE ORAL
Qty: 30 | Refills: 3 | Status: ACTIVE | COMMUNITY
Start: 2025-01-27 | End: 1900-01-01

## 2025-02-03 ENCOUNTER — APPOINTMENT (OUTPATIENT)
Dept: PULMONOLOGY | Facility: CLINIC | Age: 80
End: 2025-02-03
Payer: MEDICARE

## 2025-02-03 VITALS
BODY MASS INDEX: 21.9 KG/M2 | DIASTOLIC BLOOD PRESSURE: 56 MMHG | WEIGHT: 119 LBS | SYSTOLIC BLOOD PRESSURE: 148 MMHG | RESPIRATION RATE: 16 BRPM | TEMPERATURE: 96.7 F | HEIGHT: 62 IN | OXYGEN SATURATION: 98 % | HEART RATE: 84 BPM

## 2025-02-03 DIAGNOSIS — J30.9 ALLERGIC RHINITIS, UNSPECIFIED: ICD-10-CM

## 2025-02-03 DIAGNOSIS — J47.9 BRONCHIECTASIS, UNCOMPLICATED: ICD-10-CM

## 2025-02-03 DIAGNOSIS — J45.909 UNSPECIFIED ASTHMA, UNCOMPLICATED: ICD-10-CM

## 2025-02-03 DIAGNOSIS — R91.8 OTHER NONSPECIFIC ABNORMAL FINDING OF LUNG FIELD: ICD-10-CM

## 2025-02-03 DIAGNOSIS — R06.02 SHORTNESS OF BREATH: ICD-10-CM

## 2025-02-03 DIAGNOSIS — K21.9 GASTRO-ESOPHAGEAL REFLUX DISEASE W/OUT ESOPHAGITIS: ICD-10-CM

## 2025-02-03 PROCEDURE — ZZZZZ: CPT

## 2025-02-03 PROCEDURE — 94010 BREATHING CAPACITY TEST: CPT

## 2025-02-03 PROCEDURE — 94727 GAS DIL/WSHOT DETER LNG VOL: CPT

## 2025-02-03 PROCEDURE — 99214 OFFICE O/P EST MOD 30 MIN: CPT | Mod: 25

## 2025-02-03 PROCEDURE — 95012 NITRIC OXIDE EXP GAS DETER: CPT

## 2025-02-03 PROCEDURE — 94729 DIFFUSING CAPACITY: CPT

## 2025-02-13 ENCOUNTER — APPOINTMENT (OUTPATIENT)
Dept: PAIN MANAGEMENT | Facility: CLINIC | Age: 80
End: 2025-02-13
Payer: MEDICARE

## 2025-02-13 DIAGNOSIS — G03.9 MENINGITIS, UNSPECIFIED: ICD-10-CM

## 2025-02-13 PROCEDURE — 99213 OFFICE O/P EST LOW 20 MIN: CPT | Mod: 93

## 2025-03-01 ENCOUNTER — APPOINTMENT (OUTPATIENT)
Dept: CT IMAGING | Facility: CLINIC | Age: 80
End: 2025-03-01

## 2025-03-17 ENCOUNTER — APPOINTMENT (OUTPATIENT)
Dept: PAIN MANAGEMENT | Facility: CLINIC | Age: 80
End: 2025-03-17
Payer: MEDICARE

## 2025-03-17 DIAGNOSIS — G03.9 MENINGITIS, UNSPECIFIED: ICD-10-CM

## 2025-03-17 PROCEDURE — 99213 OFFICE O/P EST LOW 20 MIN: CPT | Mod: 2W

## 2025-03-22 ENCOUNTER — OUTPATIENT (OUTPATIENT)
Dept: OUTPATIENT SERVICES | Facility: HOSPITAL | Age: 80
LOS: 1 days | End: 2025-03-22
Payer: MEDICARE

## 2025-03-22 ENCOUNTER — APPOINTMENT (OUTPATIENT)
Dept: CT IMAGING | Facility: CLINIC | Age: 80
End: 2025-03-22

## 2025-03-22 DIAGNOSIS — Z98.1 ARTHRODESIS STATUS: Chronic | ICD-10-CM

## 2025-03-22 DIAGNOSIS — Z98.89 OTHER SPECIFIED POSTPROCEDURAL STATES: Chronic | ICD-10-CM

## 2025-03-22 DIAGNOSIS — R91.8 OTHER NONSPECIFIC ABNORMAL FINDING OF LUNG FIELD: ICD-10-CM

## 2025-03-22 DIAGNOSIS — Z98.890 OTHER SPECIFIED POSTPROCEDURAL STATES: Chronic | ICD-10-CM

## 2025-03-22 DIAGNOSIS — Z98.49 CATARACT EXTRACTION STATUS, UNSPECIFIED EYE: Chronic | ICD-10-CM

## 2025-03-22 DIAGNOSIS — Z96.89 PRESENCE OF OTHER SPECIFIED FUNCTIONAL IMPLANTS: Chronic | ICD-10-CM

## 2025-03-22 DIAGNOSIS — M54.9 DORSALGIA, UNSPECIFIED: Chronic | ICD-10-CM

## 2025-03-22 PROCEDURE — 71250 CT THORAX DX C-: CPT | Mod: 26

## 2025-03-22 PROCEDURE — 71250 CT THORAX DX C-: CPT

## 2025-03-31 ENCOUNTER — APPOINTMENT (OUTPATIENT)
Dept: ORTHOPEDIC SURGERY | Facility: CLINIC | Age: 80
End: 2025-03-31

## 2025-04-07 ENCOUNTER — APPOINTMENT (OUTPATIENT)
Dept: ORTHOPEDIC SURGERY | Facility: CLINIC | Age: 80
End: 2025-04-07
Payer: MEDICARE

## 2025-04-07 VITALS — WEIGHT: 118 LBS | HEIGHT: 62 IN | BODY MASS INDEX: 21.71 KG/M2

## 2025-04-07 PROCEDURE — 99213 OFFICE O/P EST LOW 20 MIN: CPT

## 2025-04-07 PROCEDURE — 73564 X-RAY EXAM KNEE 4 OR MORE: CPT | Mod: 50

## 2025-04-15 ENCOUNTER — NON-APPOINTMENT (OUTPATIENT)
Age: 80
End: 2025-04-15

## 2025-04-16 ENCOUNTER — APPOINTMENT (OUTPATIENT)
Dept: PAIN MANAGEMENT | Facility: CLINIC | Age: 80
End: 2025-04-16
Payer: MEDICARE

## 2025-04-16 DIAGNOSIS — G03.9 MENINGITIS, UNSPECIFIED: ICD-10-CM

## 2025-04-16 PROCEDURE — 99213 OFFICE O/P EST LOW 20 MIN: CPT | Mod: 2W

## 2025-05-14 ENCOUNTER — APPOINTMENT (OUTPATIENT)
Dept: PAIN MANAGEMENT | Facility: CLINIC | Age: 80
End: 2025-05-14
Payer: MEDICARE

## 2025-05-14 DIAGNOSIS — G03.9 MENINGITIS, UNSPECIFIED: ICD-10-CM

## 2025-05-14 PROCEDURE — 99213 OFFICE O/P EST LOW 20 MIN: CPT | Mod: 93

## 2025-05-29 ENCOUNTER — APPOINTMENT (OUTPATIENT)
Dept: ORTHOPEDIC SURGERY | Facility: CLINIC | Age: 80
End: 2025-05-29

## 2025-06-10 ENCOUNTER — APPOINTMENT (OUTPATIENT)
Dept: ORTHOPEDIC SURGERY | Facility: CLINIC | Age: 80
End: 2025-06-10

## 2025-06-11 ENCOUNTER — APPOINTMENT (OUTPATIENT)
Dept: PAIN MANAGEMENT | Facility: CLINIC | Age: 80
End: 2025-06-11
Payer: MEDICARE

## 2025-06-11 PROCEDURE — 99213 OFFICE O/P EST LOW 20 MIN: CPT

## 2025-06-11 RX ORDER — LIDOCAINE AND PRILOCAINE 25; 25 MG/G; MG/G
2.5-2.5 CREAM TOPICAL
Qty: 3 | Refills: 5 | Status: ACTIVE | COMMUNITY
Start: 2025-06-11 | End: 1900-01-01

## 2025-06-25 NOTE — ED ADULT NURSE NOTE - HOW OFTEN DO YOU HAVE A DRINK CONTAINING ALCOHOL?
Patient came in to the office today for ppd placement. Patient denies any allergies to any foods or medications. Patient gave consent to ppd placement today. Patient was given injection in left forearm 0.1ml. Patient tolerated well without any adverse reactions and is scheduled for reading on 6/27/25.     : [employer]  Lot Number: [id number]  Expiration Date: 01/01/2028  NDC Number:[id number]     Never

## 2025-07-16 ENCOUNTER — APPOINTMENT (OUTPATIENT)
Dept: PAIN MANAGEMENT | Facility: CLINIC | Age: 80
End: 2025-07-16
Payer: MEDICARE

## 2025-07-16 PROCEDURE — 99213 OFFICE O/P EST LOW 20 MIN: CPT | Mod: 93

## 2025-08-04 ENCOUNTER — APPOINTMENT (OUTPATIENT)
Dept: PAIN MANAGEMENT | Facility: CLINIC | Age: 80
End: 2025-08-04
Payer: MEDICARE

## 2025-08-04 PROCEDURE — 99213 OFFICE O/P EST LOW 20 MIN: CPT | Mod: 2W

## 2025-08-04 RX ORDER — METHYLPREDNISOLONE 4 MG/1
4 TABLET ORAL
Qty: 1 | Refills: 0 | Status: ACTIVE | COMMUNITY
Start: 2025-08-04 | End: 1900-01-01

## 2025-08-11 ENCOUNTER — APPOINTMENT (OUTPATIENT)
Dept: PAIN MANAGEMENT | Facility: CLINIC | Age: 80
End: 2025-08-11
Payer: MEDICARE

## 2025-08-11 VITALS — WEIGHT: 118 LBS | HEIGHT: 62 IN | BODY MASS INDEX: 21.71 KG/M2

## 2025-08-11 DIAGNOSIS — G03.9 MENINGITIS, UNSPECIFIED: ICD-10-CM

## 2025-08-11 PROCEDURE — 99214 OFFICE O/P EST MOD 30 MIN: CPT | Mod: 93

## 2025-08-19 ENCOUNTER — APPOINTMENT (OUTPATIENT)
Dept: PULMONOLOGY | Facility: CLINIC | Age: 80
End: 2025-08-19
Payer: MEDICARE

## 2025-08-19 ENCOUNTER — APPOINTMENT (OUTPATIENT)
Dept: PULMONOLOGY | Facility: CLINIC | Age: 80
End: 2025-08-19

## 2025-08-19 VITALS
TEMPERATURE: 97.2 F | HEART RATE: 80 BPM | SYSTOLIC BLOOD PRESSURE: 140 MMHG | RESPIRATION RATE: 16 BRPM | WEIGHT: 117 LBS | OXYGEN SATURATION: 99 % | DIASTOLIC BLOOD PRESSURE: 70 MMHG | BODY MASS INDEX: 21.53 KG/M2 | HEIGHT: 62 IN

## 2025-08-19 DIAGNOSIS — R91.8 OTHER NONSPECIFIC ABNORMAL FINDING OF LUNG FIELD: ICD-10-CM

## 2025-08-19 DIAGNOSIS — R06.02 SHORTNESS OF BREATH: ICD-10-CM

## 2025-08-19 DIAGNOSIS — K21.9 GASTRO-ESOPHAGEAL REFLUX DISEASE W/OUT ESOPHAGITIS: ICD-10-CM

## 2025-08-19 DIAGNOSIS — J30.9 ALLERGIC RHINITIS, UNSPECIFIED: ICD-10-CM

## 2025-08-19 DIAGNOSIS — J45.909 UNSPECIFIED ASTHMA, UNCOMPLICATED: ICD-10-CM

## 2025-08-19 DIAGNOSIS — J47.9 BRONCHIECTASIS, UNCOMPLICATED: ICD-10-CM

## 2025-08-19 PROCEDURE — 94010 BREATHING CAPACITY TEST: CPT

## 2025-08-19 PROCEDURE — 95012 NITRIC OXIDE EXP GAS DETER: CPT

## 2025-08-19 PROCEDURE — ZZZZZ: CPT

## 2025-08-19 PROCEDURE — 99214 OFFICE O/P EST MOD 30 MIN: CPT | Mod: 25

## 2025-08-21 ENCOUNTER — RESULT REVIEW (OUTPATIENT)
Age: 80
End: 2025-08-21

## 2025-08-21 ENCOUNTER — APPOINTMENT (OUTPATIENT)
Dept: CT IMAGING | Facility: CLINIC | Age: 80
End: 2025-08-21

## 2025-08-21 ENCOUNTER — OUTPATIENT (OUTPATIENT)
Dept: OUTPATIENT SERVICES | Facility: HOSPITAL | Age: 80
LOS: 1 days | End: 2025-08-21
Payer: MEDICARE

## 2025-08-21 DIAGNOSIS — G03.9 MENINGITIS, UNSPECIFIED: ICD-10-CM

## 2025-08-21 DIAGNOSIS — Z98.89 OTHER SPECIFIED POSTPROCEDURAL STATES: Chronic | ICD-10-CM

## 2025-08-21 DIAGNOSIS — Z98.49 CATARACT EXTRACTION STATUS, UNSPECIFIED EYE: Chronic | ICD-10-CM

## 2025-08-21 DIAGNOSIS — Z98.1 ARTHRODESIS STATUS: Chronic | ICD-10-CM

## 2025-08-21 DIAGNOSIS — M54.9 DORSALGIA, UNSPECIFIED: Chronic | ICD-10-CM

## 2025-08-21 DIAGNOSIS — Z98.890 OTHER SPECIFIED POSTPROCEDURAL STATES: Chronic | ICD-10-CM

## 2025-08-21 DIAGNOSIS — Z96.89 PRESENCE OF OTHER SPECIFIED FUNCTIONAL IMPLANTS: Chronic | ICD-10-CM

## 2025-08-21 PROCEDURE — 76376 3D RENDER W/INTRP POSTPROCES: CPT

## 2025-08-21 PROCEDURE — 72131 CT LUMBAR SPINE W/O DYE: CPT | Mod: 26

## 2025-08-21 PROCEDURE — 72131 CT LUMBAR SPINE W/O DYE: CPT | Mod: MH

## 2025-08-21 PROCEDURE — 76376 3D RENDER W/INTRP POSTPROCES: CPT | Mod: 26

## 2025-08-21 RX ORDER — FLUTICASONE FUROATE AND VILANTEROL TRIFENATATE 200; 25 UG/1; UG/1
200-25 POWDER RESPIRATORY (INHALATION)
Qty: 3 | Refills: 1 | Status: ACTIVE | COMMUNITY
Start: 2025-08-19 | End: 1900-01-01

## 2025-09-08 ENCOUNTER — APPOINTMENT (OUTPATIENT)
Dept: ORTHOPEDIC SURGERY | Facility: CLINIC | Age: 80
End: 2025-09-08
Payer: MEDICARE

## 2025-09-08 DIAGNOSIS — M17.0 BILATERAL PRIMARY OSTEOARTHRITIS OF KNEE: ICD-10-CM

## 2025-09-08 PROCEDURE — 99213 OFFICE O/P EST LOW 20 MIN: CPT | Mod: 25

## 2025-09-08 PROCEDURE — 20611 DRAIN/INJ JOINT/BURSA W/US: CPT | Mod: RT

## 2025-09-09 ENCOUNTER — APPOINTMENT (OUTPATIENT)
Dept: PAIN MANAGEMENT | Facility: CLINIC | Age: 80
End: 2025-09-09
Payer: MEDICARE

## 2025-09-09 VITALS — HEIGHT: 62 IN | WEIGHT: 117 LBS | BODY MASS INDEX: 21.53 KG/M2

## 2025-09-09 DIAGNOSIS — M47.816 SPONDYLOSIS W/OUT MYELOPATHY OR RADICULOPATHY, LUMBAR REGION: ICD-10-CM

## 2025-09-09 PROCEDURE — 99212 OFFICE O/P EST SF 10 MIN: CPT

## 2025-09-15 ENCOUNTER — APPOINTMENT (OUTPATIENT)
Dept: ORTHOPEDIC SURGERY | Facility: CLINIC | Age: 80
End: 2025-09-15